# Patient Record
Sex: MALE | Employment: UNEMPLOYED | URBAN - METROPOLITAN AREA
[De-identification: names, ages, dates, MRNs, and addresses within clinical notes are randomized per-mention and may not be internally consistent; named-entity substitution may affect disease eponyms.]

---

## 2018-10-08 ENCOUNTER — HOSPITAL ENCOUNTER (INPATIENT)
Age: 48
LOS: 1 days | Discharge: HOME | DRG: 439 | End: 2018-10-10
Attending: EMERGENCY MEDICINE | Admitting: INTERNAL MEDICINE
Payer: OTHER GOVERNMENT

## 2018-10-08 ENCOUNTER — APPOINTMENT (OUTPATIENT)
Dept: CT IMAGING | Age: 48
DRG: 439 | End: 2018-10-08
Attending: EMERGENCY MEDICINE
Payer: OTHER GOVERNMENT

## 2018-10-08 DIAGNOSIS — K85.90 ACUTE PANCREATITIS, UNSPECIFIED COMPLICATION STATUS, UNSPECIFIED PANCREATITIS TYPE: Primary | ICD-10-CM

## 2018-10-08 DIAGNOSIS — R73.9 HYPERGLYCEMIA: ICD-10-CM

## 2018-10-08 DIAGNOSIS — N17.9 AKI (ACUTE KIDNEY INJURY) (HCC): ICD-10-CM

## 2018-10-08 LAB
ALBUMIN SERPL-MCNC: 2.6 G/DL (ref 3.4–5)
ALBUMIN/GLOB SERPL: 0.5 {RATIO} (ref 0.8–1.7)
ALP SERPL-CCNC: 134 U/L (ref 45–117)
ALT SERPL-CCNC: 24 U/L (ref 16–61)
ANION GAP SERPL CALC-SCNC: 9 MMOL/L (ref 3–18)
AST SERPL-CCNC: 23 U/L (ref 15–37)
BASOPHILS # BLD: 0.1 K/UL (ref 0–0.1)
BASOPHILS NFR BLD: 1 % (ref 0–2)
BILIRUB DIRECT SERPL-MCNC: 0.1 MG/DL (ref 0–0.2)
BILIRUB SERPL-MCNC: 0.3 MG/DL (ref 0.2–1)
BUN SERPL-MCNC: 34 MG/DL (ref 7–18)
BUN/CREAT SERPL: 17 (ref 12–20)
CALCIUM SERPL-MCNC: 8.5 MG/DL (ref 8.5–10.1)
CHLORIDE SERPL-SCNC: 97 MMOL/L (ref 100–108)
CO2 SERPL-SCNC: 26 MMOL/L (ref 21–32)
CREAT SERPL-MCNC: 1.97 MG/DL (ref 0.6–1.3)
DIFFERENTIAL METHOD BLD: NORMAL
EOSINOPHIL # BLD: 0.3 K/UL (ref 0–0.4)
EOSINOPHIL NFR BLD: 3 % (ref 0–5)
ERYTHROCYTE [DISTWIDTH] IN BLOOD BY AUTOMATED COUNT: 13.4 % (ref 11.6–14.5)
GLOBULIN SER CALC-MCNC: 4.9 G/DL (ref 2–4)
GLUCOSE SERPL-MCNC: 401 MG/DL (ref 74–99)
HCT VFR BLD AUTO: 45.7 % (ref 36–48)
HGB BLD-MCNC: 15.5 G/DL (ref 13–16)
LACTATE BLD-SCNC: 3 MMOL/L (ref 0.4–2)
LIPASE SERPL-CCNC: 735 U/L (ref 73–393)
LYMPHOCYTES # BLD: 3.3 K/UL (ref 0.9–3.6)
LYMPHOCYTES NFR BLD: 36 % (ref 21–52)
MCH RBC QN AUTO: 29.4 PG (ref 24–34)
MCHC RBC AUTO-ENTMCNC: 33.9 G/DL (ref 31–37)
MCV RBC AUTO: 86.6 FL (ref 74–97)
MONOCYTES # BLD: 0.9 K/UL (ref 0.05–1.2)
MONOCYTES NFR BLD: 9 % (ref 3–10)
NEUTS SEG # BLD: 4.8 K/UL (ref 1.8–8)
NEUTS SEG NFR BLD: 51 % (ref 40–73)
PLATELET # BLD AUTO: 232 K/UL (ref 135–420)
PMV BLD AUTO: 10.7 FL (ref 9.2–11.8)
POTASSIUM SERPL-SCNC: 3.8 MMOL/L (ref 3.5–5.5)
PROT SERPL-MCNC: 7.5 G/DL (ref 6.4–8.2)
RBC # BLD AUTO: 5.28 M/UL (ref 4.7–5.5)
SODIUM SERPL-SCNC: 132 MMOL/L (ref 136–145)
WBC # BLD AUTO: 9.3 K/UL (ref 4.6–13.2)

## 2018-10-08 PROCEDURE — 83605 ASSAY OF LACTIC ACID: CPT

## 2018-10-08 PROCEDURE — 96374 THER/PROPH/DIAG INJ IV PUSH: CPT

## 2018-10-08 PROCEDURE — 74011250636 HC RX REV CODE- 250/636

## 2018-10-08 PROCEDURE — 80076 HEPATIC FUNCTION PANEL: CPT | Performed by: EMERGENCY MEDICINE

## 2018-10-08 PROCEDURE — 96365 THER/PROPH/DIAG IV INF INIT: CPT

## 2018-10-08 PROCEDURE — 96376 TX/PRO/DX INJ SAME DRUG ADON: CPT

## 2018-10-08 PROCEDURE — 74011250637 HC RX REV CODE- 250/637: Performed by: EMERGENCY MEDICINE

## 2018-10-08 PROCEDURE — 74176 CT ABD & PELVIS W/O CONTRAST: CPT

## 2018-10-08 PROCEDURE — 87040 BLOOD CULTURE FOR BACTERIA: CPT | Performed by: EMERGENCY MEDICINE

## 2018-10-08 PROCEDURE — 85025 COMPLETE CBC W/AUTO DIFF WBC: CPT | Performed by: EMERGENCY MEDICINE

## 2018-10-08 PROCEDURE — 94761 N-INVAS EAR/PLS OXIMETRY MLT: CPT

## 2018-10-08 PROCEDURE — 96361 HYDRATE IV INFUSION ADD-ON: CPT

## 2018-10-08 PROCEDURE — 74011250636 HC RX REV CODE- 250/636: Performed by: EMERGENCY MEDICINE

## 2018-10-08 PROCEDURE — 81001 URINALYSIS AUTO W/SCOPE: CPT | Performed by: EMERGENCY MEDICINE

## 2018-10-08 PROCEDURE — 83690 ASSAY OF LIPASE: CPT | Performed by: EMERGENCY MEDICINE

## 2018-10-08 PROCEDURE — 74011636637 HC RX REV CODE- 636/637: Performed by: EMERGENCY MEDICINE

## 2018-10-08 PROCEDURE — 99285 EMERGENCY DEPT VISIT HI MDM: CPT

## 2018-10-08 PROCEDURE — 96375 TX/PRO/DX INJ NEW DRUG ADDON: CPT

## 2018-10-08 PROCEDURE — 80048 BASIC METABOLIC PNL TOTAL CA: CPT | Performed by: EMERGENCY MEDICINE

## 2018-10-08 RX ORDER — FENTANYL CITRATE 50 UG/ML
50 INJECTION, SOLUTION INTRAMUSCULAR; INTRAVENOUS
Status: COMPLETED | OUTPATIENT
Start: 2018-10-08 | End: 2018-10-08

## 2018-10-08 RX ORDER — POTASSIUM CHLORIDE 7.45 MG/ML
10 INJECTION INTRAVENOUS
Status: COMPLETED | OUTPATIENT
Start: 2018-10-08 | End: 2018-10-09

## 2018-10-08 RX ORDER — KETOROLAC TROMETHAMINE 30 MG/ML
30 INJECTION, SOLUTION INTRAMUSCULAR; INTRAVENOUS
Status: COMPLETED | OUTPATIENT
Start: 2018-10-08 | End: 2018-10-08

## 2018-10-08 RX ORDER — ONDANSETRON 2 MG/ML
4 INJECTION INTRAMUSCULAR; INTRAVENOUS
Status: COMPLETED | OUTPATIENT
Start: 2018-10-08 | End: 2018-10-08

## 2018-10-08 RX ORDER — POTASSIUM CHLORIDE 1.5 G/1.77G
40 POWDER, FOR SOLUTION ORAL
Status: DISCONTINUED | OUTPATIENT
Start: 2018-10-08 | End: 2018-10-08

## 2018-10-08 RX ORDER — POTASSIUM CHLORIDE 20 MEQ/1
40 TABLET, EXTENDED RELEASE ORAL
Status: COMPLETED | OUTPATIENT
Start: 2018-10-08 | End: 2018-10-08

## 2018-10-08 RX ORDER — ONDANSETRON 2 MG/ML
INJECTION INTRAMUSCULAR; INTRAVENOUS
Status: COMPLETED
Start: 2018-10-08 | End: 2018-10-08

## 2018-10-08 RX ADMIN — SODIUM CHLORIDE 1000 ML: 900 INJECTION, SOLUTION INTRAVENOUS at 21:02

## 2018-10-08 RX ADMIN — INSULIN HUMAN 10 UNITS: 100 INJECTION, SOLUTION PARENTERAL at 23:36

## 2018-10-08 RX ADMIN — FENTANYL CITRATE 50 MCG: 50 INJECTION INTRAMUSCULAR; INTRAVENOUS at 21:02

## 2018-10-08 RX ADMIN — KETOROLAC TROMETHAMINE 30 MG: 30 INJECTION, SOLUTION INTRAMUSCULAR at 21:02

## 2018-10-08 RX ADMIN — ONDANSETRON 4 MG: 2 INJECTION INTRAMUSCULAR; INTRAVENOUS at 23:34

## 2018-10-08 RX ADMIN — ONDANSETRON HYDROCHLORIDE 4 MG: 2 INJECTION, SOLUTION INTRAMUSCULAR; INTRAVENOUS at 23:34

## 2018-10-08 RX ADMIN — ONDANSETRON HYDROCHLORIDE 4 MG: 2 INJECTION, SOLUTION INTRAMUSCULAR; INTRAVENOUS at 21:00

## 2018-10-08 RX ADMIN — POTASSIUM CHLORIDE 40 MEQ: 20 TABLET, EXTENDED RELEASE ORAL at 23:57

## 2018-10-08 RX ADMIN — FENTANYL CITRATE 50 MCG: 50 INJECTION INTRAMUSCULAR; INTRAVENOUS at 23:54

## 2018-10-08 RX ADMIN — POTASSIUM CHLORIDE 10 MEQ: 10 INJECTION, SOLUTION INTRAVENOUS at 23:39

## 2018-10-08 RX ADMIN — SODIUM CHLORIDE 1000 ML: 900 INJECTION, SOLUTION INTRAVENOUS at 23:32

## 2018-10-08 RX ADMIN — SODIUM CHLORIDE 1000 ML: 900 INJECTION, SOLUTION INTRAVENOUS at 22:06

## 2018-10-08 NOTE — IP AVS SNAPSHOT
[address] Patient: Reena Bundy MRN: [id number] PUO:3/1[birth year]494 About your hospitalization You were admitted on:  October 9, 2018 You last received care in the:  [address] [place] [address] You were discharged on:  October 10, 2018 Why you were hospitalized Your primary diagnosis was:  Acute Pancreatitis Your diagnoses also included:  Type 2 Diabetes Mellitus With Hyperglycemia (Hcc), Altaf (Acute Kidney Injury) (Hcc), Elevated Serum Alkaline Phosphatase Level, Hyponatremia Follow-up Information Follow up With Details Comments Contact Info UNKNOWN   told pt to follow up with [facility] specialty group. No follow up appointment needed Discharge Orders None A check denilson indicates which time of day the medication should be taken. My Medications START taking these medications Instructions Each Dose to Equal  
 Morning Noon Evening Bedtime  
 traMADol 50 mg tablet Commonly known as:  ULTRAM  
   
Your last dose was: Your next dose is: Take 1 Tab by mouth every six (6) hours as needed for Pain for up to 7 days. Max Daily Amount: 200 mg.  
 50 mg CONTINUE taking these medications Instructions Each Dose to Equal  
 Morning Noon Evening Bedtime BYDUREON BCISE 2 mg/0.85 mL Atin Generic drug:  exenatide microspheres Your last dose was: Your next dose is:    
   
   
 by SubCUTAneous route every seven (7) days. LANTUS U-100 INSULIN 100 unit/mL injection Generic drug:  insulin glargine Your last dose was: Your next dose is:    
   
   
 80 Units by SubCUTAneous route nightly. 80 Units NovoLOG Flexpen U-100 Insulin 100 unit/mL Inpn Generic drug:  insulin aspart U-100 Your last dose was: Your next dose is: by SubCUTAneous route Before breakfast, lunch, dinner and at bedtime. STOP taking these medications   
 ergocalciferol 50,000 unit capsule Commonly known as:  ERGOCALCIFEROL  
   
  
 LIPITOR 20 mg tablet Generic drug:  atorvastatin  
   
  
 lisinopril 10 mg tablet Commonly known as:  Gabby Chávez Where to Get Your Medications Information on where to get these meds will be given to you by the nurse or doctor. ! Ask your nurse or doctor about these medications  
  traMADol 50 mg tablet Opioid Education Prescription Opioids: What You Need to Know: 
 
Prescription opioids can be used to help relieve moderate-to-severe pain and are often prescribed following a surgery or injury, or for certain health conditions. These medications can be an important part of treatment but also come with serious risks. Opioids are strong pain medicines. Examples include hydrocodone, oxycodone, fentanyl, and morphine. Heroin is an example of an illegal opioid. It is important to work with your health care provider to make sure you are getting the safest, most effective care. WHAT ARE THE RISKS AND SIDE EFFECTS OF OPIOID USE? Prescription opioids carry serious risks of addiction and overdose, especially with prolonged use. An opioid overdose, often marked by slow breathing, can cause sudden death. The use of prescription opioids can have a number of side effects as well, even when taken as directed. · Tolerance-meaning you might need to take more of a medication for the same pain relief · Physical dependence-meaning you have symptoms of withdrawal when the medication is stopped. Withdrawal symptoms can include nausea, sweating, chills, diarrhea, stomach cramps, and muscle aches. Withdrawal can last up to several weeks, depending on which drug you took and how long you took it. · Increased sensitivity to pain · Constipation · Nausea, vomiting, and dry mouth · Sleepiness and dizziness · Confusion · Depression · Low levels of testosterone that can result in lower sex drive, energy, and strength · Itching and sweating RISKS ARE GREATER WITH:      
· History of drug misuse, substance use disorder, or overdose · Mental health conditions (such as depression or anxiety) · Sleep apnea · Older age (72 years or older) · Pregnancy Avoid alcohol while taking prescription opioids. Also, unless specifically advised by your health care provider, medications to avoid include: · Benzodiazepines (such as Xanax or Valium) · Muscle relaxants (such as Soma or Flexeril) · Hypnotics (such as Ambien or Lunesta) · Other prescription opioids KNOW YOUR OPTIONS Talk to your health care provider about ways to manage your pain that don't involve prescription opioids. Some of these options may actually work better and have fewer risks and side effects. Consult your physician before adding or stopping any medications, treatments, or physical activity. Options may include: 
· Pain relievers such as acetaminophen, ibuprofen, and naproxen · Some medications that are also used for depression or seizures · Physical therapy and exercise · Counseling to help patients learn how to cope better with triggers of pain and stress. · Application of heat or cold compress · Massage therapy · Relaxation techniques Be Informed Make sure you know the name of your medication, how much and how often to take it, and its potential risks & side effects. IF YOU ARE PRESCRIBED OPIOIDS FOR PAIN: 
· Never take opioids in greater amounts or more often than prescribed. Remember the goal is not to be pain-free but to manage your pain at a tolerable level. · Follow up with your primary care provider to: · Work together to create a plan on how to manage your pain. · Talk about ways to help manage your pain that don't involve prescription opioids. · Talk about any and all concerns and side effects. · Help prevent misuse and abuse. · Never sell or share prescription opioids · Help prevent misuse and abuse. · Store prescription opioids in a secure place and out of reach of others (this may include visitors, children, friends, and family). · Safely dispose of unused/unwanted prescription opioids: Find your community drug take-back program or your pharmacy mail-back program, or flush them down the toilet, following guidance from the Food and Drug Administration (www.fda.gov/Drugs/ResourcesForYou). · Visit www.cdc.gov/drugoverdose to learn about the risks of opioid abuse and overdose. · If you believe you may be struggling with addiction, tell your health care provider and ask for guidance or call [employer] at 6-778-175-CQKF. Discharge Instructions Pancreatitis: Care Instructions Your Care Instructions The pancreas is an organ behind the stomach. It makes hormones and enzymes to help your body digest food. But if these enzymes attack the pancreas, it can get inflamed. This is called pancreatitis. Most cases are caused by gallstones or by heavy alcohol use. If you take care of yourself at home, it will help you get better. It will also help you avoid more problems with your pancreas. Follow-up care is a key part of your treatment and safety. Be sure to make and go to all appointments, and call your doctor if you are having problems. It's also a good idea to know your test results and keep a list of the medicines you take. How can you care for yourself at home? · Drink clear liquids and eat bland foods until you feel better. [place] foods include rice, dry toast, and crackers. They also include bananas and applesauce. · Eat a low-fat diet until your doctor says your pancreas is healed. · Do not drink alcohol. Tell your doctor if you need help to quit. Counseling, support groups, and sometimes medicines can help you stay sober. · Be safe with medicines. Read and follow all instructions on the label. ¨ If the doctor gave you a prescription medicine for pain, take it as prescribed. ¨ If you are not taking a prescription pain medicine, ask your doctor if you can take an over-the-counter medicine. · If your doctor prescribed antibiotics, take them as directed. Do not stop taking them just because you feel better. You need to take the full course of antibiotics. · Get extra rest until you feel better. To prevent future problems with your pancreas · Do not drink alcohol. · Tell your doctors and pharmacist that you've had pancreatitis. They can help you avoid medicines that may cause this problem again. When should you call for help? Call 911 anytime you think you may need emergency care. For example, call if: 
  · You vomit blood or what looks like coffee grounds.  
  · Your stools are maroon or very bloody.  
 Call your doctor now or seek immediate medical care if: 
  · You have new or worse belly pain.  
  · Your stools are black and look like tar, or they have streaks of blood.  
  · You are vomiting.  
 Watch closely for changes in your health, and be sure to contact your doctor if: 
  · You do not get better as expected. Where can you learn more? Go to http://reese-tonie.info/. Enter W011 in the search box to learn more about \"Pancreatitis: Care Instructions. \" Current as of: March 28, 2018 Content Version: 11.8 © 0766-0112 Healthwise, Incorporated. Care instructions adapted under license by [employer] (which disclaims liability or warranty for this information).  If you have questions about a medical condition or this instruction, always ask your healthcare professional. [place] [address] any warranty or liability for your use of this information. Patient armband removed and shredded MyChart Activation Thank you for requesting access to Nvigen. Please follow the instructions below to securely access and download your online medical record. Nvigen allows you to send messages to your doctor, view your test results, renew your prescriptions, schedule appointments, and more. How Do I Sign Up? 1. In your internet browser, go to [email or web address] 
2. Click on the First Time User? Click Here link in the Sign In box. You will be redirect to the New Member Sign Up page. 3. Enter your Nvigen Access Code exactly as it appears below. You will not need to use this code after youve completed the sign-up process. If you do not sign up before the expiration date, you must request a new code. Nvigen Access Code: [email or web address] Expires: 2019  8:29 PM (This is the date your Nvigen access code will ) 4. Enter the last four digits of your Social Security Number (xxxx) and Date of Birth (mm/dd/yyyy) as indicated and click Submit. You will be taken to the next sign-up page. 5. Create a Nvigen ID. This will be your Nvigen login ID and cannot be changed, so think of one that is secure and easy to remember. 6. Create a Nvigen password. You can change your password at any time. 7. Enter your Password Reset Question and Answer. This can be used at a later time if you forget your password. 8. Enter your e-mail address. You will receive e-mail notification when new information is available in [address] 9. Click Sign Up. You can now view and download portions of your medical record. 10. Click the Download Summary menu link to download a portable copy of your medical information. Additional Information If you have questions, please visit the Frequently Asked Questions section of the Nvigen website at [email or web address] Remember, Nvigen is NOT to be used for urgent needs.  For medical emergencies, dial 911. DISCHARGE SUMMARY from Nurse PATIENT INSTRUCTIONS: 
 
 
F-face looks uneven A-arms unable to move or move unevenly S-speech slurred or non-existent T-time-call 911 as soon as signs and symptoms begin-DO NOT go Back to bed or wait to see if you get better-TIME IS BRAIN. Warning Signs of HEART ATTACK Call 911 if you have these symptoms: 
? Chest discomfort. Most heart attacks involve discomfort in the center of the chest that lasts more than a few minutes, or that goes away and comes back. It can feel like uncomfortable pressure, squeezing, fullness, or pain. ? Discomfort in other areas of the upper body. Symptoms can include pain or discomfort in one or both arms, the back, neck, jaw, or stomach. ? Shortness of breath with or without chest discomfort. ? Other signs may include breaking out in a cold sweat, nausea, or lightheadedness. Don't wait more than five minutes to call [address]! Fast action can save your life. Calling 911 is almost always the fastest way to get lifesaving treatment. Emergency Medical Services staff can begin treatment when they arrive  up to an hour sooner than if someone gets to the hospital by car. The discharge information has been reviewed with the patient. The patient verbalized understanding. Discharge medications reviewed with the patient and appropriate educational materials and side effects teaching were provided. ___________________________________________________________________________________________________________________________________ MyChart Announcement We are excited to announce that we are making your provider's discharge notes available to you in Keemotion. You will see these notes when they are completed and signed by the physician that discharged you from your recent hospital stay. If you have any questions or concerns about any information you see in Keemotion, please call the Health Information Department where you were seen or reach out to your Primary Care Provider for more information about your plan of care. Introducing [facility]! [facility] introduces Keemotion patient portal. Now you can access parts of your medical record, email your doctor's office, and request medication refills online. 1. In your internet browser, go to [email or web address] 2. Click on the First Time User? Click Here link in the Sign In box. You will see the New Member Sign Up page. 3. Enter your Keemotion Access Code exactly as it appears below. You will not need to use this code after youve completed the sign-up process. If you do not sign up before the expiration date, you must request a new code. · Keemotion Access Code: [email or web address] Expires: 1/6/2019  8:29 PM 
 
4. Enter the last four digits of your Social Security Number (xxxx) and Date of Birth (mm/dd/yyyy) as indicated and click Submit. You will be taken to the next sign-up page. 5. Create a Keemotion ID. This will be your Keemotion login ID and cannot be changed, so think of one that is secure and easy to remember. 6. Create a Keemotion password. You can change your password at any time. 7. Enter your Password Reset Question and Answer. This can be used at a later time if you forget your password. 8. Enter your e-mail address. You will receive e-mail notification when new information is available in [address] 9. Click Sign Up. You can now view and download portions of your medical record.  
10. Click the Download Summary menu link to download a portable copy of your medical information. If you have questions, please visit the Frequently Asked Questions section of the SOL REPUBLIChart website. Remember, [employer] is NOT to be used for urgent needs. For medical emergencies, dial 911. Now available from your iPhone and Android! Introducing Gen Cordova As a [facility] patient, I wanted to make you aware of our electronic visit tool called Gen Cordova. Fournier Wandera 24/7 allows you to connect within minutes with a medical provider 24 hours a day, seven days a week via a mobile device or tablet or logging into a secure website from your computer. You can access Gen Cordova from anywhere in the [place]. A virtual visit might be right for you when you have a simple condition and feel like you just dont want to get out of bed, or cant get away from work for an appointment, when your regular [facility] provider is not available (evenings, weekends or holidays), or when youre out of town and need minor care. Electronic visits cost only $49 and if the [facility] 24/7 provider determines a prescription is needed to treat your condition, one can be electronically transmitted to a nearby pharmacy*. Please take a moment to enroll today if you have not already done so. The enrollment process is free and takes just a few minutes. To enroll, please download the Fournier Cord 24/Taste Filter deven to your tablet or phone, or visit [email or web address] to enroll on your computer. And, as an [address] [place] [address] patient with a Agilyx account, the results of your visits will be scanned into your electronic medical record and your primary care provider will be able to view the scanned results. We urge you to continue to see your regular [facility] provider for your ongoing medical care.   And while your primary care provider may not be the one available when you seek a Gen Cordova virtual visit, the peace of mind you get from getting a real diagnosis real time can be priceless. For more information on Gen scoo mobilitykbfin, view our Frequently Asked Questions (FAQs) at [email or web address]. Sincerely, 
 
Timbo Thornton MD 
Chief Medical Officer Ramona Murphy *:  certain medications cannot be prescribed via Gen Josegodfrey Unresulted Labs-Please follow up with your PCP about these lab tests Order Current Status KENNY QL, W/REFLEX CASCADE Preliminary result CULTURE, BLOOD Preliminary result CULTURE, BLOOD Preliminary result Providers Seen During Your Hospitalization Provider Specialty Primary office phone Onur Lee MD Emergency Medicine [phone] Olga Plaza MD Hospitalist [phone] Cindy Orosco MD Internal Medicine [phone] Immunizations Administered for This Admission Name Date Influenza Vaccine (Quad) PF  Deferred () Your Primary Care Physician (PCP) Primary Care Physician Office Phone Office Fax UNKNOWN ** None ** ** None ** You are allergic to the following Allergen Reactions Amoxicillin Nausea and Vomiting Gabapentin Nausea and Vomiting Other (comments) Urinary retention Metformin Nausea and Vomiting Recent Documentation Weight Smoking Status 125.9 kg Never Smoker Emergency Contacts Name Discharge Info Relation Home Work Mobile Kevin Spine DISCHARGE CAREGIVER [3] Spouse [3] [phone] Patient Belongings The following personal items are in your possession at time of discharge: 
  Dental Appliances: None  Visual Aid: None      Home Medications: None   Jewelry: None  Clothing: None    Other Valuables: None Please provide this summary of care documentation to your next provider.  
  
  
 
  
Signatures-by signing, you are acknowledging that this After Visit Summary has been reviewed with you and you have received a copy. Patient Signature:  ____________________________________________________________ Date:  ____________________________________________________________  
  
Carlean Mart Provider Signature:  ____________________________________________________________ Date:  ____________________________________________________________

## 2018-10-08 NOTE — IP AVS SNAPSHOT
[address] Patient: Be Desouza MRN: [id number] OJY:[birth year] A check denilson indicates which time of day the medication should be taken. My Medications START taking these medications Instructions Each Dose to Equal  
 Morning Noon Evening Bedtime  
 traMADol 50 mg tablet Commonly known as:  ULTRAM  
   
Your last dose was: Your next dose is: Take 1 Tab by mouth every six (6) hours as needed for Pain for up to 7 days. Max Daily Amount: 200 mg.  
 50 mg CONTINUE taking these medications Instructions Each Dose to Equal  
 Morning Noon Evening Bedtime BYDUREON BCISE 2 mg/0.85 mL Atin Generic drug:  exenatide microspheres Your last dose was: Your next dose is:    
   
   
 by SubCUTAneous route every seven (7) days. LANTUS U-100 INSULIN 100 unit/mL injection Generic drug:  insulin glargine Your last dose was: Your next dose is:    
   
   
 80 Units by SubCUTAneous route nightly. 80 Units NovoLOG Flexpen U-100 Insulin 100 unit/mL Inpn Generic drug:  insulin aspart U-100 Your last dose was: Your next dose is:    
   
   
 by SubCUTAneous route Before breakfast, lunch, dinner and at bedtime. STOP taking these medications   
 ergocalciferol 50,000 unit capsule Commonly known as:  ERGOCALCIFEROL  
   
  
 LIPITOR 20 mg tablet Generic drug:  atorvastatin  
   
  
 lisinopril 10 mg tablet Commonly known as:  Shania Burns Where to Get Your Medications Information on where to get these meds will be given to you by the nurse or doctor. ! Ask your nurse or doctor about these medications  
  traMADol 50 mg tablet

## 2018-10-08 NOTE — LETTER
[address] [place] [address] [facility] 
[address] [facility] [address] [phone] Work/School Note Date: 10/8/2018 To Whom It May concern: 
 
Darwin Ruano was seen and treated today in the emergency room by the following provider(s): 
Attending Provider: Nora Kaiser MD. Please excuse Bev Honeycutt from work 10/09/2018. Sincerely, Jessica Turcios RN BSN SINDI NRP

## 2018-10-09 ENCOUNTER — APPOINTMENT (OUTPATIENT)
Dept: MRI IMAGING | Age: 48
DRG: 439 | End: 2018-10-09
Attending: INTERNAL MEDICINE
Payer: OTHER GOVERNMENT

## 2018-10-09 ENCOUNTER — APPOINTMENT (OUTPATIENT)
Dept: ULTRASOUND IMAGING | Age: 48
DRG: 439 | End: 2018-10-09
Attending: NURSE PRACTITIONER
Payer: OTHER GOVERNMENT

## 2018-10-09 PROBLEM — K85.90 ACUTE PANCREATITIS: Status: ACTIVE | Noted: 2018-10-09

## 2018-10-09 PROBLEM — E87.1 HYPONATREMIA: Status: ACTIVE | Noted: 2018-10-09

## 2018-10-09 PROBLEM — N17.9 AKI (ACUTE KIDNEY INJURY) (HCC): Status: ACTIVE | Noted: 2018-10-09

## 2018-10-09 PROBLEM — R74.8 ELEVATED SERUM ALKALINE PHOSPHATASE LEVEL: Status: ACTIVE | Noted: 2018-10-09

## 2018-10-09 PROBLEM — E11.65 TYPE 2 DIABETES MELLITUS WITH HYPERGLYCEMIA (HCC): Status: ACTIVE | Noted: 2018-10-09

## 2018-10-09 PROBLEM — R73.9 HYPERGLYCEMIA: Status: ACTIVE | Noted: 2018-10-09

## 2018-10-09 PROBLEM — R79.89 ELEVATED LFTS: Status: ACTIVE | Noted: 2018-10-09

## 2018-10-09 LAB
AMORPH CRY URNS QL MICRO: ABNORMAL
ANION GAP SERPL CALC-SCNC: 7 MMOL/L (ref 3–18)
APPEARANCE UR: CLEAR
BACTERIA URNS QL MICRO: ABNORMAL /HPF
BASOPHILS # BLD: 0 K/UL (ref 0–0.1)
BASOPHILS NFR BLD: 0 % (ref 0–2)
BILIRUB UR QL: NEGATIVE
BUN SERPL-MCNC: 31 MG/DL (ref 7–18)
BUN/CREAT SERPL: 20 (ref 12–20)
CALCIUM SERPL-MCNC: 6.9 MG/DL (ref 8.5–10.1)
CHLORIDE SERPL-SCNC: 107 MMOL/L (ref 100–108)
CHOLEST SERPL-MCNC: 218 MG/DL
CO2 SERPL-SCNC: 23 MMOL/L (ref 21–32)
COLOR UR: YELLOW
CREAT SERPL-MCNC: 1.52 MG/DL (ref 0.6–1.3)
DIFFERENTIAL METHOD BLD: ABNORMAL
EOSINOPHIL # BLD: 0.2 K/UL (ref 0–0.4)
EOSINOPHIL NFR BLD: 2 % (ref 0–5)
EPITH CASTS URNS QL MICRO: ABNORMAL /LPF (ref 0–5)
ERYTHROCYTE [DISTWIDTH] IN BLOOD BY AUTOMATED COUNT: 13.5 % (ref 11.6–14.5)
EST. AVERAGE GLUCOSE BLD GHB EST-MCNC: 263 MG/DL
GLUCOSE BLD STRIP.AUTO-MCNC: 147 MG/DL (ref 70–110)
GLUCOSE BLD STRIP.AUTO-MCNC: 170 MG/DL (ref 70–110)
GLUCOSE BLD STRIP.AUTO-MCNC: 207 MG/DL (ref 70–110)
GLUCOSE BLD STRIP.AUTO-MCNC: 223 MG/DL (ref 70–110)
GLUCOSE BLD STRIP.AUTO-MCNC: 252 MG/DL (ref 70–110)
GLUCOSE BLD STRIP.AUTO-MCNC: 299 MG/DL (ref 70–110)
GLUCOSE SERPL-MCNC: 284 MG/DL (ref 74–99)
GLUCOSE UR STRIP.AUTO-MCNC: >1000 MG/DL
GRAN CASTS URNS QL MICRO: ABNORMAL /LPF
HBA1C MFR BLD: 10.8 % (ref 4.2–5.6)
HCT VFR BLD AUTO: 34.6 % (ref 36–48)
HDLC SERPL-MCNC: 27 MG/DL (ref 40–60)
HDLC SERPL: 8.1 {RATIO} (ref 0–5)
HGB BLD-MCNC: 12 G/DL (ref 13–16)
HGB UR QL STRIP: ABNORMAL
HYALINE CASTS URNS QL MICRO: ABNORMAL /LPF (ref 0–2)
KETONES UR QL STRIP.AUTO: NEGATIVE MG/DL
LACTATE BLD-SCNC: 0.6 MMOL/L (ref 0.4–2)
LDLC SERPL CALC-MCNC: ABNORMAL MG/DL (ref 0–100)
LEUKOCYTE ESTERASE UR QL STRIP.AUTO: NEGATIVE
LIPID PROFILE,FLP: ABNORMAL
LYMPHOCYTES # BLD: 2.5 K/UL (ref 0.9–3.6)
LYMPHOCYTES NFR BLD: 24 % (ref 21–52)
MCH RBC QN AUTO: 29.6 PG (ref 24–34)
MCHC RBC AUTO-ENTMCNC: 34.7 G/DL (ref 31–37)
MCV RBC AUTO: 85.2 FL (ref 74–97)
MONOCYTES # BLD: 0.8 K/UL (ref 0.05–1.2)
MONOCYTES NFR BLD: 7 % (ref 3–10)
MUCOUS THREADS URNS QL MICRO: ABNORMAL /LPF
NEUTS SEG # BLD: 7.2 K/UL (ref 1.8–8)
NEUTS SEG NFR BLD: 67 % (ref 40–73)
NITRITE UR QL STRIP.AUTO: NEGATIVE
PH UR STRIP: 5 [PH] (ref 5–8)
PLATELET # BLD AUTO: 179 K/UL (ref 135–420)
PMV BLD AUTO: 10.3 FL (ref 9.2–11.8)
POTASSIUM SERPL-SCNC: 4.5 MMOL/L (ref 3.5–5.5)
PROT UR STRIP-MCNC: 300 MG/DL
RBC # BLD AUTO: 4.06 M/UL (ref 4.7–5.5)
RBC #/AREA URNS HPF: ABNORMAL /HPF (ref 0–5)
SODIUM SERPL-SCNC: 137 MMOL/L (ref 136–145)
SP GR UR REFRACTOMETRY: 1.02 (ref 1–1.03)
TRIGL SERPL-MCNC: 843 MG/DL (ref ?–150)
UROBILINOGEN UR QL STRIP.AUTO: 0.2 EU/DL (ref 0.2–1)
VLDLC SERPL CALC-MCNC: ABNORMAL MG/DL
WBC # BLD AUTO: 10.7 K/UL (ref 4.6–13.2)
WBC URNS QL MICRO: ABNORMAL /HPF (ref 0–4)

## 2018-10-09 PROCEDURE — 80061 LIPID PANEL: CPT | Performed by: INTERNAL MEDICINE

## 2018-10-09 PROCEDURE — 74011250636 HC RX REV CODE- 250/636: Performed by: INTERNAL MEDICINE

## 2018-10-09 PROCEDURE — 96376 TX/PRO/DX INJ SAME DRUG ADON: CPT

## 2018-10-09 PROCEDURE — 74011636637 HC RX REV CODE- 636/637: Performed by: INTERNAL MEDICINE

## 2018-10-09 PROCEDURE — 74181 MRI ABDOMEN W/O CONTRAST: CPT

## 2018-10-09 PROCEDURE — 36415 COLL VENOUS BLD VENIPUNCTURE: CPT | Performed by: INTERNAL MEDICINE

## 2018-10-09 PROCEDURE — 80048 BASIC METABOLIC PNL TOTAL CA: CPT | Performed by: INTERNAL MEDICINE

## 2018-10-09 PROCEDURE — 82787 IGG 1 2 3 OR 4 EACH: CPT | Performed by: PHYSICIAN ASSISTANT

## 2018-10-09 PROCEDURE — 85025 COMPLETE CBC W/AUTO DIFF WBC: CPT | Performed by: INTERNAL MEDICINE

## 2018-10-09 PROCEDURE — 83036 HEMOGLOBIN GLYCOSYLATED A1C: CPT | Performed by: INTERNAL MEDICINE

## 2018-10-09 PROCEDURE — 65660000000 HC RM CCU STEPDOWN

## 2018-10-09 PROCEDURE — 83605 ASSAY OF LACTIC ACID: CPT

## 2018-10-09 PROCEDURE — 82962 GLUCOSE BLOOD TEST: CPT

## 2018-10-09 PROCEDURE — 86038 ANTINUCLEAR ANTIBODIES: CPT | Performed by: PHYSICIAN ASSISTANT

## 2018-10-09 PROCEDURE — 74011250636 HC RX REV CODE- 250/636: Performed by: EMERGENCY MEDICINE

## 2018-10-09 PROCEDURE — 76705 ECHO EXAM OF ABDOMEN: CPT

## 2018-10-09 RX ORDER — NALOXONE HYDROCHLORIDE 0.4 MG/ML
0.4 INJECTION, SOLUTION INTRAMUSCULAR; INTRAVENOUS; SUBCUTANEOUS AS NEEDED
Status: DISCONTINUED | OUTPATIENT
Start: 2018-10-09 | End: 2018-10-10 | Stop reason: HOSPADM

## 2018-10-09 RX ORDER — FENTANYL CITRATE 50 UG/ML
50 INJECTION, SOLUTION INTRAMUSCULAR; INTRAVENOUS
Status: COMPLETED | OUTPATIENT
Start: 2018-10-09 | End: 2018-10-09

## 2018-10-09 RX ORDER — SODIUM CHLORIDE 9 MG/ML
125 INJECTION, SOLUTION INTRAVENOUS CONTINUOUS
Status: DISCONTINUED | OUTPATIENT
Start: 2018-10-09 | End: 2018-10-09

## 2018-10-09 RX ORDER — INSULIN LISPRO 100 [IU]/ML
INJECTION, SOLUTION INTRAVENOUS; SUBCUTANEOUS EVERY 6 HOURS
Status: DISCONTINUED | OUTPATIENT
Start: 2018-10-09 | End: 2018-10-10

## 2018-10-09 RX ORDER — SODIUM CHLORIDE, SODIUM LACTATE, POTASSIUM CHLORIDE, CALCIUM CHLORIDE 600; 310; 30; 20 MG/100ML; MG/100ML; MG/100ML; MG/100ML
150 INJECTION, SOLUTION INTRAVENOUS CONTINUOUS
Status: DISCONTINUED | OUTPATIENT
Start: 2018-10-09 | End: 2018-10-10 | Stop reason: HOSPADM

## 2018-10-09 RX ORDER — ONDANSETRON 2 MG/ML
4 INJECTION INTRAMUSCULAR; INTRAVENOUS
Status: DISCONTINUED | OUTPATIENT
Start: 2018-10-09 | End: 2018-10-10 | Stop reason: HOSPADM

## 2018-10-09 RX ORDER — EXENATIDE 2 MG/.85ML
INJECTION, SUSPENSION, EXTENDED RELEASE SUBCUTANEOUS
COMMUNITY
End: 2019-05-27

## 2018-10-09 RX ORDER — HEPARIN SODIUM 5000 [USP'U]/ML
5000 INJECTION, SOLUTION INTRAVENOUS; SUBCUTANEOUS EVERY 8 HOURS
Status: DISCONTINUED | OUTPATIENT
Start: 2018-10-09 | End: 2018-10-10 | Stop reason: HOSPADM

## 2018-10-09 RX ORDER — ACETAMINOPHEN 325 MG/1
650 TABLET ORAL
Status: DISCONTINUED | OUTPATIENT
Start: 2018-10-09 | End: 2018-10-10 | Stop reason: HOSPADM

## 2018-10-09 RX ORDER — OXYCODONE AND ACETAMINOPHEN 5; 325 MG/1; MG/1
1 TABLET ORAL
Status: DISCONTINUED | OUTPATIENT
Start: 2018-10-09 | End: 2018-10-10 | Stop reason: HOSPADM

## 2018-10-09 RX ORDER — INSULIN ASPART 100 [IU]/ML
INJECTION, SOLUTION INTRAVENOUS; SUBCUTANEOUS
COMMUNITY
End: 2019-05-27

## 2018-10-09 RX ORDER — INSULIN GLARGINE 100 [IU]/ML
10 INJECTION, SOLUTION SUBCUTANEOUS 2 TIMES DAILY
Status: DISCONTINUED | OUTPATIENT
Start: 2018-10-09 | End: 2018-10-10 | Stop reason: HOSPADM

## 2018-10-09 RX ORDER — MORPHINE SULFATE 2 MG/ML
2 INJECTION, SOLUTION INTRAMUSCULAR; INTRAVENOUS
Status: DISCONTINUED | OUTPATIENT
Start: 2018-10-09 | End: 2018-10-10 | Stop reason: HOSPADM

## 2018-10-09 RX ORDER — DOCUSATE SODIUM 100 MG/1
100 CAPSULE, LIQUID FILLED ORAL
Status: DISCONTINUED | OUTPATIENT
Start: 2018-10-09 | End: 2018-10-10 | Stop reason: HOSPADM

## 2018-10-09 RX ORDER — INSULIN GLARGINE 100 [IU]/ML
80 INJECTION, SOLUTION SUBCUTANEOUS
COMMUNITY

## 2018-10-09 RX ORDER — ERGOCALCIFEROL 1.25 MG/1
50000 CAPSULE ORAL DAILY
COMMUNITY
End: 2018-10-10

## 2018-10-09 RX ORDER — INSULIN LISPRO 100 [IU]/ML
INJECTION, SOLUTION INTRAVENOUS; SUBCUTANEOUS
Status: DISCONTINUED | OUTPATIENT
Start: 2018-10-09 | End: 2018-10-09

## 2018-10-09 RX ORDER — ATORVASTATIN CALCIUM 20 MG/1
TABLET, FILM COATED ORAL DAILY
COMMUNITY
End: 2018-10-10

## 2018-10-09 RX ORDER — LISINOPRIL 10 MG/1
10 TABLET ORAL DAILY
COMMUNITY
End: 2018-10-10

## 2018-10-09 RX ADMIN — MORPHINE SULFATE 2 MG: 2 INJECTION, SOLUTION INTRAMUSCULAR; INTRAVENOUS at 22:00

## 2018-10-09 RX ADMIN — HEPARIN SODIUM 5000 UNITS: 5000 INJECTION, SOLUTION INTRAVENOUS; SUBCUTANEOUS at 14:32

## 2018-10-09 RX ADMIN — INSULIN LISPRO 4 UNITS: 100 INJECTION, SOLUTION INTRAVENOUS; SUBCUTANEOUS at 12:32

## 2018-10-09 RX ADMIN — MORPHINE SULFATE 2 MG: 2 INJECTION, SOLUTION INTRAMUSCULAR; INTRAVENOUS at 06:20

## 2018-10-09 RX ADMIN — SODIUM CHLORIDE, SODIUM LACTATE, POTASSIUM CHLORIDE, AND CALCIUM CHLORIDE 150 ML/HR: 600; 310; 30; 20 INJECTION, SOLUTION INTRAVENOUS at 14:32

## 2018-10-09 RX ADMIN — ONDANSETRON HYDROCHLORIDE 4 MG: 2 SOLUTION INTRAMUSCULAR; INTRAVENOUS at 11:10

## 2018-10-09 RX ADMIN — HEPARIN SODIUM 5000 UNITS: 5000 INJECTION, SOLUTION INTRAVENOUS; SUBCUTANEOUS at 22:00

## 2018-10-09 RX ADMIN — SODIUM CHLORIDE 125 ML/HR: 900 INJECTION, SOLUTION INTRAVENOUS at 09:22

## 2018-10-09 RX ADMIN — MORPHINE SULFATE 2 MG: 2 INJECTION, SOLUTION INTRAMUSCULAR; INTRAVENOUS at 02:46

## 2018-10-09 RX ADMIN — MORPHINE SULFATE 2 MG: 2 INJECTION, SOLUTION INTRAMUSCULAR; INTRAVENOUS at 11:10

## 2018-10-09 RX ADMIN — INSULIN GLARGINE 10 UNITS: 100 INJECTION, SOLUTION SUBCUTANEOUS at 09:00

## 2018-10-09 RX ADMIN — SODIUM CHLORIDE 125 ML/HR: 900 INJECTION, SOLUTION INTRAVENOUS at 02:51

## 2018-10-09 RX ADMIN — FENTANYL CITRATE 50 MCG: 50 INJECTION INTRAMUSCULAR; INTRAVENOUS at 00:42

## 2018-10-09 NOTE — ED NOTES
TRANSFER - OUT REPORT: 
 
Verbal report given to Frandy Ramos RN(name) on Javi Irwin  being transferred to [facility], [address] [place] [address](unit) for routine progression of care Report consisted of patients Situation, Background, Assessment and  
Recommendations(SBAR). Information from the following report(s) SBAR, Kardex, MAR, Recent Results and Cardiac Rhythm Sinus Rhythm. was reviewed with the receiving nurse. Lines:  
Peripheral IV 10/08/18 Right Antecubital (Active) Site Assessment Clean, dry, & intact 10/8/2018  9:00 PM  
Phlebitis Assessment 0 10/8/2018  9:00 PM  
Infiltration Assessment 0 10/8/2018  9:00 PM  
Dressing Status Clean, dry, & intact 10/8/2018  9:00 PM  
Dressing Type Tape;Transparent 10/8/2018  9:00 PM  
Hub Color/Line Status Flushed;Patent 10/8/2018  9:00 PM  
Action Taken Blood drawn 10/8/2018  9:00 PM  
   
Peripheral IV 10/08/18 Right Wrist (Active) Site Assessment Clean, dry, & intact 10/8/2018  9:10 PM  
Phlebitis Assessment 0 10/8/2018  9:10 PM  
Infiltration Assessment 0 10/8/2018  9:10 PM  
Dressing Status Clean, dry, & intact 10/8/2018  9:10 PM  
Dressing Type Tape;Transparent 10/8/2018  9:10 PM  
Hub Color/Line Status Flushed;Patent 10/8/2018  9:10 PM  
Action Taken Blood drawn 10/8/2018  9:10 PM  
  
 
Opportunity for questions and clarification was provided. Patient transported with: 
 Monitor

## 2018-10-09 NOTE — H&P
History & Physical 
 
Patient: Chadwick Freeman MRN: [id number]  CSN: [id number] YOB: 1970  Age: 50 y.o. Sex: male DOA: 10/8/2018 Chief Complaint:  
Chief Complaint Patient presents with  Vomiting  Groin Pain  Skin Problem HPI:  
 
Chadwick Freeman is a 50 y.o.  male who has PMH of Uncontrolled DM and morbidly obese s/p cholecystectomy June 9481, non-alcoholic and non smoker on lisinopril for DM Pt presented to [facility] ER with progressively worsening back pain that started around 7 Pm last night and extended to his abdomen 9/10 in severity stabbing and squeezing like. Pain was associated with multiple episodes of N/V in ER Pt denies diarrhea and constipation. States that he started to experience abdominal pain for few weeks but was tolerable and became severe last night. Denies fever, SOB, Chest pain and still in moderate distress 2 ry to pain CT abdomen was -ve for acute pancreatitis but lipase and alk phos were elevated. Has kidney injury but base line is unknown. Stated that his DM was not controlled in the last few months because of a groin and foot infections and both are healed on Physical exam except for mild cuts in his toes Pt will be admitted for further work up and Tx of acute pancreatitis Past Medical History:  
Diagnosis Date  Cholecystitis  Diabetes ([address]  High cholesterol  Hypertension  Neuropathy Past Surgical History:  
Procedure Laterality Date  HX CHOLECYSTECTOMY  HX ORTHOPAEDIC    
 jaw surgery  HX ORTHOPAEDIC    
 left foot History reviewed. No pertinent family history. Social History Social History  Marital status:  Spouse name: N/A  
 Number of children: N/A  
 Years of education: N/A Social History Main Topics  Smoking status: Never Smoker  Smokeless tobacco: None  Alcohol use No  
 Drug use: None  Sexual activity: Not Asked Other Topics Concern  None Social History Narrative  None Prior to Admission medications Medication Sig Start Date End Date Taking? Authorizing Provider  
lisinopril (PRINIVIL, ZESTRIL) 10 mg tablet Take 10 mg by mouth daily. Yes Joseline Mcfarland MD  
insulin glargine (LANTUS U-100 INSULIN) 100 unit/mL injection 80 Units by SubCUTAneous route nightly. Yes Joseline Mcfarland MD  
insulin aspart U-100 (NOVOLOG FLEXPEN U-100 INSULIN) 100 unit/mL inpn by SubCUTAneous route Before breakfast, lunch, dinner and at bedtime. Yes Joseline Mcfarland MD  
ergocalciferol (ERGOCALCIFEROL) 50,000 unit capsule Take 50,000 Units by mouth daily. Yes Joseline Mcfarland MD  
exenatide microspheres (BYDUREON BCISE) 2 mg/0.85 mL atIn by SubCUTAneous route every seven (7) days. Yes Joseline Mcfarland MD  
atorvastatin (LIPITOR) 20 mg tablet Take  by mouth daily. Yes Joseline Mcfarland MD  
 
 
Allergies Allergen Reactions  Amoxicillin Nausea and Vomiting  Gabapentin Nausea and Vomiting and Other (comments) Urinary retention  Metformin Nausea and Vomiting Review of Systems GENERAL: Patient alert, awake and oriented times 3, able to communicate full sentences but in distress 2 ry to pain HEENT: No change in vision, no earache, tinnitus, sore throat or sinus congestion. NECK: No pain or stiffness. PULMONARY: No shortness of breath, cough or wheeze. Cardiovascular: no pnd / orthopnea, no CP GASTROINTESTINAL: +ve  abdominal pain, +venausea, vomiting No diarrhea, melena or bright red blood per rectum. GENITOURINARY: No urinary frequency, urgency, hesitancy or dysuria. MUSCULOSKELETAL: No joint or muscle pain, no back pain, no recent trauma. DERMATOLOGIC: No rash, no itching, +ve Toes cuts Lt foot non infected ENDOCRINE: No polyuria, polydipsia, no heat or cold intolerance. No recent change in weight. HEMATOLOGICAL: No anemia or easy bruising or bleeding. NEUROLOGIC: No headache, seizures, numbness, tingling or weakness. Physical Exam:  
 
Physical Exam: 
Visit Vitals  BP (!) 151/91 (BP 1 Location: Left arm, BP Patient Position: At rest) Comment: notified nurse berny petersen  
 Pulse 97  Temp 97.7 °F (36.5 °C)  Resp 20  Wt 117.9 kg (260 lb)  SpO2 97% O2 Device: Room air Temp (24hrs), Av.9 °F (36.6 °C), Min:97.7 °F (36.5 °C), Max:98.3 °F (36.8 °C) General:  Alert, cooperative, in distress, appears stated age. Head: Normocephalic, without obvious abnormality, atraumatic. Eyes:  Conjunctivae/corneas clear. PERRL, EOMs intact. Nose: Nares normal. No drainage or sinus tenderness. Neck: Supple, symmetrical, trachea midline, no adenopathy, thyroid: no enlargement, no carotid bruit and no JVD. Lungs:   Clear to auscultation bilaterally. Heart:  Regular rate and rhythm, S1, S2 normal.  
  Abdomen: Soft, diffusely tender. Bowel sounds normal.   
Extremities: Extremities normal, atraumatic, no cyanosis or edema. Multiple non-infected toes aberrations Pulses: 2+ and symmetric all extremities. Skin:  No rashes or lesions Neurologic: AAOx3, No focal motor or sensory deficit. Labs Reviewed: 
 
Lab results reviewed. For significant abnormal values and values requiring intervention, see assessment and plan. CT and EKG Procedures/imaging: see electronic medical records for all procedures/Xrays and details which were not copied into this note but were reviewed prior to creation of Plan Assessment/Plan Principal Problem: 
  Acute pancreatitis (10/9/2018) Active Problems: 
  Type 2 diabetes mellitus with hyperglycemia ([facility] [address] (10/9/2018) GRACIELA (acute kidney injury) ([facility] [address] (10/9/2018) Elevated serum alkaline phosphatase level (10/9/2018) Hyponatremia (10/9/2018) Pt is admitted for severe abdominal pain 2 ry to Acute Pancreatitis R/o CBD stone ? Choledocholithiasis Uncontrolled DM ? Lipid Panel N/V Acute renal Injury, Hyponatremia and dehydration Denies alcohol intake and smoking GI consult is assigned US RUQ and MRCP are ordered NPO 
IVF Lipid panel Hold Lisinopril and Atorvastatin for now DVT/GI Prophylaxis: Hep SQ Plan of care is discussed in details with Patient/Family at bedside and agreed upon Addison Juarez MD 
10/9/2018 2:30 AM

## 2018-10-09 NOTE — CONSULTS
[email or web address]  [phone]    GASTROENTEROLOGY CONSULT      Impression:   1. ? Acute pancreatitis, CT shows normal pancreas, lipase 735, reports at least 3 prior episodes, ? Medication related, ? High triglycerides  2. LUQ pain, N/V  3. Uncontrolled DM  4. Hx cholecystitis s/p lap kerline last summer      Plan:     1. Continue IV fluids, antiemetics and analgesia  2. Await US results, may consider MRI/MRCP if CBD obstruction, although LFTs ok,?r/o pancreas divisum   3. Check IgG4  4. Continue current medical management  5. Diet per primary care team      Chief Complaint: Acute pancreatitis      HPI:  Tyree Hernadez is a 50 y.o. male who I am being asked to see in consultation for an opinion regarding the above. He presented to the ED with abdominal pain, nausea and vomiting. He has a hx of uncontrolled DM and admits to at least 3 prior episodes of pancreatitis that required hospitalization in the past. He states he was scheduled to follow up with a GI at [place] but appointment was cancelled. CT 10/8/2018 shows normal pancreas, lipase 735, Alk phos mildly elevated. He states pain is present but better since morphine was given. He feels previous attacks have been caused by DM meds, does not recall being tested for autoimmune pancreatitis. He is s/p lap kerline last year. PMH:   Past Medical History:   Diagnosis Date    Cholecystitis     Diabetes ([address]     High cholesterol     Hypertension     Neuropathy        PSH:   Past Surgical History:   Procedure Laterality Date    HX CHOLECYSTECTOMY      HX ORTHOPAEDIC      jaw surgery    HX ORTHOPAEDIC      left foot       Social HX:   Social History     Social History    Marital status:      Spouse name: N/A    Number of children: N/A    Years of education: N/A     Occupational History    Not on file.      Social History Main Topics    Smoking status: Never Smoker    Smokeless tobacco: Not on file    Alcohol use No    Drug use: Not on file    Sexual activity: Not on file     Other Topics Concern    Not on file     Social History Narrative    No narrative on file       FHX:   History reviewed. No pertinent family history. Allergy:   Allergies   Allergen Reactions    Amoxicillin Nausea and Vomiting    Gabapentin Nausea and Vomiting and Other (comments)     Urinary retention    Metformin Nausea and Vomiting       Patient Active Problem List   Diagnosis Code    Acute pancreatitis K85.90    Type 2 diabetes mellitus with hyperglycemia (HCC) E11.65    GRACIELA (acute kidney injury) ([facility] [address] N17.9    Elevated serum alkaline phosphatase level R74.8    Hyponatremia E87.1       Home Medications:     Prescriptions Prior to Admission   Medication Sig    lisinopril (PRINIVIL, ZESTRIL) 10 mg tablet Take 10 mg by mouth daily.  insulin glargine (LANTUS U-100 INSULIN) 100 unit/mL injection 80 Units by SubCUTAneous route nightly.  insulin aspart U-100 (NOVOLOG FLEXPEN U-100 INSULIN) 100 unit/mL inpn by SubCUTAneous route Before breakfast, lunch, dinner and at bedtime.  ergocalciferol (ERGOCALCIFEROL) 50,000 unit capsule Take 50,000 Units by mouth daily.  exenatide microspheres (BYDUREON BCISE) 2 mg/0.85 mL atIn by SubCUTAneous route every seven (7) days.  atorvastatin (LIPITOR) 20 mg tablet Take  by mouth daily. Review of Systems:     Constitutional: No fevers, chills, weight loss, fatigue. Skin: No rashes, pruritis, jaundice, ulcerations, erythema. HENT: No headaches, nosebleeds, sinus pressure, rhinorrhea, sore throat. Eyes: No visual changes, blurred vision, eye pain, photophobia, jaundice. Cardiovascular: No chest pain, heart palpitations. Respiratory: No cough, SOB, wheezing, chest discomfort, orthopnea. Gastrointestinal:    Genitourinary: No dysuria, bleeding, discharge, pyuria. Musculoskeletal: No weakness, arthralgias, wasting. Endo: No sweats. Heme: No bruising, easy bleeding. Allergies: As noted.    Neurological: Cranial nerves intact. Alert and oriented. Gait not assessed. Psychiatric:  No anxiety, depression, hallucinations. Visit Vitals    /79 (BP 1 Location: Left arm, BP Patient Position: At rest)    Pulse 77    Temp 97 °F (36.1 °C)    Resp 20    Wt 125.9 kg (277 lb 9.6 oz)    SpO2 98%       Physical Assessment:     constitutional: appearance: well developed, well nourished, normal habitus, no deformities, in no acute distress. skin: inspection: no rashes, ulcers, icterus or other lesions; no clubbing or telangiectasias. palpation: no induration or subcutaneos nodules. eyes: inspection: normal conjunctivae and lids; no jaundice pupils: normal  ENMT: mouth: normal oral mucosa,lips and gums; good dentition. oropharynx: normal tongue, hard and soft palate; posterior pharynx without erithema, exudate or lesions. neck: thyroid: normal size, consistency and position; no masses or tenderness. respiratory: effort: normal chest excursion; no intercostal retraction or accessory muscle use. cardiovascular: abdominal aorta: normal size and position; no bruits. palpation: PMI of normal size and position; normal rhythm; no thrill or murmurs. abdominal: abdomen: normal consistency; no tenderness or masses. hernias: no hernias appreciated. liver: normal size and consistency. spleen: not palpable. rectal: hemoccult/guaiac: not performed. musculoskeletal: digits and nails: no clubbing, cyanosis, petechiae or other inflammatory conditions. gait: normal gait and station head and neck: normal range of motion; no pain, crepitation or contracture. spine/ribs/pelvis: normal range of motion; no pain, deformity or contracture. neurologic: cranial nerves: II-XII normal.   psychiatric: judgement/insight: within normal limits. memory: within normal limits for recent and remote events. mood and affect: no evidence of depression, anxiety or agitation. orientation: oriented to time, space and person.         Basic Metabolic Profile   Recent Labs      10/09/18   0435   NA  137   K  4.5   CL  107   CO2  23   BUN  31*   GLU  284*   CA  6.9*         CBC w/Diff    Recent Labs      10/09/18   0435   WBC  10.7   RBC  4.06*   HGB  12.0*   HCT  34.6*   MCV  85.2   MCH  29.6   MCHC  34.7   RDW  13.5   PLT  179    Recent Labs      10/09/18   0435   GRANS  67   LYMPH  24   EOS  2        Hepatic Function   Recent Labs      10/08/18   2100   ALB  2.6*   TP  7.5   TBILI  0.3   SGOT  23   AP  134*   LPSE  735*        Coags   No results for input(s): PTP, INR, APTT in the last 72 hours. No lab exists for component: ANGELICAT        ОЛЕГ Holly. [facility], [address] [place] [address]  Cell: [phone]  [email or web address]

## 2018-10-09 NOTE — PROGRESS NOTES
[facility] Hospitalist Group Progress Note Subjective:  
Abdominal pain 8/10. Not as intense. No abdominal cramping and stabbing pain as before. Abdominal pain intermittent center of abdomen. Last BM Monday. Normal for him. Nausea without vomiting. Pain medications and antiemetics are helping Objective:  
VS: /84 (BP 1 Location: Left arm, BP Patient Position: At rest)  Pulse 83  Temp 97.7 °F (36.5 °C)  Resp 20  Wt 125.9 kg (277 lb 9.6 oz)  SpO2 97% General:  Alert, NAD, dry oral mucosa Cardiovascular:  RRR Pulmonary:  LSC throughout; respiratory effort WNL 
GI:  +BS in all four quadrants, soft, non-tender Extremities:  No edema; 2+ dorsalis pedis pulses bilaterally Neuro: alert and oriented x3 Assessment/Plan:  
Assessment 1. Acute pancreatitis 2. DM II with hyperglycemia 3. GRACIELA 4. Elevated serum alkaline phosphatase level 5. HTN 6. HLD Plan 1. US abdomen, MRI abd/pelvis, NPO, IVF, pain management, prn antinausea medication 2. Monitor CBC and metabolic panel 3. POC glucose, SSI, Lantus 4. GI consult, input appreciated

## 2018-10-09 NOTE — PROGRESS NOTES
conducted an Initial consultation and Spiritual Assessment for Tyree Hernadez, who is a 50 y.o.,male. Patients Primary Language is: [place]. According to the patients EMR Hinduism Affiliation is: Yazidi. The reason the Patient came to the hospital is:  
Patient Active Problem List  
 Diagnosis Date Noted  Acute pancreatitis 10/09/2018  Type 2 diabetes mellitus with hyperglycemia ([address] 10/09/2018  GRACIELA (acute kidney injury) ([address] 10/09/2018  Elevated serum alkaline phosphatase level 10/09/2018  Hyponatremia 10/09/2018 The  provided the following Interventions: 
Initiated a relationship of care and support. Patient was up washing himself with a cloth in the bathroom. Explored issues of jesus. Patient confirmed his Hinduism affiliation as Yazidi; he said he is in the [employer] and lived mostly in [place], presently stationed in [place]. Listened empathically to patient. He said he does not have any concerns at this time. He stated that he experiences pain due to pancreatitis and is being prepared for tests, which is why he is NPO at this time. Provided chaplaincy education. Offered assurance of prayer for patient. Chart reviewed. The following outcomes were achieved: 
Patient shared limited information about his medical narrative and spiritual journey. Patient expressed gratitude for the 's visit. Assessment: 
Patient does not have any Hinduism or cultural needs that will affect patients preferences in health care. Patient did not indicate any other spiritual or Hinduism issues which require Spiritual Care Services interventions at this time. Plan: 
Chaplains will continue to follow and will provide pastoral care as needed or requested.  recommends bedside caregivers page  on duty if patient shows signs of acute spiritual or emotional distress. The Rev. [address] Spiritual Care Services [address] [facility] [facility] [phone] / [address] [phone]

## 2018-10-09 NOTE — PROGRESS NOTES
Reason for Admission:   Acute pancreatitis, GRACIELA, hyperglycemia RRAT Score:          5 Plan for utilizing home health:      tbd Likelihood of Readmission:  Green/low Transition of Care Plan:     Pt is AOx4 and reports he was independent prior to hospitalization. He states he has a pcp Dr. Raquel Beltran at the [facility] where his wife works. Per pt, he has no DMEs at home and drives himself to his appointments. His wife will pick him up when discharged. Care Management Interventions PCP Verified by CM: Yes (Dr. Raquel Beltran with [facility]) Palliative Care Criteria Met (RRAT>21 & CHF Dx)?: No 
Mode of Transport at Discharge: Other (see comment) (family) Transition of Care Consult (CM Consult): Discharge Planning Physical Therapy Consult: No 
Occupational Therapy Consult: No 
Speech Therapy Consult: No 
Current Support Network: Lives with Spouse Confirm Follow Up Transport: Self Plan discussed with Pt/Family/Caregiver:  Yes

## 2018-10-09 NOTE — DIABETES MGMT
Diabetes Patient/Family Education Record Factors That  May Influence Patients Ability  to Learn or  Comply with Recommendations 
 []   Language barrier    []   Cultural needs   []   Motivation  
 []   Cognitive limitation    []   Physical   []   Education  
 []   Physiological factors   []   Hearing/vision/speaking impairment   []   Amish beliefs []   Financial factors   []  Other:   [x]  No factors identified at this time. Person Instructed: 
 [x]   Patient   []   Family   []  Other Preference for Learning: 
 [x]   Verbal   []   Written   []  Demonstration Level of Comprehension & Competence:   
[x]  Good                                      [] Fair                                     []  Poor                             []  Needs Reinforcement  
[x]  Teachback completed Education Component:  
[x]  Medication management, including how to administer insulin (if appropriate) and potential medication interactions Takes Lantus 50 units twice daily, and fast acting mealtime insulin 4 times daily based on his BG readings. [x]  Nutritional management   
[]  Exercise [x]  Signs, symptoms, and treatment of hyperglycemia and hypoglycemia [x] Prevention, recognition and treatment of hyperglycemia and hypoglycemia [x]  Importance of blood glucose monitoring and how to obtain a blood glucose meter has a meter and supplies at home. Checks his blood glucose 6 times daily. Recommended pt try and get the [employer] [place] glucose monitoring system. Will provide literature to pt. [x]  Instruction on use of the blood glucose meter [x]  Discuss the importance of HbA1C monitoring 10.8% equivalent to an average blood glucose of 263 mg/dl over the past 2-3 years. Patient expresses frustration, \"At one point I got it down to 7% from 12 in a matter of a few months. \"  
[x]  Sick day guidelines  
[]  Proper use and disposal of lancets, needles, syringes or insulin pens (if appropriate) [x]  Potential long-term complications (retinopathy, kidney disease, neuropathy, foot care) [x] Information about whom to contact in case of emergency or for more information   
[x]  Goal:  Patient/family will demonstrate understanding of Diabetes Self Management Skills by: (date) ___10/14/18___ Plan for post-discharge education or self-management support: 
  [x] Outpatient class schedule provided            [] Patient Declined 
  [] Scheduled for outpatient classes (date) _______ Jaci Odom RN 
[phone] [phone]

## 2018-10-09 NOTE — ED NOTES
I performed a brief evaluation, including history and physical, of the patient here in triage and I have determined that pt will need further treatment and evaluation from the main side ER physician. I have placed initial orders to help in expediting patients care. October 08, 2018 at 8:32 PM - ОЛЕГ Cueto There were no vitals taken for this visit.

## 2018-10-09 NOTE — ED TRIAGE NOTES
Acute onset of nausea/vomiting/LUQ at approx 1900; states he went to sleep and pain woke him. Also reports left groin abscess/drainage. Reports left testicle pain

## 2018-10-09 NOTE — ROUTINE PROCESS
Bedside and Verbal shift change report given to KERRI Cedillo (oncoming nurse) by Yumiko Salas (offgoing nurse). Report included the following information SBAR, ED Summary, Intake/Output, MAR, Recent Results and Cardiac Rhythm sinus rhythm.

## 2018-10-09 NOTE — ROUTINE PROCESS
Bedside shift change report given to [address] [place] [address] Avenue (oncoming nurse) by Michael Cleveland RN (offgoing nurse). Report included the following information SBAR, Intake/Output, MAR, Recent Results and Cardiac Rhythm NSR.

## 2018-10-09 NOTE — ED PROVIDER NOTES
HPI Comments: Hector Aggarwal is a 50 y.o. male with PMHx of diabetes, HTN, cholecystitis, and neuropathy presenting to the ED c/o acute onset of left upper quadrant severe abdominal pain starting 2 hours ago. Associated symptoms are nausea and vomiting. Patient states that he felt fine this morning and woke up from a nap around 7 pm in excruciating pain. Has had persistent non bloody, nonbilious vomiting since. Denies diarrhea, fevers, chills, CP or SOB. Reports that he has had episodes of acute pancreatitis in the past. Denies any alcohol use or recent change in medications. States that he had his gallbladder out last summer. No modifying or aggravating factors were reported. Denies any further complaints or symptoms at the moment. Past Medical History:  
Diagnosis Date  Cholecystitis  Diabetes ([facility] [address]  High cholesterol  Hypertension  Neuropathy Past Surgical History:  
Procedure Laterality Date  HX CHOLECYSTECTOMY  HX ORTHOPAEDIC    
 jaw surgery  HX ORTHOPAEDIC    
 left foot History reviewed. No pertinent family history. Social History Social History  Marital status:  Spouse name: N/A  
 Number of children: N/A  
 Years of education: N/A Occupational History  Not on file. Social History Main Topics  Smoking status: Never Smoker  Smokeless tobacco: Not on file  Alcohol use No  
 Drug use: Not on file  Sexual activity: Not on file Other Topics Concern  Not on file Social History Narrative  No narrative on file ALLERGIES: Amoxicillin; Gabapentin; and Metformin Review of Systems Constitutional: Negative. Negative for chills and fever. HENT: Negative. Negative for congestion. Eyes: Negative. Negative for visual disturbance. Respiratory: Negative. Negative for shortness of breath. Cardiovascular: Negative. Negative for chest pain and leg swelling. Gastrointestinal: Positive for abdominal pain, nausea and vomiting. Negative for blood in stool and diarrhea. Genitourinary: Negative. Negative for dysuria. Musculoskeletal: Negative. Negative for myalgias. Skin: Negative. Negative for rash and wound. Neurological: Negative. Negative for dizziness, weakness and light-headedness. Psychiatric/Behavioral: Negative. Negative for self-injury. All other systems reviewed and are negative. Vitals:  
 10/08/18 2345 10/09/18 0000 10/09/18 0015 10/09/18 0030 BP: 141/80 138/74 141/86 142/84 Pulse: 96 98 95 99 Resp: 13 23 16 19 Temp:      
SpO2: 96% 96% 94% 99% Weight:      
      
 
Physical Exam  
Constitutional: He is oriented to person, place, and time. He appears well-developed and well-nourished. He appears distressed (secondary to pain and nausea/vomiting). HENT:  
Head: Normocephalic and atraumatic. Mouth/Throat: Mucous membranes are dry. Eyes: Conjunctivae and EOM are normal. Pupils are equal, round, and reactive to light. Neck: Normal range of motion. Neck supple. Cardiovascular: Regular rhythm, S1 normal and S2 normal.  Tachycardia present. Exam reveals no gallop and no friction rub. No murmur heard. Pulmonary/Chest: Effort normal and breath sounds normal. No accessory muscle usage. No respiratory distress. Abdominal: Soft. Normal appearance. He exhibits no distension. There is no rigidity and no rebound. Moderate TTP in the LUQ and epigastrium, mild TTP in the periumbilical and RLQ. No peritoneal signs. Genitourinary:  
Genitourinary Comments: No scrotal swelling, induration or fluctuance Musculoskeletal: Normal range of motion. He exhibits no edema or tenderness. Neurological: He is alert and oriented to person, place, and time. He has normal strength. No cranial nerve deficit or sensory deficit. Coordination normal.  
Skin: Skin is warm and intact. No rash noted. Psychiatric: His speech is normal. His mood appears anxious. Vitals reviewed. MDM Number of Diagnoses or Management Options Acute pancreatitis, unspecified complication status, unspecified pancreatitis type:  
GRACIELA (acute kidney injury) ([facility] [address]): Hyperglycemia:  
Diagnosis management comments: Jared Ortiz is a 50 y.o. Male coming in with sudden onset LUQ and epigastric pain. Also some pain radiating to the lower abdomen. Ddx includes acute pancreatitis, acute nephrolithiasis, bowel obstruction, pyelonephritis, perforated peptic ulcer, among others. CT done and unremarkable. Lipase mildly elevated, ??early acute pancreatitis. No EtOH and prior cholecystectomy. Initially sepsis protocol initiated, but lactate cleared with IVF and normal temp and WBC count. Will not start abx at this time as there is no source or suspected source of infection. Creatinine at 2, unsure baseline, possible GRACIELA due to vomiting and dehydration. Will continue rehydration and pain and nausea control and plan for admission. ED Course Procedures Vitals: 
Patient Vitals for the past 12 hrs: 
 Temp Pulse Resp BP SpO2  
10/09/18 0030 - 99 19 142/84 99 % 10/09/18 0015 - 95 16 141/86 94 % 10/09/18 0000 - 98 23 138/74 96 % 10/08/18 2345 - 96 13 141/80 96 % 10/08/18 2330 - - - 127/73 98 % 10/08/18 2315 - - - 123/74 96 % 10/08/18 2300 - 95 12 114/65 96 % 10/08/18 2245 - - - 122/66 95 % 10/08/18 2230 - - - 131/64 94 % 10/08/18 2215 - - - 129/60 93 % 10/08/18 2200 - - - 116/60 94 % 10/08/18 2145 - - - 137/70 94 % 10/08/18 2115 - - - 136/78 96 % 10/08/18 2108 - (!) 105 20 (!) 142/93 100 % 10/08/18 2035 98.3 °F (36.8 °C) (!) 118 28 (!) 132/104 100 % Medications Ordered: 
Medications  
fentaNYL citrate (PF) injection 50 mcg (50 mcg IntraVENous Given 10/8/18 2102) [facility] injection 4 mg (4 mg IntraVENous Given 10/8/18 2100) sodium chloride 0.9 % bolus infusion 1,000 mL (0 mL IntraVENous IV Completed 10/8/18 2202)  
ketorolac (TORADOL) injection 30 mg (30 mg IntraVENous Given 10/8/18 2102) sodium chloride 0.9 % bolus infusion 1,000 mL (0 mL IntraVENous IV Completed 10/8/18 2306) insulin regular (NOVOLIN R, HUMULIN R) injection 10 Units (10 Units IntraVENous Given 10/8/18 2336) potassium chloride 10 mEq in 100 ml IVPB (10 mEq IntraVENous New Bag 10/8/18 2339)  
sodium chloride 0.9 % bolus infusion 1,000 mL (1,000 mL IntraVENous New Bag 10/8/18 2332) ondansetron [facility]) injection 4 mg (4 mg IntraVENous Given 10/8/18 2334) potassium chloride (K-DUR, KLOR-CON) SR tablet 40 mEq (40 mEq Oral Given 10/8/18 2357) fentaNYL citrate (PF) injection 50 mcg (50 mcg IntraVENous Given 10/8/18 2354) fentaNYL citrate (PF) injection 50 mcg (50 mcg IntraVENous Given 10/9/18 0042) Lab Findings: 
Recent Results (from the past 12 hour(s)) CBC WITH AUTOMATED DIFF Collection Time: 10/08/18  9:00 PM  
Result Value Ref Range WBC 9.3 4.6 - 13.2 K/uL  
 RBC 5.28 4.70 - 5.50 M/uL  
 HGB 15.5 13.0 - 16.0 g/dL HCT 45.7 36.0 - 48.0 % MCV 86.6 74.0 - 97.0 FL  
 MCH 29.4 24.0 - 34.0 PG  
 MCHC 33.9 31.0 - 37.0 g/dL  
 RDW 13.4 11.6 - 14.5 % PLATELET 447 916 - 328 K/uL MPV 10.7 9.2 - 11.8 FL  
 NEUTROPHILS 51 40 - 73 % LYMPHOCYTES 36 21 - 52 % MONOCYTES 9 3 - 10 % EOSINOPHILS 3 0 - 5 % BASOPHILS 1 0 - 2 %  
 ABS. NEUTROPHILS 4.8 1.8 - 8.0 K/UL  
 ABS. LYMPHOCYTES 3.3 0.9 - 3.6 K/UL  
 ABS. MONOCYTES 0.9 0.05 - 1.2 K/UL  
 ABS. EOSINOPHILS 0.3 0.0 - 0.4 K/UL  
 ABS. BASOPHILS 0.1 0.0 - 0.1 K/UL  
 DF AUTOMATED METABOLIC PANEL, BASIC Collection Time: 10/08/18  9:00 PM  
Result Value Ref Range Sodium 132 (L) 136 - 145 mmol/L Potassium 3.8 3.5 - 5.5 mmol/L Chloride 97 (L) 100 - 108 mmol/L  
 CO2 26 21 - 32 mmol/L Anion gap 9 3.0 - 18 mmol/L Glucose 401 (HH) 74 - 99 mg/dL  BUN 34 (H) 7.0 - 18 MG/DL  
 Creatinine 1.97 (H) 0.6 - 1.3 MG/DL  
 BUN/Creatinine ratio 17 12 - 20 GFR est AA 44 (L) >60 ml/min/1.73m2 GFR est non-AA 36 (L) >60 ml/min/1.73m2 Calcium 8.5 8.5 - 10.1 MG/DL  
HEPATIC FUNCTION PANEL Collection Time: 10/08/18  9:00 PM  
Result Value Ref Range Protein, total 7.5 6.4 - 8.2 g/dL Albumin 2.6 (L) 3.4 - 5.0 g/dL Globulin 4.9 (H) 2.0 - 4.0 g/dL A-G Ratio 0.5 (L) 0.8 - 1.7 Bilirubin, total 0.3 0.2 - 1.0 MG/DL Bilirubin, direct 0.1 0.0 - 0.2 MG/DL Alk. phosphatase 134 (H) 45 - 117 U/L  
 AST (SGOT) 23 15 - 37 U/L  
 ALT (SGPT) 24 16 - 61 U/L  
LIPASE Collection Time: 10/08/18  9:00 PM  
Result Value Ref Range Lipase 735 (H) 73 - 393 U/L  
POC LACTIC ACID Collection Time: 10/08/18  9:12 PM  
Result Value Ref Range Lactic Acid (POC) 3.0 (HH) 0.4 - 2.0 mmol/L  
URINALYSIS W/ RFLX MICROSCOPIC Collection Time: 10/08/18 11:40 PM  
Result Value Ref Range Color YELLOW Appearance CLEAR Specific gravity 1.025 1.005 - 1.030    
 pH (UA) 5.0 5.0 - 8.0 Protein 300 (A) NEG mg/dL Glucose >1000 (A) NEG mg/dL Ketone NEGATIVE  NEG mg/dL Bilirubin NEGATIVE  NEG Blood SMALL (A) NEG Urobilinogen 0.2 0.2 - 1.0 EU/dL Nitrites NEGATIVE  NEG Leukocyte Esterase NEGATIVE  NEG    
URINE MICROSCOPIC ONLY Collection Time: 10/08/18 11:40 PM  
Result Value Ref Range WBC 0 to 3 0 - 4 /hpf  
 RBC 4 to 10 0 - 5 /hpf Epithelial cells 2+ 0 - 5 /lpf Bacteria 1+ (A) NEG /hpf Mucus 2+ (A) NEG /lpf Amorphous Crystals 2+ (A) NEG Hyaline cast 0 to 3 0 - 2 /lpf Granular cast 4 to 10 NEG /lpf POC LACTIC ACID Collection Time: 10/09/18 12:17 AM  
Result Value Ref Range Lactic Acid (POC) 0.6 0.4 - 2.0 mmol/L  
GLUCOSE, POC Collection Time: 10/09/18 12:43 AM  
Result Value Ref Range Glucose (POC) 223 (H) 70 - 110 mg/dL X-ray, CT or radiology findings or impressions: 
CT ABD PELV WO CONT Final Result No definite acute inflammation identified. No urinary tract calcification or 
hydronephrosis. Prior cholecystectomy. Additional chronic incidentals as above. Progress notes, consult notes, or additional procedure notes: 
Consult:  Discussed care with Dr. Kamilah Ramirez, Hospitalist  Standard discussion; including history of patients chief complaint, available diagnostic results, and treatment course. Agrees with plan for admission for suspected pancreatitis and acute kidney injury. 12:32 AM, 10/8/2018 Reevaluation of the patient:  
Patient symptomatically improved. Pain still present and has had additional episodes of vomiting here in the ED, although symptoms greatly improved. Discussed all results and need for admission and patient in agreement with plan. Diagnosis: 1. Acute pancreatitis, unspecified complication status, unspecified pancreatitis type 2. GRACIELA (acute kidney injury) ([facility] [address] 3. Hyperglycemia Disposition: Admit Follow-up Information None Patient's Medications Start Taking No medications on file Continue Taking ATORVASTATIN (LIPITOR) 20 MG TABLET    Take  by mouth daily. ERGOCALCIFEROL (ERGOCALCIFEROL) 50,000 UNIT CAPSULE    Take 50,000 Units by mouth daily. EXENATIDE MICROSPHERES (BYDUREON BCISE) 2 MG/0.85 ML ATIN    by SubCUTAneous route every seven (7) days. INSULIN ASPART U-100 (NOVOLOG FLEXPEN U-100 INSULIN) 100 UNIT/ML INPN    by SubCUTAneous route Before breakfast, lunch, dinner and at bedtime. INSULIN GLARGINE (LANTUS U-100 INSULIN) 100 UNIT/ML INJECTION    80 Units by SubCUTAneous route nightly. LISINOPRIL (PRINIVIL, ZESTRIL) 10 MG TABLET    Take 10 mg by mouth daily. These Medications have changed No medications on file Stop Taking No medications on file Scribe Attestation Camelia Severino acting as a scribe for and in the presence of Clarence Julian MD     
October 08, 2018 at 9:14 PM 
    
 Provider Attestation:     
I personally performed the services described in the documentation, reviewed the documentation, as recorded by the scribe in my presence, and it accurately and completely records my words and actions.  October 08, 2018 at 9:14 PM - Fermin Shah MD

## 2018-10-09 NOTE — DIABETES MGMT
GLYCEMIC CONTROL AND NUTRITION Assessment/Recommendations: 
Fasting lab glucose this am 284 mg/dl Noted basal and corrective insulin ordered Will assess need for insulin adjustments Will continue inpatient monitoring. Most recent blood glucose values: 
Results for Britton Clement (MRN [id number]) as of 10/9/2018 13:04 Ref. Range 10/9/2018 00:43 10/9/2018 02:22 10/9/2018 07:44 10/9/2018 11:15 GLUCOSE,FAST - POC Latest Ref Range: 70 - 110 mg/dL 223 (H) 252 (H) 299 (H) 207 (H) Current A1C of 10.8 % is equivalent to average blood glucose of 263 mg/dl over the past 2-3 months. Current hospital diabetes medications:  
Lantus insulin 10 units 2 times daily Lispro corrective insulin coverage every 6 hours Home diabetes medications: 
Lantus insulin 50 units twice daily Fast acting insulin 4 times daily based on blood glucose readings. Max of 15 units/meal 
Diet:   
Currently NPO Education:  _x__Refer to Diabetes Education Record  
          ____Education not indicated at this time Leeanne Gooden RN CDE Ext [phone] Pager [phone]

## 2018-10-10 VITALS
HEART RATE: 81 BPM | RESPIRATION RATE: 18 BRPM | DIASTOLIC BLOOD PRESSURE: 99 MMHG | TEMPERATURE: 97.4 F | WEIGHT: 277.6 LBS | SYSTOLIC BLOOD PRESSURE: 169 MMHG | OXYGEN SATURATION: 96 %

## 2018-10-10 LAB
ANA SER QL: NEGATIVE
ANION GAP SERPL CALC-SCNC: 7 MMOL/L (ref 3–18)
BASOPHILS # BLD: 0 K/UL (ref 0–0.1)
BASOPHILS NFR BLD: 1 % (ref 0–2)
BUN SERPL-MCNC: 16 MG/DL (ref 7–18)
BUN/CREAT SERPL: 13 (ref 12–20)
CALCIUM SERPL-MCNC: 7.7 MG/DL (ref 8.5–10.1)
CHLORIDE SERPL-SCNC: 109 MMOL/L (ref 100–108)
CO2 SERPL-SCNC: 24 MMOL/L (ref 21–32)
CREAT SERPL-MCNC: 1.2 MG/DL (ref 0.6–1.3)
DIFFERENTIAL METHOD BLD: ABNORMAL
EOSINOPHIL # BLD: 0.4 K/UL (ref 0–0.4)
EOSINOPHIL NFR BLD: 6 % (ref 0–5)
ERYTHROCYTE [DISTWIDTH] IN BLOOD BY AUTOMATED COUNT: 13.6 % (ref 11.6–14.5)
GLUCOSE BLD STRIP.AUTO-MCNC: 175 MG/DL (ref 70–110)
GLUCOSE BLD STRIP.AUTO-MCNC: 184 MG/DL (ref 70–110)
GLUCOSE BLD STRIP.AUTO-MCNC: 223 MG/DL (ref 70–110)
GLUCOSE BLD STRIP.AUTO-MCNC: 225 MG/DL (ref 70–110)
GLUCOSE SERPL-MCNC: 202 MG/DL (ref 74–99)
HCT VFR BLD AUTO: 37.6 % (ref 36–48)
HGB BLD-MCNC: 12.5 G/DL (ref 13–16)
IGG4 SER-MCNC: 2 MG/DL (ref 2–96)
LIPASE SERPL-CCNC: 240 U/L (ref 73–393)
LYMPHOCYTES # BLD: 2.9 K/UL (ref 0.9–3.6)
LYMPHOCYTES NFR BLD: 41 % (ref 21–52)
MAGNESIUM SERPL-MCNC: 2.3 MG/DL (ref 1.6–2.6)
MCH RBC QN AUTO: 29.4 PG (ref 24–34)
MCHC RBC AUTO-ENTMCNC: 33.2 G/DL (ref 31–37)
MCV RBC AUTO: 88.5 FL (ref 74–97)
MONOCYTES # BLD: 0.4 K/UL (ref 0.05–1.2)
MONOCYTES NFR BLD: 6 % (ref 3–10)
NEUTS SEG # BLD: 3.3 K/UL (ref 1.8–8)
NEUTS SEG NFR BLD: 46 % (ref 40–73)
PHOSPHATE SERPL-MCNC: 2 MG/DL (ref 2.5–4.9)
PLATELET # BLD AUTO: 182 K/UL (ref 135–420)
PMV BLD AUTO: 10.5 FL (ref 9.2–11.8)
POTASSIUM SERPL-SCNC: 4.7 MMOL/L (ref 3.5–5.5)
RBC # BLD AUTO: 4.25 M/UL (ref 4.7–5.5)
SEE BELOW:, 164879: NORMAL
SODIUM SERPL-SCNC: 140 MMOL/L (ref 136–145)
WBC # BLD AUTO: 7.2 K/UL (ref 4.6–13.2)

## 2018-10-10 PROCEDURE — 82962 GLUCOSE BLOOD TEST: CPT

## 2018-10-10 PROCEDURE — 85025 COMPLETE CBC W/AUTO DIFF WBC: CPT | Performed by: INTERNAL MEDICINE

## 2018-10-10 PROCEDURE — 74011250636 HC RX REV CODE- 250/636: Performed by: INTERNAL MEDICINE

## 2018-10-10 PROCEDURE — 80048 BASIC METABOLIC PNL TOTAL CA: CPT | Performed by: INTERNAL MEDICINE

## 2018-10-10 PROCEDURE — 84100 ASSAY OF PHOSPHORUS: CPT | Performed by: INTERNAL MEDICINE

## 2018-10-10 PROCEDURE — 83690 ASSAY OF LIPASE: CPT | Performed by: PHYSICIAN ASSISTANT

## 2018-10-10 PROCEDURE — 83735 ASSAY OF MAGNESIUM: CPT | Performed by: INTERNAL MEDICINE

## 2018-10-10 PROCEDURE — 74011636637 HC RX REV CODE- 636/637: Performed by: INTERNAL MEDICINE

## 2018-10-10 PROCEDURE — 36415 COLL VENOUS BLD VENIPUNCTURE: CPT | Performed by: INTERNAL MEDICINE

## 2018-10-10 RX ORDER — TRAMADOL HYDROCHLORIDE 50 MG/1
50 TABLET ORAL
Qty: 16 TAB | Refills: 0 | Status: SHIPPED | OUTPATIENT
Start: 2018-10-10 | End: 2018-10-17

## 2018-10-10 RX ORDER — INSULIN LISPRO 100 [IU]/ML
INJECTION, SOLUTION INTRAVENOUS; SUBCUTANEOUS
Status: DISCONTINUED | OUTPATIENT
Start: 2018-10-10 | End: 2018-10-10 | Stop reason: HOSPADM

## 2018-10-10 RX ADMIN — HEPARIN SODIUM 5000 UNITS: 5000 INJECTION, SOLUTION INTRAVENOUS; SUBCUTANEOUS at 06:13

## 2018-10-10 RX ADMIN — INSULIN GLARGINE 10 UNITS: 100 INJECTION, SOLUTION SUBCUTANEOUS at 10:00

## 2018-10-10 RX ADMIN — INSULIN LISPRO 2 UNITS: 100 INJECTION, SOLUTION INTRAVENOUS; SUBCUTANEOUS at 06:19

## 2018-10-10 RX ADMIN — SODIUM CHLORIDE, SODIUM LACTATE, POTASSIUM CHLORIDE, AND CALCIUM CHLORIDE 150 ML/HR: 600; 310; 30; 20 INJECTION, SOLUTION INTRAVENOUS at 06:24

## 2018-10-10 RX ADMIN — INSULIN LISPRO 4 UNITS: 100 INJECTION, SOLUTION INTRAVENOUS; SUBCUTANEOUS at 00:40

## 2018-10-10 RX ADMIN — SODIUM CHLORIDE, SODIUM LACTATE, POTASSIUM CHLORIDE, AND CALCIUM CHLORIDE 150 ML/HR: 600; 310; 30; 20 INJECTION, SOLUTION INTRAVENOUS at 00:43

## 2018-10-10 RX ADMIN — INSULIN LISPRO 6 UNITS: 100 INJECTION, SOLUTION INTRAVENOUS; SUBCUTANEOUS at 12:37

## 2018-10-10 RX ADMIN — ONDANSETRON HYDROCHLORIDE 4 MG: 2 SOLUTION INTRAMUSCULAR; INTRAVENOUS at 04:52

## 2018-10-10 RX ADMIN — MORPHINE SULFATE 2 MG: 2 INJECTION, SOLUTION INTRAMUSCULAR; INTRAVENOUS at 04:52

## 2018-10-10 NOTE — DISCHARGE SUMMARY
[facility]  Discharge Summary       Patient: Priti De Los Santos Age: 50 y.o. : 1970 MR#: [id number] SSN: [id number]  PCP on record: UNKNOWN  Admit date: 10/8/2018  Discharge date: 10/10/2018    Disposition:    [x]Home   []Home with Home Health   []SNF/NH   []Rehab   []Home with family   []Alternate Facility:____________________    Discharge Diagnoses:                             Acute pancreatitis  DMII with hyperglycemia  HTN  DLD       Discharge Medications:     Current Discharge Medication List      START taking these medications    Details   traMADol (ULTRAM) 50 mg tablet Take 1 Tab by mouth every six (6) hours as needed for Pain for up to 7 days. Max Daily Amount: 200 mg. Qty: 16 Tab, Refills: 0    Associated Diagnoses: Acute pancreatitis, unspecified complication status, unspecified pancreatitis type         CONTINUE these medications which have NOT CHANGED    Details   lisinopril (PRINIVIL, ZESTRIL) 10 mg tablet Take 10 mg by mouth daily. insulin glargine (LANTUS U-100 INSULIN) 100 unit/mL injection 80 Units by SubCUTAneous route nightly. insulin aspart U-100 (NOVOLOG FLEXPEN U-100 INSULIN) 100 unit/mL inpn by SubCUTAneous route Before breakfast, lunch, dinner and at bedtime. exenatide microspheres (BYDUREON BCISE) 2 mg/0.85 mL atIn by SubCUTAneous route every seven (7) days. STOP taking these medications       ergocalciferol (ERGOCALCIFEROL) 50,000 unit capsule Comments:   Reason for Stopping:         atorvastatin (LIPITOR) 20 mg tablet Comments:   Reason for Stopping:               Consults:    - GI  Significant Diagnostic Studies:   -  MRI abdomen with MRCP    Hospital Course by Problem   50year old male with worsening back pain and abdominal pain, intense in nature with no relief at home. Associated with N/V. CT abdomen acute pancreatitis with elevated lipase and alk phos. Patient admitted to treat pancreatitis. GI consult.  NPO and IVF. GRACIELA noted upon arrival. GRACIELA resolved prior to discharge. MRI abdomen with MRCP testing. Follow up with GI as outpatient. Hold statin. Later date recommended new therapy to treat triglycerides. GI will decide once patient follows up. Pending results from KENNY and IgG4. Hyperglycemia noted during this admission. Tight regimen with insulin d/t NPO status. Patient to resume home regimen since no dietary restrictions on intake. Patient to follow up with PCP after discharge. BP lisinopril on hold until follow up appointment. BP WNL JNCVII. Today's examination of the patient revealed:     Subjective:    All 10 systems reviewed and negative   Objective:   VS:   Visit Vitals    BP (!) 169/99 (BP 1 Location: Left arm, BP Patient Position: At rest)    Pulse 81    Temp 97.4 °F (36.3 °C)    Resp 18    Wt 125.9 kg (277 lb 9.6 oz)    SpO2 96%      Tmax/24hrs: Temp (24hrs), Av.3 °F (36.3 °C), Min:97 °F (36.1 °C), Max:98 °F (36.7 °C)     Input/Output: No intake or output data in the 24 hours ending 10/10/18 1058    General:  Alert, NAD  Cardiovascular:  RRR  Pulmonary:  LSC throughout; respiratory effort WNL  GI:  +BS in all four quadrants, soft, non-tender  Extremities:  No edema; 2+ dorsalis pedis pulses bilaterally  Additional:      Labs:    Recent Results (from the past 24 hour(s))   GLUCOSE, POC    Collection Time: 10/09/18 11:15 AM   Result Value Ref Range    Glucose (POC) 207 (H) 70 - 110 mg/dL   GLUCOSE, POC    Collection Time: 10/09/18  3:05 PM   Result Value Ref Range    Glucose (POC) 170 (H) 70 - 110 mg/dL   GLUCOSE, POC    Collection Time: 10/09/18  5:49 PM   Result Value Ref Range    Glucose (POC) 147 (H) 70 - 110 mg/dL   GLUCOSE, POC    Collection Time: 10/10/18 12:36 AM   Result Value Ref Range    Glucose (POC) 225 (H) 70 - 038 mg/dL   METABOLIC PANEL, BASIC    Collection Time: 10/10/18  2:23 AM   Result Value Ref Range    Sodium 140 136 - 145 mmol/L    Potassium 4.7 3.5 - 5.5 mmol/L    Chloride 109 (H) 100 - 108 mmol/L    CO2 24 21 - 32 mmol/L    Anion gap 7 3.0 - 18 mmol/L    Glucose 202 (H) 74 - 99 mg/dL    BUN 16 7.0 - 18 MG/DL    Creatinine 1.20 0.6 - 1.3 MG/DL    BUN/Creatinine ratio 13 12 - 20      GFR est AA >60 >60 ml/min/1.73m2    GFR est non-AA >60 >60 ml/min/1.73m2    Calcium 7.7 (L) 8.5 - 10.1 MG/DL   MAGNESIUM    Collection Time: 10/10/18  2:23 AM   Result Value Ref Range    Magnesium 2.3 1.6 - 2.6 mg/dL   PHOSPHORUS    Collection Time: 10/10/18  2:23 AM   Result Value Ref Range    Phosphorus 2.0 (L) 2.5 - 4.9 MG/DL   CBC WITH AUTOMATED DIFF    Collection Time: 10/10/18  2:23 AM   Result Value Ref Range    WBC 7.2 4.6 - 13.2 K/uL    RBC 4.25 (L) 4.70 - 5.50 M/uL    HGB 12.5 (L) 13.0 - 16.0 g/dL    HCT 37.6 36.0 - 48.0 %    MCV 88.5 74.0 - 97.0 FL    MCH 29.4 24.0 - 34.0 PG    MCHC 33.2 31.0 - 37.0 g/dL    RDW 13.6 11.6 - 14.5 %    PLATELET 815 055 - 146 K/uL    MPV 10.5 9.2 - 11.8 FL    NEUTROPHILS 46 40 - 73 %    LYMPHOCYTES 41 21 - 52 %    MONOCYTES 6 3 - 10 %    EOSINOPHILS 6 (H) 0 - 5 %    BASOPHILS 1 0 - 2 %    ABS. NEUTROPHILS 3.3 1.8 - 8.0 K/UL    ABS. LYMPHOCYTES 2.9 0.9 - 3.6 K/UL    ABS. MONOCYTES 0.4 0.05 - 1.2 K/UL    ABS. EOSINOPHILS 0.4 0.0 - 0.4 K/UL    ABS. BASOPHILS 0.0 0.0 - 0.1 K/UL    DF AUTOMATED     LIPASE    Collection Time: 10/10/18  2:23 AM   Result Value Ref Range    Lipase 240 73 - 393 U/L   GLUCOSE, POC    Collection Time: 10/10/18  6:17 AM   Result Value Ref Range    Glucose (POC) 175 (H) 70 - 110 mg/dL   GLUCOSE, POC    Collection Time: 10/10/18  8:13 AM   Result Value Ref Range    Glucose (POC) 184 (H) 70 - 110 mg/dL     Additional Data Reviewed: follow up is needed with GI to discuss diagnostic testing results     Condition: Stable  Follow-up Appointments:   1. Your PCP within 7-10days. [facility]  2. Follow up with [facility] within 7 to 10 days  3. Hold statin therapy  4.  Hold BP until seen by PCP.             >30 minutes spent coordinating this discharge (review instructions/follow-up, prescriptions, preparing report for sign off)    Signed:  Becca Morris NP  10/10/2018  10:58 AM

## 2018-10-10 NOTE — DIABETES MGMT
GLYCEMIC CONTROL AND NUTRITION Assessment/Recommendations: 
Fasting lab glucose this am 202 mg/dl Noted lantus 10 units ordered two times daily. Lantus held yesterday evening related to BG of 147 mg/dl. Order stated to hold if BG is <150. Fasting BG would have been lower, if Lantus had been given. Patient normally takes Lantus 50 units twice a day. Continue corrective insulin as needed Will continue inpatient monitoring. Most recent blood glucose values: 
Results for Celestino Escalante (MRN [id number]) as of 10/10/2018 09:30 Ref. Range 10/9/2018 15:05 10/9/2018 17:49 10/10/2018 00:36 10/10/2018 06:17 10/10/2018 08:13 GLUCOSE,FAST - POC Latest Ref Range: 70 - 110 mg/dL 170 (H) 147 (H) 225 (H) 175 (H) 184 (H) Current A1C of 10.8 % is equivalent to average blood glucose of 263 mg/dl over the past 2-3 months. Current hospital diabetes medications:  
lantus 10 units two times daily Lispro corrective insulin coverage AC&HS Previous day's insulin requirements:  
 
Home diabetes medications: 
Lantus insulin 50 units twice daily Fast acting insulin 4 times daily based on blood glucose readings. Max of 15 units/meal 
Diet:   
Diabetic consistent carb Education:  _x__Refer to Diabetes Education Record  
          ____Education not indicated at this time Mayra Brunson RN CDE Pager [phone] Ext [phone]

## 2018-10-10 NOTE — PROGRESS NOTES
Transition of Care (PABLITO) Plan:  Pt has been discharged home 
 
[facility] Transportation: How is patient being transported at discharge? family When?  today Is transport scheduled? Follow-up appointment and transportation: 
 
 PCP? Pt to follow up with [place] physician Specialist? 
 
 Time/Date? Who is transporting to the follow-up appointment? Is transport for follow up appointment scheduled? Communication plan (with patient/family): Who is being called? Patient or Next of Kin? Responsible party? What number(s) is to be used? What service provider is calling for [facility] services? When are they calling? Readmission Risk? (Green/Low; Yellow/Moderate; Red/High):   Green/low

## 2018-10-10 NOTE — PROGRESS NOTES
[email or web address] 
[phone] Gastroenterology follow up-Progress note Impression: 
1. ? Idiopathic pancreatitis, ?medication related, ?pancreas divisum, US neg for CBD obstruction 2. LUQ pain, N/V improved 3. Uncontrolled DM 4. Hx cholecystitis s/p lap kerline last summer Plan: 1. Continue IV fluids, antiemetics and analgesia 2. Await MRCP results, if neg will start Gemfibrozil for elevated triglycerides 3. Await IgG4 and KENNY 4. Continue current medical management 5. Diet per primary care team 
 
Chief Complaint: Acute pancreatitis Subjective:  Pain improved, eating solid food this morning, concerned about BS. Eyes: conjunctiva normal, EOM normal  
Neck: ROM normal, supple and trachea normal  
Cardiovascular: heart normal, intact distal pulses, normal rate and regular rhythm Pulmonary/Chest Wall: breath sounds normal and effort normal  
Abdominal: appearance normal, bowel sounds normal and soft, non-acute, epigastric and LUQ tenderness Patient Active Problem List  
Diagnosis Code  Acute pancreatitis K85.90  Type 2 diabetes mellitus with hyperglycemia (HCC) E11.65  GRACIELA (acute kidney injury) ([facility] [address] N17.9  Elevated serum alkaline phosphatase level R74.8  Hyponatremia E87.1 Visit Vitals  BP (!) 169/99 (BP 1 Location: Left arm, BP Patient Position: At rest)  Pulse 81  Temp 97.4 °F (36.3 °C)  Resp 18  Wt 125.9 kg (277 lb 9.6 oz)  SpO2 96% No intake or output data in the 24 hours ending 10/10/18 0915 CBC w/Diff Lab Results Component Value Date/Time WBC 7.2 10/10/2018 02:23 AM  
 RBC 4.25 (L) 10/10/2018 02:23 AM  
 HGB 12.5 (L) 10/10/2018 02:23 AM  
 HCT 37.6 10/10/2018 02:23 AM  
 MCV 88.5 10/10/2018 02:23 AM  
 MCH 29.4 10/10/2018 02:23 AM  
 MCHC 33.2 10/10/2018 02:23 AM  
 RDW 13.6 10/10/2018 02:23 AM  
  10/10/2018 02:23 AM  
 Lab Results Component Value Date/Time  GRANS 46 10/10/2018 02:23 AM  
 LYMPH 41 10/10/2018 02:23 AM  
 EOS 6 (H) 10/10/2018 02:23 AM  
 BASOS 1 10/10/2018 02:23 AM  
  
Basic Metabolic Profile Recent Labs 10/10/18 
 8835 NA  140  
K  4.7 CL  109* CO2  24 BUN  16  
CA  7.7* MG  2.3 PHOS  2.0* Hepatic Function Lab Results Component Value Date/Time ALB 2.6 (L) 10/08/2018 09:00 PM  
 TP 7.5 10/08/2018 09:00 PM  
  (H) 10/08/2018 09:00 PM  
 Lab Results Component Value Date/Time SGOT 23 10/08/2018 09:00 PM  
  
 
 
Coags No results for input(s): PTP, INR, APTT in the last 72 hours. No lab exists for component: [facility]. [email or web address] Phone: [phone] Pager: [phone]

## 2018-10-10 NOTE — DISCHARGE INSTRUCTIONS
Pancreatitis: Care Instructions  Your Care Instructions    The pancreas is an organ behind the stomach. It makes hormones and enzymes to help your body digest food. But if these enzymes attack the pancreas, it can get inflamed. This is called pancreatitis. Most cases are caused by gallstones or by heavy alcohol use. If you take care of yourself at home, it will help you get better. It will also help you avoid more problems with your pancreas. Follow-up care is a key part of your treatment and safety. Be sure to make and go to all appointments, and call your doctor if you are having problems. It's also a good idea to know your test results and keep a list of the medicines you take. How can you care for yourself at home? · Drink clear liquids and eat bland foods until you feel better. [place] foods include rice, dry toast, and crackers. They also include bananas and applesauce. · Eat a low-fat diet until your doctor says your pancreas is healed. · Do not drink alcohol. Tell your doctor if you need help to quit. Counseling, support groups, and sometimes medicines can help you stay sober. · Be safe with medicines. Read and follow all instructions on the label. ¨ If the doctor gave you a prescription medicine for pain, take it as prescribed. ¨ If you are not taking a prescription pain medicine, ask your doctor if you can take an over-the-counter medicine. · If your doctor prescribed antibiotics, take them as directed. Do not stop taking them just because you feel better. You need to take the full course of antibiotics. · Get extra rest until you feel better. To prevent future problems with your pancreas  · Do not drink alcohol. · Tell your doctors and pharmacist that you've had pancreatitis. They can help you avoid medicines that may cause this problem again. When should you call for help? Call 911 anytime you think you may need emergency care.  For example, call if:    · You vomit blood or what looks like coffee grounds.     · Your stools are maroon or very bloody.    Call your doctor now or seek immediate medical care if:    · You have new or worse belly pain.     · Your stools are black and look like tar, or they have streaks of blood.     · You are vomiting.    Watch closely for changes in your health, and be sure to contact your doctor if:    · You do not get better as expected. Where can you learn more? Go to http://reese-tonie.info/. Enter L088 in the search box to learn more about \"Pancreatitis: Care Instructions. \"  Current as of: 2018  Content Version: 11.8  © 3641-2832 [employer]. Care instructions adapted under license by [employer] (which disclaims liability or warranty for this information). If you have questions about a medical condition or this instruction, always ask your healthcare professional. [place] [address] any warranty or liability for your use of this information. Patient armband removed and shredded  MyChart Activation    Thank you for requesting access to [employer]. Please follow the instructions below to securely access and download your online medical record. [employer] allows you to send messages to your doctor, view your test results, renew your prescriptions, schedule appointments, and more. How Do I Sign Up? 1. In your internet browser, go to [email or web address]  2. Click on the First Time User? Click Here link in the Sign In box. You will be redirect to the New Member Sign Up page. 3. Enter your [employer] Access Code exactly as it appears below. You will not need to use this code after youve completed the sign-up process. If you do not sign up before the expiration date, you must request a new code. [employer] Access Code: [email or web address]  Expires: 2019  8:29 PM (This is the date your [employer] access code will )    4.  Enter the last four digits of your Social Security Number (xxxx) and Date of Birth (mm/dd/yyyy) as indicated and click Submit. You will be taken to the next sign-up page. 5. Create a [employer] ID. This will be your [employer] login ID and cannot be changed, so think of one that is secure and easy to remember. 6. Create a [employer] password. You can change your password at any time. 7. Enter your Password Reset Question and Answer. This can be used at a later time if you forget your password. 8. Enter your e-mail address. You will receive e-mail notification when new information is available in [address] 9. Click Sign Up. You can now view and download portions of your medical record. 10. Click the Download Summary menu link to download a portable copy of your medical information. Additional Information    If you have questions, please visit the Frequently Asked Questions section of the [employer] website at [email or web address] Remember, [employer] is NOT to be used for urgent needs. For medical emergencies, dial 911. DISCHARGE SUMMARY from Nurse    PATIENT INSTRUCTIONS:    After general anesthesia or intravenous sedation, for 24 hours or while taking prescription Narcotics:  · Limit your activities  · Do not drive and operate hazardous machinery  · Do not make important personal or business decisions  · Do  not drink alcoholic beverages  · If you have not urinated within 8 hours after discharge, please contact your surgeon on call.     Report the following to your surgeon:  · Excessive pain, swelling, redness or odor of or around the surgical area  · Temperature over 100.5  · Nausea and vomiting lasting longer than 4 hours or if unable to take medications  · Any signs of decreased circulation or nerve impairment to extremity: change in color, persistent  numbness, tingling, coldness or increase pain  · Any questions    What to do at Home:  Recommended activity: Activity as tolerated,     If you experience any of the following symptoms, Shortness of Breath, Nausea, Vomiting, fever greater than 100.4, please follow up with PCP. *  Please give a list of your current medications to your Primary Care Provider. *  Please update this list whenever your medications are discontinued, doses are      changed, or new medications (including over-the-counter products) are added. *  Please carry medication information at all times in case of emergency situations. These are general instructions for a healthy lifestyle:    No smoking/ No tobacco products/ Avoid exposure to second hand smoke  Surgeon General's Warning:  Quitting smoking now greatly reduces serious risk to your health. Obesity, smoking, and sedentary lifestyle greatly increases your risk for illness    A healthy diet, regular physical exercise & weight monitoring are important for maintaining a healthy lifestyle    You may be retaining fluid if you have a history of heart failure or if you experience any of the following symptoms:  Weight gain of 3 pounds or more overnight or 5 pounds in a week, increased swelling in our hands or feet or shortness of breath while lying flat in bed. Please call your doctor as soon as you notice any of these symptoms; do not wait until your next office visit. Recognize signs and symptoms of STROKE:    F-face looks uneven    A-arms unable to move or move unevenly    S-speech slurred or non-existent    T-time-call 911 as soon as signs and symptoms begin-DO NOT go       Back to bed or wait to see if you get better-TIME IS BRAIN. Warning Signs of HEART ATTACK     Call 911 if you have these symptoms:   Chest discomfort. Most heart attacks involve discomfort in the center of the chest that lasts more than a few minutes, or that goes away and comes back. It can feel like uncomfortable pressure, squeezing, fullness, or pain.  Discomfort in other areas of the upper body.  Symptoms can include pain or discomfort in one or both arms, the back, neck, jaw, or stomach.  Shortness of breath with or without chest discomfort.  Other signs may include breaking out in a cold sweat, nausea, or lightheadedness. Don't wait more than five minutes to call 911 - MINUTES MATTER! Fast action can save your life. Calling 911 is almost always the fastest way to get lifesaving treatment. Emergency Medical Services staff can begin treatment when they arrive -- up to an hour sooner than if someone gets to the hospital by car. The discharge information has been reviewed with the patient. The patient verbalized understanding. Discharge medications reviewed with the patient and appropriate educational materials and side effects teaching were provided.   ___________________________________________________________________________________________________________________________________

## 2018-10-10 NOTE — DIABETES MGMT
NUTRITIONAL ASSESSMENT GLYCEMIC CONTROL/ PLAN OF CARE Maya Sorensen           50 y.o.           10/8/2018 1. Acute pancreatitis, unspecified complication status, unspecified pancreatitis type 2. GRACIELA (acute kidney injury) ([facility] [address] 3. Hyperglycemia INTERVENTIONS/PLAN:  
Continue inpatient monitoring and intervention ASSESSMENT:  
Pt is a 50year old male with a past medical history significant for cholecystitis, diabetes, high cholesterol, hypertension, and diabetes. Noted Lantus held yesterday, blood glucose elevated today. Pt tolerating diet. Noted plan for discharge today. Diabetes Management:  
Recent blood glucose:    
10/10/2018 00:36 10/10/2018 06:17 10/10/2018 08:13 10/10/2018 11:10  
225 (H) 175 (H) 184 (H) 223 (H) Within target range (non-ICU: <140; ICU<180): [] Yes   [x]  No 
 
Current Insulin regimen:  
lantus 10 units two times daily Lispro corrective insulin coverage AC&HS Home medication/insulin regimen:  
Lantus insulin 50 units twice daily Fast acting insulin 4 times daily based on blood glucose readings. Max of 15 units/meal 
Current A1C of 10.8 % is equivalent to average blood glucose of 263 mg/dl over the past 2-3 months. Adequate glycemic control PTA:  [] Yes  [x] No 
  
SUBJECTIVE/OBJECTIVE:  
Diet: Diabetic consistent carbohydrate Medications: [x] Reviewed Most Recent POC Glucose:  
Recent Labs 10/10/18 
 0223  10/09/18 
 0435  10/08/18 
 2100 GLU  202*  284*  401* Labs:  
Lab Results Component Value Date/Time Hemoglobin A1c 10.8 (H) 10/09/2018 04:35 AM  
 
Lab Results Component Value Date/Time  Sodium 140 10/10/2018 02:23 AM  
 Potassium 4.7 10/10/2018 02:23 AM  
 Chloride 109 (H) 10/10/2018 02:23 AM  
 CO2 24 10/10/2018 02:23 AM  
 Anion gap 7 10/10/2018 02:23 AM  
 Glucose 202 (H) 10/10/2018 02:23 AM  
 BUN 16 10/10/2018 02:23 AM  
 Creatinine 1.20 10/10/2018 02:23 AM  
 Calcium 7.7 (L) 10/10/2018 02:23 AM  
 Magnesium 2.3 10/10/2018 02:23 AM  
 Phosphorus 2.0 (L) 10/10/2018 02:23 AM  
 Albumin 2.6 (L) 10/08/2018 09:00 PM  
 
Anthropometrics: Wt Readings from Last 1 Encounters:  
10/09/18 125.9 kg (277 lb 9.6 oz) Nutrition Diagnoses: Altered nutrition related lab value related to diabetes as evidenced by Hemoglobin A1c of 10.8% Nutrition Interventions: coordination of care Goal: Blood glucose will be within target range of  mg/dL by 10/11/18 Nutrition Monitoring and Evaluation []     Monitor po intake on meal rounds 
[x]     Continue inpatient monitoring and intervention 
[]     Other: 
 
Nutrition Risk:  []   High     []  Moderate    [x]  Minimal/Uncompromised Abiel Kim RD, CDE pgr [phone]

## 2018-10-10 NOTE — ROUTINE PROCESS
Patient discharged home from unit with wife and all belongings. Discharge instructions given and signed. Vitals are stable, no distress. Transport on the way with wheel chair.

## 2018-10-10 NOTE — PROGRESS NOTES
Pt complaining of nausea, vomiting and abd pain, in bathroom, over sink. He stated that he vomited clear liquid and was very nauseated and having lots of pain. Given Zofran and morphine IV PRN as ordered. Pt resting comfortably 15 minutes after medication in bed.

## 2018-10-10 NOTE — ROUTINE PROCESS
Bedside and Verbal shift change report given to Romulo Venegas RN (oncoming nurse) by Haja Sam RN (offgoing nurse). Report included the following information SBAR, Kardex, MAR and Recent Results. SITUATION: 
Code Status: Full Code Reason for Admission: Acute pancreatitis GRACIELA (acute kidney injury) ([facility] [address] Hyperglycemia Hospital day: 1 Problem List:  
   
Hospital Problems  Never Reviewed Codes Class Noted POA * (Principal)Acute pancreatitis ICD-10-CM: K85.90 ICD-9-CM: 914.5  10/9/2018 Unknown Type 2 diabetes mellitus with hyperglycemia (HCC) ICD-10-CM: E11.65 ICD-9-CM: 250.00  10/9/2018 Unknown GRACIELA (acute kidney injury) ([facility] [address] ICD-10-CM: N17.9 ICD-9-CM: 584.9  10/9/2018 Unknown Elevated serum alkaline phosphatase level ICD-10-CM: R74.8 ICD-9-CM: 790.5  10/9/2018 Unknown Hyponatremia ICD-10-CM: E87.1 ICD-9-CM: 276.1  10/9/2018 Unknown BACKGROUND: 
 Past Medical History:  
Past Medical History:  
Diagnosis Date  Cholecystitis  Diabetes ([facility] [address]  High cholesterol  Hypertension  Neuropathy Patient taking anticoagulants yes Patient has a defibrillator: no  
 History of shots YES for example, flu, pneumonia, tetanus Isolation History NO for example, MRSA, CDiff ASSESSMENT: 
Changes in Assessment Throughout Shift: None Significant Changes in 24 hours (for example, RR/code, fall) Patient has Central Line: no Reasons if yes: N/A Patient has Mensah Cath: no Reasons if yes: N/A Mobility Issues PT 
IV Patency OR Checklist 
Pending Tests Last Vitals: 
Vitals w/ MEWS Score (last day) Date/Time MEWS Score Pulse Resp Temp BP Level of Consciousness SpO2  
 10/10/18 0415 1 80 18 97.2 °F (36.2 °C) 135/81 Alert 98 % 10/10/18 0015 1 76 18 97 °F (36.1 °C) 134/80 Alert 98 % 10/09/18 1850 1 80 20 98 °F (36.7 °C) (!)  151/95 Alert 98 % 10/09/18 1501 1 76 20 97.1 °F (36.2 °C) (!)  150/92 Alert 97 % 10/09/18 1115 1 77 20 97 °F (36.1 °C) 136/79 Alert 98 % 10/09/18 1110 -- -- -- -- -- Alert --  
 10/09/18 0758 1 83 20 97.7 °F (36.5 °C) 150/84 Alert 97 % 10/09/18 0620 -- -- -- -- -- Alert --  
 10/09/18 0320 1 93 20 98 °F (36.7 °C) 137/80 Alert 96 % 10/09/18 0219 1 97 20 97.7 °F (36.5 °C) (!)  151/91 Alert 97 % 10/09/18 0130 -- 95 14 -- 117/62 -- 97 % 10/09/18 0115 -- 95 13 -- 117/69 -- 98 % 10/09/18 0100 0 94 13 97.8 °F (36.6 °C) 157/77 Alert 93 % 10/09/18 0045 -- 97 17 -- 124/77 -- 97 % 10/09/18 0030 -- 99 19 -- 142/84 -- 99 % 10/09/18 0015 -- 95 16 -- 141/86 -- 94 % 10/09/18 0000 -- 98 23 -- 138/74 -- 96 % PAIN Pain Assessment Pain Intensity 1: 0 (10/10/18 0555) Pain Location 1: Abdomen Pain Intervention(s) 1: Medication (see MAR) Patient Stated Pain Goal: 0 Intervention effective: yes Time of last intervention: 0452 Reassessment Completed: yes Other actions taken for pain: None Last 3 Weights: 
Last 3 Recorded Weights in this Encounter 10/08/18 2035 10/09/18 1533 Weight: 117.9 kg (260 lb) 125.9 kg (277 lb 9.6 oz) Weight change: INTAKE/OUPUT Current Shift:   
  Last three shifts:   
 
RECOMMENDATIONS AND DISCHARGE PLANNING Patient needs and requests: Pain control, stop nausea/vomiting Pending tests/procedures: None Discharge plan for patient: Home Discharge planning Needs or Barriers:  
 
Estimated Discharge Date: TBD Posted on Whiteboard in Patients Room: no   
  
\"HEALS\" SAFETY CHECK A safety check occurred in the patient's room between off going nurse and oncoming nurse listed above. The safety check included the below items: 
 
H High Alert Medications Verify all high alert medication drips (heparin, PCA, etc.) E Equipment Suction is set up for ALL patients (with neela) Red plugs utilized for all equipment (IV pumps, etc.) WOWs wiped down at end of shift. Room stocked with oxygen, suction, and other unit-specific supplies A Alarms Bed alarm is set for fall risk patients Ensure chair alarm is in place and activated if patient is up in a chair L Lines Check IV for any infiltration Mensah bag is empty if patient has a Mensah Tubing and IV bags are labeled Toño Valentine Safety Room is clean, patient is clean, and equipment is clean. Hallways are clear from equipment besides carts. Fall bracelet on for fall risk patients Ensure room is clear and free of clutter Suction is set up for ALL patients (with neela) Hallways are clear from equipment besides carts. Isolation precautions followed, supplies available outside room, sign posted Issac Nogueira RN

## 2018-10-14 LAB
BACTERIA SPEC CULT: NORMAL
BACTERIA SPEC CULT: NORMAL
SERVICE CMNT-IMP: NORMAL
SERVICE CMNT-IMP: NORMAL

## 2018-10-17 ENCOUNTER — HOSPITAL ENCOUNTER (OUTPATIENT)
Dept: LAB | Age: 48
Discharge: HOME | End: 2018-10-17
Payer: OTHER GOVERNMENT

## 2018-10-17 PROCEDURE — 36415 COLL VENOUS BLD VENIPUNCTURE: CPT | Performed by: NURSE PRACTITIONER

## 2018-10-17 PROCEDURE — 82955 ASSAY OF G6PD ENZYME: CPT | Performed by: NURSE PRACTITIONER

## 2018-10-19 LAB
G6PD BLD QN: 250 U/10E12 RBC (ref 146–376)
RBC # BLD AUTO: 5.06 X10E6/UL (ref 4.14–5.8)

## 2019-05-27 ENCOUNTER — HOSPITAL ENCOUNTER (EMERGENCY)
Age: 49
Discharge: HOME | End: 2019-05-27
Attending: EMERGENCY MEDICINE
Payer: OTHER GOVERNMENT

## 2019-05-27 VITALS
SYSTOLIC BLOOD PRESSURE: 155 MMHG | HEIGHT: 72 IN | RESPIRATION RATE: 18 BRPM | OXYGEN SATURATION: 99 % | BODY MASS INDEX: 36.57 KG/M2 | HEART RATE: 92 BPM | TEMPERATURE: 98.8 F | DIASTOLIC BLOOD PRESSURE: 101 MMHG | WEIGHT: 270 LBS

## 2019-05-27 DIAGNOSIS — Z20.818 STREP THROAT EXPOSURE: ICD-10-CM

## 2019-05-27 DIAGNOSIS — J02.9 ACUTE PHARYNGITIS, UNSPECIFIED ETIOLOGY: Primary | ICD-10-CM

## 2019-05-27 PROCEDURE — 99283 EMERGENCY DEPT VISIT LOW MDM: CPT

## 2019-05-27 RX ORDER — AZITHROMYCIN 200 MG/5ML
POWDER, FOR SUSPENSION ORAL
Qty: 40 ML | Refills: 0 | Status: SHIPPED | OUTPATIENT
Start: 2019-05-27 | End: 2020-12-30

## 2019-05-27 NOTE — ED PROVIDER NOTES
EMERGENCY DEPARTMENT HISTORY AND PHYSICAL EXAM    Date: (Not on file)  Patient Name: Lee Ann New    History of Presenting Illness     Chief Complaint   Patient presents with    Sore Throat         History Provided By:patient    Chief Complaint: sore throat  Duration: 3-4 days  Timing: acute  Location: throat  Quality: burning  Severity: moderate  Modifying Factors: none  Associated Symptoms:none      Additional History (Context): Lee Ann New is a 50 y.o. male with PMH DM, htn, and high cholesterol who presents with c/o a sore throat x 3-4 days. Denies fever and chills. Pt states his daughter recently was diagnosed with strep throat. No other complaints. PCP: UNKNOWN    Current Outpatient Medications   Medication Sig Dispense Refill    insulin pump (PATIENT SUPPLIED) misc by SubCUTAneous route as needed.  insulin glargine (LANTUS U-100 INSULIN) 100 unit/mL injection 80 Units by SubCUTAneous route nightly. Past History     Past Medical History:  Past Medical History:   Diagnosis Date    Cholecystitis     Diabetes ([address]     High cholesterol     Hypertension     Neuropathy        Past Surgical History:  Past Surgical History:   Procedure Laterality Date    HX CHOLECYSTECTOMY      HX ORTHOPAEDIC      jaw surgery    HX ORTHOPAEDIC      left foot       Family History:  History reviewed. No pertinent family history. Social History:  Social History     Tobacco Use    Smoking status: Never Smoker    Smokeless tobacco: Never Used   Substance Use Topics    Alcohol use: No    Drug use: Not on file       Allergies: Allergies   Allergen Reactions    Amoxicillin Nausea and Vomiting    Gabapentin Nausea and Vomiting and Other (comments)     Urinary retention    Metformin Nausea and Vomiting         Review of Systems   Review of Systems   Constitutional: Negative. Negative for chills and fever. HENT: Positive for sore throat. Negative for congestion, ear pain and rhinorrhea.     Eyes: Negative. Negative for pain and redness. Respiratory: Negative. Negative for cough, shortness of breath, wheezing and stridor. Cardiovascular: Negative. Negative for chest pain and leg swelling. Gastrointestinal: Negative. Negative for abdominal pain, constipation, diarrhea, nausea and vomiting. Genitourinary: Negative. Negative for dysuria and frequency. Musculoskeletal: Negative. Negative for back pain and neck pain. Skin: Negative. Negative for rash and wound. Neurological: Negative. Negative for dizziness, seizures, syncope and headaches. All other systems reviewed and are negative. All Other Systems Negative  Physical Exam   There were no vitals filed for this visit. Physical Exam   Constitutional: He is oriented to person, place, and time. He appears well-developed and well-nourished. No distress. HENT:   Head: Normocephalic and atraumatic. Nose: Nose normal.   Mouth/Throat: Oropharyngeal exudate present. Oropharynx erythematous with bilateral exudates noted, airway is patent, able to clear secretions. Eyes: Conjunctivae are normal. Right eye exhibits no discharge. Left eye exhibits no discharge. No scleral icterus. Neck: Normal range of motion. Neck supple. Cardiovascular: Normal rate, regular rhythm and normal heart sounds. Exam reveals no gallop and no friction rub. No murmur heard. Pulmonary/Chest: Effort normal and breath sounds normal. No stridor. No respiratory distress. He has no wheezes. He has no rales. Musculoskeletal: Normal range of motion. Neurological: He is alert and oriented to person, place, and time. Coordination normal.   Gait is steady. Able to ambulate without difficulty. Skin: Skin is warm and dry. No rash noted. He is not diaphoretic. No erythema. Psychiatric: He has a normal mood and affect. His behavior is normal. Thought content normal.   Nursing note and vitals reviewed.              Diagnostic Study Results     Labs -   No results found for this or any previous visit (from the past 12 hour(s)). Radiologic Studies -   No orders to display     CT Results  (Last 48 hours)    None        CXR Results  (Last 48 hours)    None            Medical Decision Making   I am the first provider for this patient. I reviewed the vital signs, available nursing notes, past medical history, past surgical history, family history and social history. Vital Signs-Reviewed the patient's vital signs. Records Reviewed: Ya Guerra PA-C     Procedures:  Procedures    Provider Notes (Medical Decision Making): Impression:  Strep exposure, acute pharyngitis    Pt states he cannot take pill form of abx, he also has noted amoxicillin allergy, will plan to d/c with zithromax liquid and recommend PCP follow-up. Pt agrees. Ya Guerra PA-C     MED RECONCILIATION:  No current facility-administered medications for this encounter. Current Outpatient Medications   Medication Sig    insulin pump (PATIENT SUPPLIED) misc by SubCUTAneous route as needed.  insulin glargine (LANTUS U-100 INSULIN) 100 unit/mL injection 80 Units by SubCUTAneous route nightly. Disposition:  D/c    DISCHARGE NOTE:   Patient is stable for discharge at this time. Rx for zithromax given. Rest and follow-up with PCP this week. Return to the ED immediately for any new or worsening sx. Ya Robin PA-C 3:46 PM   Follow-up Information     Follow up With Specialties Details Why Contact Info    your primary care doctor   Schedule an appointment as soon as possible for a visit in 1 week      [id number] [facility] EMERGENCY DEPT Emergency Medicine  As needed, If symptoms worsen [address] [facility]  [phone]          Current Discharge Medication List                Diagnosis     Clinical Impression:   1. Acute pharyngitis, unspecified etiology    2.  Strep throat exposure

## 2019-05-27 NOTE — DISCHARGE INSTRUCTIONS
[employer] Activation    Thank you for requesting access to [employer]. Please follow the instructions below to securely access and download your online medical record. [employer] allows you to send messages to your doctor, view your test results, renew your prescriptions, schedule appointments, and more. How Do I Sign Up? 1. In your internet browser, go to [email or web address]  2. Click on the First Time User? Click Here link in the Sign In box. You will be redirect to the New Member Sign Up page. 3. Enter your [employer] Access Code exactly as it appears below. You will not need to use this code after youve completed the sign-up process. If you do not sign up before the expiration date, you must request a new code. [employer] Access Code: [email or web address]  Expires: 2019  3:37 PM (This is the date your [employer] access code will )    4. Enter the last four digits of your Social Security Number (xxxx) and Date of Birth (mm/dd/yyyy) as indicated and click Submit. You will be taken to the next sign-up page. 5. Create a [employer] ID. This will be your [employer] login ID and cannot be changed, so think of one that is secure and easy to remember. 6. Create a [employer] password. You can change your password at any time. 7. Enter your Password Reset Question and Answer. This can be used at a later time if you forget your password. 8. Enter your e-mail address. You will receive e-mail notification when new information is available in [address]. 9. Click Sign Up. You can now view and download portions of your medical record. 10. Click the Download Summary menu link to download a portable copy of your medical information. Additional Information    If you have questions, please visit the Frequently Asked Questions section of the [employer] website at [email or web address] Remember, [employer] is NOT to be used for urgent needs. For medical emergencies, dial 911.        Complete all medications as prescribed. Follow-up with primary care doctor in 1 week. Return to the ED immediately for any new or worsening symptoms.

## 2020-12-24 ENCOUNTER — HOSPITAL ENCOUNTER (OUTPATIENT)
Dept: LAB | Age: 50
Discharge: HOME | End: 2020-12-24
Payer: OTHER GOVERNMENT

## 2020-12-24 PROCEDURE — 87635 SARS-COV-2 COVID-19 AMP PRB: CPT

## 2020-12-25 LAB — SARS-COV-2, COV2NT: NOT DETECTED

## 2020-12-30 ENCOUNTER — ANESTHESIA EVENT (OUTPATIENT)
Dept: ENDOSCOPY | Age: 50
End: 2020-12-30
Payer: OTHER GOVERNMENT

## 2020-12-30 RX ORDER — BUMETANIDE 1 MG/1
1 TABLET ORAL 2 TIMES DAILY
COMMUNITY
End: 2022-03-03

## 2020-12-30 RX ORDER — ASCORBIC ACID, THIAMINE, RIBOFLAVIN, NIACINAMIDE, PYRIDOXINE, FOLIC ACID, COBALAMIN, BIOTIN, PANTOTHENIC ACID 100; 1.5; 1.7; 20; 10; 1; 6; 300; 1 MG/1; MG/1; MG/1; MG/1; MG/1; MG/1; UG/1; UG/1; MG/1
1 TABLET, COATED ORAL DAILY
COMMUNITY

## 2020-12-30 RX ORDER — LISINOPRIL 20 MG/1
20 TABLET ORAL DAILY
COMMUNITY

## 2020-12-30 RX ORDER — PANCRELIPASE 60000; 12000; 38000 [USP'U]/1; [USP'U]/1; [USP'U]/1
2 CAPSULE, DELAYED RELEASE PELLETS ORAL
COMMUNITY

## 2020-12-30 NOTE — DIABETES MGMT
Glycemic Control: PAT referral. Pre-op call for patient on home insulin pump  Successful patient contact at [phone] and obtained the following information:  He has an appointment for upper and lower endoscopy on 12/31/2020. Type of diabetes: type 2  Endocrinology provider: Anthony Cavanaugh  Last known A1c:  Unknown. Patient will find out on next office visit with his provider  Type of insulin pump:  Medtronic. Patient reported that he has been on insulin pump for more than one year. Type of insulin: novolog  Basal rate(s): Patient attempted get the information from his pump but he didn't know how to navigate the insulin delivery device to find the basal rate set-up. It is recommended for patient to learn by requesting assistance from [employer] customer service or from his diabetes care provider next office visit. Bolus: Wizard (anticipated carb intake and blood glucose readings)  Frequency of infusion set change: every 3 days  Does patient have any skin problem related to use of insulin pump? No.  Is patient able to recognize symptoms when blood sugar is below 70? Yes. Is patient able to state how to treat when blood sugar is below 70? Yes. Instructions given to patient:  Follow PAT instruction regarding NPO after midnight the night before procedure. Continue use of insulin pump after midnight at basal rate(s). No insulin bolus before surgery. Low blood sugar below 70 is an emergency. Follow instructions for treatment if he experience low blood sugar after midnight, but do not take any solids or dark liquids for treatment. Instead, take 4 oz of clear liquids like apple juice, 7Up, or ginger ale. Check BG within 15 minutes and repeat treatment every 15 minutes until blood sugar is above 70. Report episode of low blood sugar to the nurse and doctor upon arrival including the time of low blood sugar, and what he used to treat, and the amount.     Patient verbalized understanding of above instructions.    Charles Blood RN [facility]  Pager: [phone]

## 2020-12-31 ENCOUNTER — ANESTHESIA (OUTPATIENT)
Dept: ENDOSCOPY | Age: 50
End: 2020-12-31
Payer: OTHER GOVERNMENT

## 2020-12-31 ENCOUNTER — HOSPITAL ENCOUNTER (OUTPATIENT)
Age: 50
Setting detail: OUTPATIENT SURGERY
Discharge: HOME | End: 2020-12-31
Attending: INTERNAL MEDICINE | Admitting: INTERNAL MEDICINE
Payer: OTHER GOVERNMENT

## 2020-12-31 VITALS
HEART RATE: 90 BPM | TEMPERATURE: 97.8 F | WEIGHT: 291.8 LBS | RESPIRATION RATE: 18 BRPM | DIASTOLIC BLOOD PRESSURE: 79 MMHG | BODY MASS INDEX: 40.85 KG/M2 | OXYGEN SATURATION: 96 % | HEIGHT: 71 IN | SYSTOLIC BLOOD PRESSURE: 149 MMHG

## 2020-12-31 LAB
ANION GAP SERPL CALC-SCNC: 12 MMOL/L (ref 3–18)
BUN SERPL-MCNC: 50 MG/DL (ref 7–18)
BUN/CREAT SERPL: 7 (ref 12–20)
CALCIUM SERPL-MCNC: 7.4 MG/DL (ref 8.5–10.1)
CHLORIDE SERPL-SCNC: 103 MMOL/L (ref 100–111)
CO2 SERPL-SCNC: 25 MMOL/L (ref 21–32)
CREAT SERPL-MCNC: 7.01 MG/DL (ref 0.6–1.3)
GLUCOSE BLD STRIP.AUTO-MCNC: 105 MG/DL (ref 70–110)
GLUCOSE SERPL-MCNC: 114 MG/DL (ref 74–99)
POTASSIUM SERPL-SCNC: 3.8 MMOL/L (ref 3.5–5.5)
SODIUM SERPL-SCNC: 140 MMOL/L (ref 136–145)

## 2020-12-31 PROCEDURE — 74011000250 HC RX REV CODE- 250: Performed by: NURSE ANESTHETIST, CERTIFIED REGISTERED

## 2020-12-31 PROCEDURE — 80048 BASIC METABOLIC PNL TOTAL CA: CPT

## 2020-12-31 PROCEDURE — 88305 TISSUE EXAM BY PATHOLOGIST: CPT

## 2020-12-31 PROCEDURE — 76060000031 HC ANESTHESIA FIRST 0.5 HR: Performed by: INTERNAL MEDICINE

## 2020-12-31 PROCEDURE — 77030019988 HC FCPS ENDOSC DISP BSC -B: Performed by: INTERNAL MEDICINE

## 2020-12-31 PROCEDURE — 74011250636 HC RX REV CODE- 250/636: Performed by: ANESTHESIOLOGY

## 2020-12-31 PROCEDURE — 76040000019: Performed by: INTERNAL MEDICINE

## 2020-12-31 PROCEDURE — 2709999900 HC NON-CHARGEABLE SUPPLY: Performed by: INTERNAL MEDICINE

## 2020-12-31 PROCEDURE — 74011250636 HC RX REV CODE- 250/636: Performed by: NURSE ANESTHETIST, CERTIFIED REGISTERED

## 2020-12-31 PROCEDURE — 77030013992 HC SNR POLYP ENDOSC BSC -B: Performed by: INTERNAL MEDICINE

## 2020-12-31 PROCEDURE — 82962 GLUCOSE BLOOD TEST: CPT

## 2020-12-31 PROCEDURE — 77030008565 HC TBNG SUC IRR ERBE -B: Performed by: INTERNAL MEDICINE

## 2020-12-31 PROCEDURE — 00813 ANES UPR LWR GI NDSC PX: CPT | Performed by: ANESTHESIOLOGY

## 2020-12-31 PROCEDURE — 77030021593 HC FCPS BIOP ENDOSC BSC -A: Performed by: INTERNAL MEDICINE

## 2020-12-31 RX ORDER — SODIUM CHLORIDE 9 MG/ML
500 INJECTION, SOLUTION INTRAVENOUS CONTINUOUS
Status: CANCELLED | OUTPATIENT
Start: 2020-12-31

## 2020-12-31 RX ORDER — SODIUM CHLORIDE 9 MG/ML
25 INJECTION, SOLUTION INTRAVENOUS CONTINUOUS
Status: DISCONTINUED | OUTPATIENT
Start: 2020-12-31 | End: 2020-12-31 | Stop reason: HOSPADM

## 2020-12-31 RX ORDER — FAMOTIDINE 20 MG/1
20 TABLET, FILM COATED ORAL ONCE
Status: DISCONTINUED | OUTPATIENT
Start: 2020-12-31 | End: 2020-12-31

## 2020-12-31 RX ORDER — SODIUM CHLORIDE 0.9 % (FLUSH) 0.9 %
5-40 SYRINGE (ML) INJECTION EVERY 8 HOURS
Status: DISCONTINUED | OUTPATIENT
Start: 2020-12-31 | End: 2020-12-31 | Stop reason: HOSPADM

## 2020-12-31 RX ORDER — INSULIN LISPRO 100 [IU]/ML
INJECTION, SOLUTION INTRAVENOUS; SUBCUTANEOUS ONCE
Status: DISCONTINUED | OUTPATIENT
Start: 2020-12-31 | End: 2020-12-31 | Stop reason: HOSPADM

## 2020-12-31 RX ORDER — PROPOFOL 10 MG/ML
INJECTION, EMULSION INTRAVENOUS AS NEEDED
Status: DISCONTINUED | OUTPATIENT
Start: 2020-12-31 | End: 2020-12-31 | Stop reason: HOSPADM

## 2020-12-31 RX ORDER — LIDOCAINE HYDROCHLORIDE 20 MG/ML
INJECTION, SOLUTION EPIDURAL; INFILTRATION; INTRACAUDAL; PERINEURAL AS NEEDED
Status: DISCONTINUED | OUTPATIENT
Start: 2020-12-31 | End: 2020-12-31 | Stop reason: HOSPADM

## 2020-12-31 RX ORDER — SODIUM CHLORIDE, SODIUM LACTATE, POTASSIUM CHLORIDE, CALCIUM CHLORIDE 600; 310; 30; 20 MG/100ML; MG/100ML; MG/100ML; MG/100ML
75 INJECTION, SOLUTION INTRAVENOUS CONTINUOUS
Status: DISCONTINUED | OUTPATIENT
Start: 2020-12-31 | End: 2020-12-31

## 2020-12-31 RX ORDER — SODIUM CHLORIDE 0.9 % (FLUSH) 0.9 %
5-40 SYRINGE (ML) INJECTION AS NEEDED
Status: DISCONTINUED | OUTPATIENT
Start: 2020-12-31 | End: 2020-12-31 | Stop reason: HOSPADM

## 2020-12-31 RX ADMIN — PROPOFOL 50 MG: 10 INJECTION, EMULSION INTRAVENOUS at 10:23

## 2020-12-31 RX ADMIN — LIDOCAINE HYDROCHLORIDE 50 MG: 20 INJECTION, SOLUTION EPIDURAL; INFILTRATION; INTRACAUDAL; PERINEURAL at 10:17

## 2020-12-31 RX ADMIN — PROPOFOL 40 MG: 10 INJECTION, EMULSION INTRAVENOUS at 10:17

## 2020-12-31 RX ADMIN — SODIUM CHLORIDE 25 ML/HR: 900 INJECTION, SOLUTION INTRAVENOUS at 09:48

## 2020-12-31 RX ADMIN — PROPOFOL 50 MG: 10 INJECTION, EMULSION INTRAVENOUS at 10:21

## 2020-12-31 RX ADMIN — LIDOCAINE HYDROCHLORIDE 100 MG: 20 INJECTION, SOLUTION EPIDURAL; INFILTRATION; INTRACAUDAL; PERINEURAL at 10:13

## 2020-12-31 RX ADMIN — PROPOFOL 60 MG: 10 INJECTION, EMULSION INTRAVENOUS at 10:13

## 2020-12-31 RX ADMIN — PROPOFOL 50 MG: 10 INJECTION, EMULSION INTRAVENOUS at 10:19

## 2020-12-31 RX ADMIN — PROPOFOL 50 MG: 10 INJECTION, EMULSION INTRAVENOUS at 10:28

## 2020-12-31 RX ADMIN — SODIUM CHLORIDE: 900 INJECTION, SOLUTION INTRAVENOUS at 10:08

## 2020-12-31 RX ADMIN — PROPOFOL 50 MG: 10 INJECTION, EMULSION INTRAVENOUS at 10:25

## 2020-12-31 NOTE — H&P
[facility]   [email or web address]      Impression:   1.n/v dysphagia   2. crcs      Plan:     1. egd dil colo mac al risks benefits and alt discussed       Chief Complaint: n/v dyspahgia crcs      HPI:  Bridgett Torrez is a 48 y.o. male who is being seen on consult for crcs n/v dysphagia .     PMH:   Past Medical History:   Diagnosis Date    Cholecystitis     Diabetes ([facility] [address]     High cholesterol     Hypertension     Neuropathy        PSH:   Past Surgical History:   Procedure Laterality Date    HX CHOLECYSTECTOMY      HX ORTHOPAEDIC      jaw surgery    HX ORTHOPAEDIC      left foot       Social HX:   Social History     Socioeconomic History    Marital status:      Spouse name: Not on file    Number of children: Not on file    Years of education: Not on file    Highest education level: Not on file   Occupational History    Not on file   Social Needs    Financial resource strain: Not on file    Food insecurity     Worry: Not on file     Inability: Not on file    Transportation needs     Medical: Not on file     Non-medical: Not on file   Tobacco Use    Smoking status: Never Smoker    Smokeless tobacco: Never Used   Substance and Sexual Activity    Alcohol use: No    Drug use: Never    Sexual activity: Not on file   Lifestyle    Physical activity     Days per week: Not on file     Minutes per session: Not on file    Stress: Not on file   Relationships    Social connections     Talks on phone: Not on file     Gets together: Not on file     Attends Voodoo service: Not on file     Active member of club or organization: Not on file     Attends meetings of clubs or organizations: Not on file     Relationship status: Not on file    Intimate partner violence     Fear of current or ex partner: Not on file     Emotionally abused: Not on file     Physically abused: Not on file     Forced sexual activity: Not on file   Other Topics Concern    Not on file   Social History Narrative    Not on file       FHX:   No family history on file. Allergy:   Allergies   Allergen Reactions    Amoxicillin Nausea and Vomiting    Gabapentin Nausea and Vomiting and Other (comments)     Urinary retention    Metformin Nausea and Vomiting       Home Medications:     No medications prior to admission. Review of Systems:     Constitutional: No fevers, chills, weight loss, fatigue. Skin: No rashes, pruritis, jaundice, ulcerations, erythema. HENT: No headaches, nosebleeds, sinus pressure, rhinorrhea, sore throat. Eyes: No visual changes, blurred vision, eye pain, photophobia, jaundice. Cardiovascular: No chest pain, heart palpitations. Respiratory: No cough, SOB, wheezing, chest discomfort, orthopnea. Gastrointestinal: N/v dysphagia    Genitourinary: No dysuria, bleeding, discharge, pyuria. Musculoskeletal: No weakness, arthralgias, wasting. Endo: No sweats. Heme: No bruising, easy bleeding. Allergies: As noted. Neurological: Cranial nerves intact. Alert and oriented. Gait not assessed. Psychiatric:  No anxiety, depression, hallucinations. Visit Vitals  Ht 5' 11\" (1.803 m)   Wt 129.3 kg (285 lb)   BMI 39.75 kg/m²       Physical Assessment:     constitutional: appearance: well developed, well nourished, normal habitus, no deformities, in no acute distress. skin: inspection: no rashes, ulcers, icterus or other lesions; no clubbing or telangiectasias. palpation: no induration or subcutaneos nodules. eyes: inspection: normal conjunctivae and lids; no jaundice pupils: symmetrical, normoreactive to light, normal accommodation and size. ENMT: mouth: normal oral mucosa,lips and gums; good dentition. oropharynx: normal tongue, hard and soft palate; posterior pharynx without erythema, exudate or lesions. neck: no masses organomegaly or tenderness.    respiratory: effort: normal chest excursion; no intercostal retraction or accessory muscle use. cardiovascular: abdominal aorta: normal size and position; no bruits. palpation: PMI of normal size and position; normal rhythm; no thrill or murmurs. abdominal: abdomen: normal consistency; no tenderness or masses. hernias: no hernias appreciated. liver: normal size and consistency. spleen: not palpable. rectal: hemoccult/guaiac: not performed. musculoskeletal: no deformities or muscle wasting   lymphatic: axilae: not palpable. groin: not palpable. neck: within normal limits. other: not palpable. neurologic: cranial nerves: II-XII normal.   psychiatric: judgement/insight: within normal limits. memory: within normal limits for recent and remote events. mood and affect: no evidence of depression, anxiety or agitation. orientation: oriented to time, space and person. Basic Metabolic Profile   No results for input(s): NA, K, CL, CO2, BUN, GLU, CA, MG, PHOS in the last 72 hours. No lab exists for component: CREAT      CBC w/Diff    No results for input(s): WBC, RBC, HGB, HCT, MCV, MCH, MCHC, RDW, PLT, HGBEXT, HCTEXT, PLTEXT in the last 72 hours. No lab exists for component: MPV No results for input(s): GRANS, LYMPH, EOS, PRO, MYELO, METAS, BLAST in the last 72 hours. No lab exists for component: MONO, BASO     Hepatic Function   No results for input(s): ALB, TP, TBILI, AP, AML, LPSE in the last 72 hours. No lab exists for component: DBILI, GPT, SGOT       Zoey Gomez MD, M.D. [facility], [address] [place] [address]  [email or web address]

## 2020-12-31 NOTE — DISCHARGE INSTRUCTIONS
Patient Education        Learning About Colonoscopy  What is a colonoscopy? A colonoscopy is a test (also called a procedure) that lets a doctor look inside your large intestine. The doctor uses a thin, lighted tube called a colonoscope. The doctor uses it to look for small growths called polyps, colon or rectal cancer (colorectal cancer), or other problems like bleeding. During the procedure, the doctor can take samples of tissue. The samples can then be checked for cancer or other conditions. The doctor can also take out polyps. How is a colonoscopy done? This procedure is done in a doctor's office or a clinic or hospital. You will get medicine to help you relax and not feel pain. Some people find that they don't remember having the test because of the medicine. The doctor gently moves the colonoscope, or scope, through the colon. The scope is also a small video camera. It lets the doctor see the colon and take pictures. How do you prepare for the procedure? You need to clean out your colon before the procedure so the doctor can see all of your colon. This process may start a day or two before the test. This depends on which \"colon prep\" your doctor recommends. To clean your colon, you stop eating solid foods and drink only clear liquids. You can have water, tea, coffee, clear juices, clear broths, flavored ice pops, and gelatin (such as Jell-O). Do not drink anything red or purple. The day or night before the procedure, you drink a large amount of a special liquid. This causes loose, frequent stools. You will go to the bathroom a lot. It's very important to drink all of the liquid. If you have problems drinking it, call your doctor. Some people don't go to work or do their usual activities on the day of the prep. Arrange to have someone take you home after the test.  What can you expect after a colonoscopy? Your doctor will tell you when you can eat and do your usual activities.   Drink a lot of fluid after the test to replace the fluids you may have lost during the colon prep. But don't drink alcohol. Your doctor will talk to you about when you'll need your next colonoscopy. The results of your test and your risk for colorectal cancer will help your doctor decide how often you need to be checked. After the test, you may be bloated or have gas pains. You may need to pass gas. If a biopsy was done or a polyp was removed, you may have streaks of blood in your stool (feces) for a few days. If polyps were taken out, your doctor may tell you to avoid taking aspirin and nonsteroidal anti-inflammatory drugs (NSAIDs) for 7 to 14 days. Problems such as heavy rectal bleeding may not occur until several weeks after the test. This isn't common. But it can happen after polyps are removed. Follow-up care is a key part of your treatment and safety. Be sure to make and go to all appointments, and call your doctor if you are having problems. It's also a good idea to know your test results and keep a list of the medicines you take. Where can you learn more? Go to http://www.gray.com/  Enter Z368 in the search box to learn more about \"Learning About Colonoscopy. \"  Current as of: April 29, 2020               Content Version: 12.6  © 9316-9737 [employer]. Care instructions adapted under license by [employer] (which disclaims liability or warranty for this information). If you have questions about a medical condition or this instruction, always ask your healthcare professional. [place] [address] any warranty or liability for your use of this information. Patient Education        Upper GI Endoscopy: What to Expect at [address] had an upper GI endoscopy. Your doctor used a thin, lighted tube that bends to look at the inside of your esophagus, your stomach, and the first part of the small intestine, called the duodenum.   After you have an endoscopy, you will stay at the hospital or clinic for 1 to 2 hours. This will allow the medicine to wear off. You will be able to go home after your doctor or nurse checks to make sure that you're not having any problems. You may have to stay overnight if you had treatment during the test. You may have a sore throat for a day or two after the test.  This care sheet gives you a general idea about what to expect after the test.  How can you care for yourself at home? Activity   · Rest as much as you need to after you go home. · You should be able to go back to your usual activities the day after the test.  Diet   · Follow your doctor's directions for eating after the test.  · Drink plenty of fluids (unless your doctor has told you not to). Medications   · If you have a sore throat the day after the test, use an over-the-counter spray to numb your throat. Follow-up care is a key part of your treatment and safety. Be sure to make and go to all appointments, and call your doctor if you are having problems. It's also a good idea to know your test results and keep a list of the medicines you take. When should you call for help? Call 911 anytime you think you may need emergency care. For example, call if:    · You passed out (lost consciousness).     · You have trouble breathing.     · You pass maroon or bloody stools. Call your doctor now or seek immediate medical care if:    · You have pain that does not get better after your take pain medicine.     · You have new or worse belly pain.     · You have blood in your stools.     · You are sick to your stomach and cannot keep fluids down.     · You have a fever.     · You cannot pass stools or gas. Watch closely for changes in your health, and be sure to contact your doctor if:    · Your throat still hurts after a day or two.     · You do not get better as expected. Where can you learn more?   Go to http://www.gray.com/  Enter J454 in the search box to learn more about \"Upper GI Endoscopy: What to Expect at Home. \"  Current as of: April 15, 2020               Content Version: 12.6  © 1062-0321 [employer]. Care instructions adapted under license by [employer] (which disclaims liability or warranty for this information). If you have questions about a medical condition or this instruction, always ask your healthcare professional. [place] [address] any warranty or liability for your use of this information.

## 2020-12-31 NOTE — ANESTHESIA PREPROCEDURE EVALUATION
Relevant Problems   No relevant active problems       Anesthetic History   No history of anesthetic complications            Review of Systems / Medical History  Patient summary reviewed and pertinent labs reviewed    Pulmonary  Within defined limits                 Neuro/Psych   Within defined limits           Cardiovascular    Hypertension          Hyperlipidemia      Comments: Dialysis 2 days ago   GI/Hepatic/Renal         Renal disease: ESRD and dialysis  Liver disease     Endo/Other    Diabetes: type 2, using insulin    Morbid obesity and anemia     Other Findings              Physical Exam    Airway  Mallampati: II  TM Distance: > 6 cm  Neck ROM: normal range of motion   Mouth opening: Normal     Cardiovascular    Rhythm: regular  Rate: normal         Dental    Dentition: Poor dentition     Pulmonary                Comments: Non labored Abdominal  GI exam deferred       Other Findings            Anesthetic Plan    ASA: 3  Anesthesia type: MAC            Anesthetic plan and risks discussed with: Patient

## 2020-12-31 NOTE — ANESTHESIA POSTPROCEDURE EVALUATION
Procedure(s):  UPPER ENDOSCOPY with bx's  COLONOSCOPY. MAC    Anesthesia Post Evaluation      Multimodal analgesia: multimodal analgesia used between 6 hours prior to anesthesia start to PACU discharge  Patient location during evaluation: PACU  Patient participation: complete - patient participated  Level of consciousness: awake and alert  Pain management: adequate  Airway patency: patent  Anesthetic complications: no  Cardiovascular status: acceptable  Respiratory status: acceptable  Hydration status: acceptable  Post anesthesia nausea and vomiting:  none      INITIAL Post-op Vital signs:   Vitals Value Taken Time   /62 12/31/20 1056   Temp 36.9 °C (98.4 °F) 12/31/20 1040   Pulse 92 12/31/20 1102   Resp 20 12/31/20 1102   SpO2 95 % 12/31/20 1102   Vitals shown include unvalidated device data.

## 2021-06-29 RX ORDER — PROMETHAZINE HYDROCHLORIDE 12.5 MG/1
SUPPOSITORY RECTAL
COMMUNITY
End: 2021-12-27

## 2021-07-06 ENCOUNTER — ANESTHESIA EVENT (OUTPATIENT)
Dept: ENDOSCOPY | Age: 51
End: 2021-07-06
Payer: MEDICARE

## 2021-07-07 ENCOUNTER — HOSPITAL ENCOUNTER (OUTPATIENT)
Age: 51
Setting detail: OUTPATIENT SURGERY
Discharge: HOME | End: 2021-07-07
Attending: INTERNAL MEDICINE | Admitting: INTERNAL MEDICINE
Payer: MEDICARE

## 2021-07-07 ENCOUNTER — ANESTHESIA (OUTPATIENT)
Dept: ENDOSCOPY | Age: 51
End: 2021-07-07
Payer: MEDICARE

## 2021-07-07 VITALS
DIASTOLIC BLOOD PRESSURE: 93 MMHG | OXYGEN SATURATION: 100 % | WEIGHT: 268 LBS | HEIGHT: 71 IN | HEART RATE: 86 BPM | RESPIRATION RATE: 17 BRPM | TEMPERATURE: 98.4 F | BODY MASS INDEX: 37.52 KG/M2 | SYSTOLIC BLOOD PRESSURE: 172 MMHG

## 2021-07-07 LAB
ANION GAP SERPL CALC-SCNC: 4 MMOL/L (ref 3–18)
BUN SERPL-MCNC: 33 MG/DL (ref 7–18)
BUN/CREAT SERPL: 5 (ref 12–20)
CALCIUM SERPL-MCNC: 8.8 MG/DL (ref 8.5–10.1)
CHLORIDE SERPL-SCNC: 96 MMOL/L (ref 100–111)
CO2 SERPL-SCNC: 27 MMOL/L (ref 21–32)
CREAT SERPL-MCNC: 6.53 MG/DL (ref 0.6–1.3)
ERYTHROCYTE [DISTWIDTH] IN BLOOD BY AUTOMATED COUNT: 16.3 % (ref 11.6–14.5)
GLUCOSE BLD STRIP.AUTO-MCNC: 211 MG/DL (ref 70–110)
GLUCOSE BLD STRIP.AUTO-MCNC: 229 MG/DL (ref 70–110)
GLUCOSE SERPL-MCNC: 219 MG/DL (ref 74–99)
HCT VFR BLD AUTO: 44.8 % (ref 36–48)
HGB BLD-MCNC: 14.2 G/DL (ref 13–16)
INR PPP: 1 (ref 0.8–1.2)
MCH RBC QN AUTO: 29.2 PG (ref 24–34)
MCHC RBC AUTO-ENTMCNC: 31.7 G/DL (ref 31–37)
MCV RBC AUTO: 92 FL (ref 74–97)
PLATELET # BLD AUTO: 180 K/UL (ref 135–420)
PMV BLD AUTO: 10.2 FL (ref 9.2–11.8)
POTASSIUM SERPL-SCNC: 5.1 MMOL/L (ref 3.5–5.5)
PROTHROMBIN TIME: 13.4 SEC (ref 11.5–15.2)
RBC # BLD AUTO: 4.87 M/UL (ref 4.35–5.65)
SODIUM SERPL-SCNC: 127 MMOL/L (ref 136–145)
WBC # BLD AUTO: 8.7 K/UL (ref 4.6–13.2)

## 2021-07-07 PROCEDURE — 74011636637 HC RX REV CODE- 636/637: Performed by: NURSE ANESTHETIST, CERTIFIED REGISTERED

## 2021-07-07 PROCEDURE — 82962 GLUCOSE BLOOD TEST: CPT

## 2021-07-07 PROCEDURE — 00731 ANES UPR GI NDSC PX NOS: CPT | Performed by: ANESTHESIOLOGY

## 2021-07-07 PROCEDURE — 77030019988 HC FCPS ENDOSC DISP BSC -B: Performed by: INTERNAL MEDICINE

## 2021-07-07 PROCEDURE — 74011250636 HC RX REV CODE- 250/636: Performed by: NURSE ANESTHETIST, CERTIFIED REGISTERED

## 2021-07-07 PROCEDURE — 74011000250 HC RX REV CODE- 250: Performed by: NURSE ANESTHETIST, CERTIFIED REGISTERED

## 2021-07-07 PROCEDURE — 76040000019: Performed by: INTERNAL MEDICINE

## 2021-07-07 PROCEDURE — 74011250636 HC RX REV CODE- 250/636: Performed by: ANESTHESIOLOGY

## 2021-07-07 PROCEDURE — 00731 ANES UPR GI NDSC PX NOS: CPT | Performed by: NURSE ANESTHETIST, CERTIFIED REGISTERED

## 2021-07-07 PROCEDURE — 80048 BASIC METABOLIC PNL TOTAL CA: CPT

## 2021-07-07 PROCEDURE — 76060000031 HC ANESTHESIA FIRST 0.5 HR: Performed by: INTERNAL MEDICINE

## 2021-07-07 PROCEDURE — 2709999900 HC NON-CHARGEABLE SUPPLY: Performed by: INTERNAL MEDICINE

## 2021-07-07 PROCEDURE — 88305 TISSUE EXAM BY PATHOLOGIST: CPT

## 2021-07-07 PROCEDURE — 74011250637 HC RX REV CODE- 250/637: Performed by: INTERNAL MEDICINE

## 2021-07-07 PROCEDURE — 85610 PROTHROMBIN TIME: CPT

## 2021-07-07 PROCEDURE — 85027 COMPLETE CBC AUTOMATED: CPT

## 2021-07-07 PROCEDURE — 77030008565 HC TBNG SUC IRR ERBE -B: Performed by: INTERNAL MEDICINE

## 2021-07-07 RX ORDER — ONDANSETRON 2 MG/ML
4 INJECTION INTRAMUSCULAR; INTRAVENOUS
Status: COMPLETED | OUTPATIENT
Start: 2021-07-07 | End: 2021-07-07

## 2021-07-07 RX ORDER — INSULIN LISPRO 100 [IU]/ML
INJECTION, SOLUTION INTRAVENOUS; SUBCUTANEOUS ONCE
Status: DISCONTINUED | OUTPATIENT
Start: 2021-07-07 | End: 2021-07-07 | Stop reason: HOSPADM

## 2021-07-07 RX ORDER — DEXTROSE MONOHYDRATE AND SODIUM CHLORIDE 5; .225 G/100ML; G/100ML
25 INJECTION, SOLUTION INTRAVENOUS CONTINUOUS
Status: DISCONTINUED | OUTPATIENT
Start: 2021-07-07 | End: 2021-07-07 | Stop reason: HOSPADM

## 2021-07-07 RX ORDER — DEXTROSE 50 % IN WATER (D50W) INTRAVENOUS SYRINGE
25-50 AS NEEDED
Status: DISCONTINUED | OUTPATIENT
Start: 2021-07-07 | End: 2021-07-07 | Stop reason: HOSPADM

## 2021-07-07 RX ORDER — SODIUM CHLORIDE, SODIUM LACTATE, POTASSIUM CHLORIDE, CALCIUM CHLORIDE 600; 310; 30; 20 MG/100ML; MG/100ML; MG/100ML; MG/100ML
50 INJECTION, SOLUTION INTRAVENOUS CONTINUOUS
Status: DISCONTINUED | OUTPATIENT
Start: 2021-07-07 | End: 2021-07-07 | Stop reason: HOSPADM

## 2021-07-07 RX ORDER — HYDRALAZINE HYDROCHLORIDE 20 MG/ML
INJECTION INTRAMUSCULAR; INTRAVENOUS
Status: DISCONTINUED
Start: 2021-07-07 | End: 2021-07-07 | Stop reason: HOSPADM

## 2021-07-07 RX ORDER — INSULIN LISPRO 100 [IU]/ML
INJECTION, SOLUTION INTRAVENOUS; SUBCUTANEOUS ONCE
Status: COMPLETED | OUTPATIENT
Start: 2021-07-07 | End: 2021-07-07

## 2021-07-07 RX ORDER — ONDANSETRON 2 MG/ML
4 INJECTION INTRAMUSCULAR; INTRAVENOUS
Status: DISCONTINUED | OUTPATIENT
Start: 2021-07-07 | End: 2021-07-07 | Stop reason: HOSPADM

## 2021-07-07 RX ORDER — LIDOCAINE HYDROCHLORIDE 10 MG/ML
0.1 INJECTION, SOLUTION EPIDURAL; INFILTRATION; INTRACAUDAL; PERINEURAL AS NEEDED
Status: DISCONTINUED | OUTPATIENT
Start: 2021-07-07 | End: 2021-07-07 | Stop reason: HOSPADM

## 2021-07-07 RX ORDER — DEXTROMETHORPHAN/PSEUDOEPHED 2.5-7.5/.8
DROPS ORAL AS NEEDED
Status: DISCONTINUED | OUTPATIENT
Start: 2021-07-07 | End: 2021-07-07 | Stop reason: HOSPADM

## 2021-07-07 RX ORDER — SODIUM CHLORIDE 0.9 % (FLUSH) 0.9 %
5-40 SYRINGE (ML) INJECTION EVERY 8 HOURS
Status: DISCONTINUED | OUTPATIENT
Start: 2021-07-07 | End: 2021-07-07 | Stop reason: HOSPADM

## 2021-07-07 RX ORDER — HYDRALAZINE HYDROCHLORIDE 20 MG/ML
5 INJECTION INTRAMUSCULAR; INTRAVENOUS ONCE
Status: COMPLETED | OUTPATIENT
Start: 2021-07-07 | End: 2021-07-07

## 2021-07-07 RX ORDER — PROMETHAZINE HYDROCHLORIDE 25 MG/ML
12.5 INJECTION, SOLUTION INTRAMUSCULAR; INTRAVENOUS AS NEEDED
Status: DISCONTINUED | OUTPATIENT
Start: 2021-07-07 | End: 2021-07-07 | Stop reason: HOSPADM

## 2021-07-07 RX ORDER — LIDOCAINE HYDROCHLORIDE 20 MG/ML
INJECTION, SOLUTION EPIDURAL; INFILTRATION; INTRACAUDAL; PERINEURAL AS NEEDED
Status: DISCONTINUED | OUTPATIENT
Start: 2021-07-07 | End: 2021-07-07 | Stop reason: HOSPADM

## 2021-07-07 RX ORDER — SODIUM CHLORIDE 0.9 % (FLUSH) 0.9 %
5-40 SYRINGE (ML) INJECTION AS NEEDED
Status: DISCONTINUED | OUTPATIENT
Start: 2021-07-07 | End: 2021-07-07 | Stop reason: HOSPADM

## 2021-07-07 RX ORDER — PROPOFOL 10 MG/ML
INJECTION, EMULSION INTRAVENOUS AS NEEDED
Status: DISCONTINUED | OUTPATIENT
Start: 2021-07-07 | End: 2021-07-07 | Stop reason: HOSPADM

## 2021-07-07 RX ORDER — MAGNESIUM SULFATE 100 %
4 CRYSTALS MISCELLANEOUS AS NEEDED
Status: DISCONTINUED | OUTPATIENT
Start: 2021-07-07 | End: 2021-07-07 | Stop reason: HOSPADM

## 2021-07-07 RX ADMIN — PROMETHAZINE HYDROCHLORIDE 12.5 MG: 25 INJECTION INTRAMUSCULAR; INTRAVENOUS at 08:30

## 2021-07-07 RX ADMIN — PROPOFOL 40 MG: 10 INJECTION, EMULSION INTRAVENOUS at 09:00

## 2021-07-07 RX ADMIN — PROPOFOL 40 MG: 10 INJECTION, EMULSION INTRAVENOUS at 09:03

## 2021-07-07 RX ADMIN — ONDANSETRON 4 MG: 2 INJECTION INTRAMUSCULAR; INTRAVENOUS at 08:14

## 2021-07-07 RX ADMIN — PROPOFOL 40 MG: 10 INJECTION, EMULSION INTRAVENOUS at 08:57

## 2021-07-07 RX ADMIN — ONDANSETRON 4 MG: 2 INJECTION INTRAMUSCULAR; INTRAVENOUS at 10:08

## 2021-07-07 RX ADMIN — FAMOTIDINE 20 MG: 10 INJECTION INTRAVENOUS at 08:05

## 2021-07-07 RX ADMIN — DEXTROSE AND SODIUM CHLORIDE 25 ML/HR: 5; 200 INJECTION, SOLUTION INTRAVENOUS at 08:05

## 2021-07-07 RX ADMIN — LIDOCAINE HYDROCHLORIDE 100 MG: 20 INJECTION, SOLUTION EPIDURAL; INFILTRATION; INTRACAUDAL; PERINEURAL at 09:03

## 2021-07-07 RX ADMIN — HYDRALAZINE HYDROCHLORIDE 5 MG: 20 INJECTION INTRAMUSCULAR; INTRAVENOUS at 10:49

## 2021-07-07 RX ADMIN — PROPOFOL 80 MG: 10 INJECTION, EMULSION INTRAVENOUS at 08:55

## 2021-07-07 RX ADMIN — INSULIN LISPRO 6 UNITS: 100 INJECTION, SOLUTION INTRAVENOUS; SUBCUTANEOUS at 08:10

## 2021-07-07 NOTE — PERIOP NOTES
Patient in Phase 2. Alert and oriented x4.     1005- Complaining of being irritated due to nausea. Zofran ordered  By HOUSTON Reis and Administered. 1015.-Brent North on the unit and assessed patient. New nausea med sent to patient home pharmacy [facility]. 1043- Call out to Dr Fransisca Carreon regarding B/P Update. ordered hydralazine 5 mg IM. 1049 Hydralazine administered. 12- Dr. Hi Gonzalez updated on new B/P results post hydralazine. Patient is back to base line 172/93. Dr. Hi Gonzalez gave ok to discharge home. 1110-D/c of unit.

## 2021-07-07 NOTE — ANESTHESIA PREPROCEDURE EVALUATION
Relevant Problems   RENAL FAILURE   (+) GRACIELA (acute kidney injury) ([facility] [address]      ENDOCRINE   (+) Type 2 diabetes mellitus with hyperglycemia (HCC)       Anesthetic History     PONV          Review of Systems / Medical History  Patient summary reviewed and pertinent labs reviewed    Pulmonary                   Neuro/Psych              Cardiovascular    Hypertension                   GI/Hepatic/Renal         Renal disease: ESRD and dialysis       Endo/Other    Diabetes: type 2    Anemia     Other Findings              Physical Exam    Airway  Mallampati: III  TM Distance: 4 - 6 cm  Neck ROM: decreased range of motion   Mouth opening: Diminished (comment)     Cardiovascular    Rhythm: regular  Rate: normal         Dental    Dentition: Poor dentition     Pulmonary  Breath sounds clear to auscultation               Abdominal         Other Findings            Anesthetic Plan    ASA: 4  Anesthesia type: MAC            Anesthetic plan and risks discussed with: Patient

## 2021-07-07 NOTE — ANESTHESIA POSTPROCEDURE EVALUATION
Procedure(s):  UPPER ENDOSCOPY/ bx's. MAC    Anesthesia Post Evaluation      Multimodal analgesia: multimodal analgesia used between 6 hours prior to anesthesia start to PACU discharge  Patient location during evaluation: bedside  Patient participation: complete - patient participated  Level of consciousness: awake  Pain management: adequate  Airway patency: patent  Anesthetic complications: no  Cardiovascular status: stable  Respiratory status: acceptable  Hydration status: acceptable  Post anesthesia nausea and vomiting:  controlled      INITIAL Post-op Vital signs:   Vitals Value Taken Time   /69 07/07/21 0928   Temp 36.9 °C (98.4 °F) 07/07/21 0913   Pulse 78 07/07/21 0930   Resp 20 07/07/21 0930   SpO2 99 % 07/07/21 0930   Vitals shown include unvalidated device data.

## 2021-07-07 NOTE — PROGRESS NOTES
[email or web address]  [place], [address]      Brief Procedure Note    Radha Marie  1970  [id number]    Date of Procedure: 2021    Preoperative diagnosis: Body mass index 30+ - obesity:   278.00 - E66.9  Elevated blood pressure:   796.2 - R03.0  Diabetes mellitus:   250.00 - E11.9  Epigastric pain:   789.06 - R10.13  Nausea and vomitin.01 - R11.2  Pancreatitis:  577.0 - K85.90  Hypertriglyceridemia:  272.1 - E78.1  Dysphagia, unspecified:  787.20 - R13.10  Steatosis of liver:   571.8 - K76.0  Elevated liver enzymes level:   R74.8  Colon cancer Screening:   V76.51 - Z12.11    Postoperative diagnosis: EGD: duodenal bx's R/out celiac disease, gastric bx's autrum and body r/out H. Pylori, esophageal bx's r/out EOE.  Mild gastratis     Type of Anesthesia: MAC (Monitored anesthesia care)    Description of findings: same as post op dx    Procedure: Procedure(s):  UPPER ENDOSCOPY/ bx's    :  Dr. Timothy Lord MD    Assistant(s): Endoscopy Technician-1: Carolina Mcmahon  Endoscopy RN-1: Jules Reilly RN; Kyra Dick RN; Maeve Cochran RN    Devices/implants/grafts/tissues/prosthesis: None    EBL:None    Specimens:   ID Type Source Tests Collected by Time Destination   1 :  duodenal bx's Preservative Duodenum  Naina Quintero MD 2021 3079 Pathology   2 : gastric bx's autrum and body  Preservative   Naina Quintero MD 2021 2140 Pathology   3 : esophageal bx's Preservative Esophagus  Naina Quintero MD 2021 0901 Pathology       Findings: See printed and scanned procedure note    Complications: None    Dr. Timothy Lord MD  2021  9:15 AM

## 2021-07-07 NOTE — H&P
[email or web address]  [phone]      GASTROENTEROLOGY Pre-Procedure H and P      Impression/Plan:   1.  This patient is consented for an EGD for Recurrent nausea vomiting and abdominal pain    Addendum: All lab tests orders pertaining to the procedure have been ordered by the anesthesia personnel and results will be addressed by the anesthesia team  Chief Complaint: Recurrent nausea vomiting and abdominal pain      HPI:  Esteban Brandon is a 48 y.o. male who is being is having an EGD for Recurrent nausea vomiting and abdominal pain    PMH:   Past Medical History:   Diagnosis Date    Cholecystitis     Chronic kidney disease     on , on Home HD    Diabetes ([facility] [address] [facility]     High cholesterol     Hypertension     no meds now    Nausea & vomiting     pt needs Scopolamine    Neuropathy        PSH:   Past Surgical History:   Procedure Laterality Date    COLONOSCOPY N/A 12/31/2020    COLONOSCOPY performed by Eliot Yee MD at [address] HX CHOLECYSTECTOMY      HX [address]      HX ORTHOPAEDIC      jaw surgery    HX ORTHOPAEDIC      left foot    HX WISDOM TEETH EXTRACTION      NEUROLOGICAL PROCEDURE UNLISTED  2020    Lumbar fusion    VASCULAR SURGERY PROCEDURE UNLIST  06/2020    Left Arm AV Fistula       Social HX:   Social History     Socioeconomic History    Marital status:      Spouse name: Not on file    Number of children: Not on file    Years of education: Not on file    Highest education level: Not on file   Occupational History    Not on file   Tobacco Use    Smoking status: Never Smoker    Smokeless tobacco: Never Used   Vaping Use    Vaping Use: Never used   Substance and Sexual Activity    Alcohol use: No    Drug use: Never    Sexual activity: Not on file   Other Topics Concern    Not on file   Social History Narrative    Not on file     Social Determinants of Health     Financial Resource Strain:     Difficulty of Paying Living Expenses:    Food Insecurity:     Worried About Running Out of Food in the Last Year:     Yanet of Food in the Last Year:    Transportation Needs:     Lack of Transportation (Medical):  Lack of Transportation (Non-Medical):    Physical Activity:     Days of Exercise per Week:     Minutes of Exercise per Session:    Stress:     Feeling of Stress :    Social Connections:     Frequency of Communication with Friends and Family:     Frequency of Social Gatherings with Friends and Family:     Attends Restorationist Services:     Active Member of Clubs or Organizations:     Attends Club or Organization Meetings:     Marital Status:    Intimate Partner Violence:     Fear of Current or Ex-Partner:     Emotionally Abused:     Physically Abused:     Sexually Abused:        FHX:   History reviewed. No pertinent family history. Allergy:   Allergies   Allergen Reactions    Amoxicillin Nausea and Vomiting    Gabapentin Nausea and Vomiting and Other (comments)     Urinary retention    Metformin Nausea and Vomiting       Home Medications:     Medications Prior to Admission   Medication Sig    promethazine (Promethegan) 12.5 mg suppository Insert  into rectum every six (6) hours as needed for Nausea.  bumetanide (BUMEX) 1 mg tablet Take 1 mg by mouth two (2) times a day.  lipase-protease-amylase (Creon) 12,000-38,000 -60,000 unit capsule Take 2 Caps by mouth three (3) times daily (with meals).  b complex-vitamin c-folic acid (Dialyvite) 100-1 mg tab tablet Take 1 Tab by mouth daily.  insulin glargine (LANTUS U-100 INSULIN) 100 unit/mL injection 80 Units by SubCUTAneous route nightly.  lisinopriL (PRINIVIL, ZESTRIL) 20 mg tablet Take 20 mg by mouth daily. (Patient not taking: Reported on 6/29/2021)    insulin pump (PATIENT SUPPLIED) misc by SubCUTAneous route as needed. Review of Systems:     Constitutional: No fevers, chills, weight loss, fatigue. Skin: No rashes, pruritis, jaundice, ulcerations, erythema.    HENT: No headaches, nosebleeds, sinus pressure, rhinorrhea, sore throat. Eyes: No visual changes, blurred vision, eye pain, photophobia, jaundice. Cardiovascular: No chest pain, heart palpitations. Respiratory: No cough, SOB, wheezing, chest discomfort, orthopnea. Gastrointestinal:    Genitourinary: No dysuria, bleeding, discharge, pyuria. Musculoskeletal: No weakness, arthralgias, wasting. Endo: No sweats. Heme: No bruising, easy bleeding. Allergies: As noted. Neurological: Cranial nerves intact. Alert and oriented. Gait not assessed. Psychiatric:  No anxiety, depression, hallucinations. Visit Vitals  Ht 5' 11\" (1.803 m)   Wt 124.3 kg (274 lb)   BMI 38.22 kg/m²       Physical Assessment:     constitutional: appearance: well developed, well nourished, normal habitus, no deformities, in no acute distress. skin: inspection: no rashes, ulcers, icterus or other lesions; no clubbing or telangiectasias. palpation: no induration or subcutaneos nodules. eyes: inspection: normal conjunctivae and lids; no jaundice pupils: normal  ENMT: mouth: normal oral mucosa,lips and gums; good dentition. oropharynx: normal tongue, hard and soft palate; posterior pharynx without erithema, exudate or lesions. neck: thyroid: normal size, consistency and position; no masses or tenderness. respiratory: effort: normal chest excursion; no intercostal retraction or accessory muscle use. cardiovascular: abdominal aorta: normal size and position; no bruits. palpation: PMI of normal size and position; normal rhythm; no thrill or murmurs. abdominal: abdomen: normal consistency; no tenderness or masses. hernias: no hernias appreciated. liver: normal size and consistency. spleen: not palpable. rectal: hemoccult/guaiac: not performed. musculoskeletal: digits and nails: no clubbing, cyanosis, petechiae or other inflammatory conditions.  gait: normal gait and station head and neck: normal range of motion; no pain, crepitation or contracture. spine/ribs/pelvis: normal range of motion; no pain, deformity or contracture. neurologic: cranial nerves: II-XII normal.   psychiatric: judgement/insight: within normal limits. memory: within normal limits for recent and remote events. mood and affect: no evidence of depression, anxiety or agitation. orientation: oriented to time, space and person. Basic Metabolic Profile   No results for input(s): NA, K, CL, CO2, BUN, GLU, CA, MG, PHOS in the last 72 hours. No lab exists for component: CREAT      CBC w/Diff    Recent Labs     07/07/21  0756   WBC 8.7   RBC 4.87   HGB 14.2   HCT 44.8   MCV 92.0   MCH 29.2   MCHC 31.7   RDW 16.3*       No results for input(s): GRANS, LYMPH, EOS, PRO, MYELO, METAS, BLAST in the last 72 hours. No lab exists for component: MONO, BASO     Hepatic Function   No results for input(s): ALB, TP, TBILI, AP, AML, LPSE in the last 72 hours. No lab exists for component: DBILI, GPT, SGOT     Coags   No results for input(s): PTP, INR, APTT, INREXT in the last 72 hours. Chon Wells MD  [facility] [address]  Cell: [phone]  Direct pager: [phone]  [email or web address]  [email or web address]

## 2021-07-23 ENCOUNTER — HOSPITAL ENCOUNTER (EMERGENCY)
Age: 51
Discharge: HOME | End: 2021-07-23
Attending: EMERGENCY MEDICINE
Payer: MEDICARE

## 2021-07-23 VITALS
DIASTOLIC BLOOD PRESSURE: 103 MMHG | HEART RATE: 102 BPM | SYSTOLIC BLOOD PRESSURE: 189 MMHG | RESPIRATION RATE: 22 BRPM | OXYGEN SATURATION: 97 %

## 2021-07-23 DIAGNOSIS — R11.2 NON-INTRACTABLE VOMITING WITH NAUSEA, UNSPECIFIED VOMITING TYPE: Primary | ICD-10-CM

## 2021-07-23 DIAGNOSIS — N18.6 ESRD (END STAGE RENAL DISEASE) (HCC): ICD-10-CM

## 2021-07-23 LAB
ALBUMIN SERPL-MCNC: 3.5 G/DL (ref 3.4–5)
ALBUMIN/GLOB SERPL: 0.7 {RATIO} (ref 0.8–1.7)
ALP SERPL-CCNC: 89 U/L (ref 45–117)
ALT SERPL-CCNC: 33 U/L (ref 16–61)
ANION GAP SERPL CALC-SCNC: 10 MMOL/L (ref 3–18)
AST SERPL-CCNC: 28 U/L (ref 10–38)
BASOPHILS # BLD: 0.1 K/UL (ref 0–0.1)
BASOPHILS NFR BLD: 1 % (ref 0–2)
BILIRUB SERPL-MCNC: 0.8 MG/DL (ref 0.2–1)
BUN SERPL-MCNC: 30 MG/DL (ref 7–18)
BUN/CREAT SERPL: 4 (ref 12–20)
CALCIUM SERPL-MCNC: 10.1 MG/DL (ref 8.5–10.1)
CHLORIDE SERPL-SCNC: 90 MMOL/L (ref 100–111)
CO2 SERPL-SCNC: 28 MMOL/L (ref 21–32)
CREAT SERPL-MCNC: 7.32 MG/DL (ref 0.6–1.3)
DIFFERENTIAL METHOD BLD: ABNORMAL
EOSINOPHIL # BLD: 0 K/UL (ref 0–0.4)
EOSINOPHIL NFR BLD: 0 % (ref 0–5)
ERYTHROCYTE [DISTWIDTH] IN BLOOD BY AUTOMATED COUNT: 14.8 % (ref 11.6–14.5)
GLOBULIN SER CALC-MCNC: 5.2 G/DL (ref 2–4)
GLUCOSE SERPL-MCNC: 230 MG/DL (ref 74–99)
HCT VFR BLD AUTO: 46 % (ref 36–48)
HGB BLD-MCNC: 15.9 G/DL (ref 13–16)
LIPASE SERPL-CCNC: 186 U/L (ref 73–393)
LYMPHOCYTES # BLD: 1.8 K/UL (ref 0.9–3.6)
LYMPHOCYTES NFR BLD: 17 % (ref 21–52)
MCH RBC QN AUTO: 29.6 PG (ref 24–34)
MCHC RBC AUTO-ENTMCNC: 34.6 G/DL (ref 31–37)
MCV RBC AUTO: 85.7 FL (ref 74–97)
MONOCYTES # BLD: 1.1 K/UL (ref 0.05–1.2)
MONOCYTES NFR BLD: 10 % (ref 3–10)
NEUTS SEG # BLD: 7.4 K/UL (ref 1.8–8)
NEUTS SEG NFR BLD: 71 % (ref 40–73)
PLATELET # BLD AUTO: 238 K/UL (ref 135–420)
PMV BLD AUTO: 10.1 FL (ref 9.2–11.8)
POTASSIUM SERPL-SCNC: 4.1 MMOL/L (ref 3.5–5.5)
PROT SERPL-MCNC: 8.7 G/DL (ref 6.4–8.2)
RBC # BLD AUTO: 5.37 M/UL (ref 4.35–5.65)
SODIUM SERPL-SCNC: 128 MMOL/L (ref 136–145)
WBC # BLD AUTO: 10.4 K/UL (ref 4.6–13.2)

## 2021-07-23 PROCEDURE — 85025 COMPLETE CBC W/AUTO DIFF WBC: CPT

## 2021-07-23 PROCEDURE — 80053 COMPREHEN METABOLIC PANEL: CPT

## 2021-07-23 PROCEDURE — 96375 TX/PRO/DX INJ NEW DRUG ADDON: CPT

## 2021-07-23 PROCEDURE — 96365 THER/PROPH/DIAG IV INF INIT: CPT

## 2021-07-23 PROCEDURE — 96361 HYDRATE IV INFUSION ADD-ON: CPT

## 2021-07-23 PROCEDURE — 74011000258 HC RX REV CODE- 258: Performed by: EMERGENCY MEDICINE

## 2021-07-23 PROCEDURE — 83690 ASSAY OF LIPASE: CPT

## 2021-07-23 PROCEDURE — 99285 EMERGENCY DEPT VISIT HI MDM: CPT

## 2021-07-23 PROCEDURE — 74011250636 HC RX REV CODE- 250/636: Performed by: EMERGENCY MEDICINE

## 2021-07-23 RX ORDER — SCOLOPAMINE TRANSDERMAL SYSTEM 1 MG/1
1 PATCH, EXTENDED RELEASE TRANSDERMAL
Qty: 10 PATCH | Refills: 0 | Status: SHIPPED | OUTPATIENT
Start: 2021-07-23

## 2021-07-23 RX ORDER — HYDROMORPHONE HYDROCHLORIDE 1 MG/ML
1 INJECTION, SOLUTION INTRAMUSCULAR; INTRAVENOUS; SUBCUTANEOUS
Status: COMPLETED | OUTPATIENT
Start: 2021-07-23 | End: 2021-07-23

## 2021-07-23 RX ADMIN — HYDROMORPHONE HYDROCHLORIDE 1 MG: 1 INJECTION, SOLUTION INTRAMUSCULAR; INTRAVENOUS; SUBCUTANEOUS at 11:57

## 2021-07-23 RX ADMIN — SODIUM CHLORIDE 1000 ML: 900 INJECTION, SOLUTION INTRAVENOUS at 11:33

## 2021-07-23 RX ADMIN — SODIUM CHLORIDE 25 MG: 900 INJECTION, SOLUTION INTRAVENOUS at 11:32

## 2021-07-23 NOTE — ED NOTES
Ya Canas is a 48 y.o. male that was discharged in stable condition. The patients diagnosis, condition and treatment were explained to  patient and aftercare instructions were given. The patient verbalized understanding. Patient armband removed and shredded.

## 2021-07-23 NOTE — DISCHARGE INSTRUCTIONS
Clear liquids advance to bland diet as tolerated. Scopolamine patch topically every 72 hours as needed. Recheck with gastroenterology regarding ongoing treatment options. You were treated in the emergency department with a liter of IV saline, 1 mg of Dilaudid IV, and phenergan mg IV. Electrolytes demonstrated low sodium and chloride levels consistent with vomiting and dehydration. Blood glucose was 230. Liver function studies and pancreas function studies within normal limits.

## 2021-07-23 NOTE — ED NOTES
After giving Dilaudid, patient O2 sat was in the 70s. 2L NC applied. Encouraged patient to take some breaths through his nose.    O2 sat recovered back to 98%

## 2021-07-23 NOTE — ED NOTES
Patient has continued to be drowsy after getting phenergan and dilaudid. VS stable. Spouse at bedside. 2

## 2021-07-23 NOTE — ED NOTES
Patient spouse notified by phone that he is ready for discharge. She is on her way. Patient placed in wheelchair and wheeled to waiting room.

## 2021-07-23 NOTE — ED PROVIDER NOTES
49-year-old male chronic renal failure on home peritoneal dialysis, diabetes, hypertension, hyperlipidemia, repetitive vomiting presents for evaluation of recurrent emesis. For the last several years patient has had cyclical vomiting. Etiology not determined. Symptoms been worse over the last month requiring multiple visits to the ED. He underwent CT scan at an outside facility on 14 June demonstrating some thickening of the bladder wall and chronic perinephric stranding but no clear etiology for the vomiting. Underwent EGD on July 7 unremarkable with the exception of mild gastritis. Been on multiple different antiemetics none of which have provided adequate relief. In attempt to alleviate his symptoms he has been taking cannabis Gummies. This seems to provide him a little bit of relief. Of note the onset of the symptoms long preceded the use of THC product. Current episode began about 2 weeks ago and has been associated with daily emesis. Upon arrival here the patient is moaning with repeated episodes of dry heaves. He is acutely uncomfortable. Complains of severe periumbilical and epigastric pain. Symptoms similar to what he has experienced on multiple occasions in the past.           Past Medical History:   Diagnosis Date    Cholecystitis     Chronic kidney disease     on , on Home HD    Diabetes ([facility] [address]     High cholesterol     Hypertension     no meds now    Nausea & vomiting     pt needs Scopolamine    Neuropathy        Past Surgical History:   Procedure Laterality Date    COLONOSCOPY N/A 12/31/2020    COLONOSCOPY performed by Atanacio Bamberger, MD at [address] HX CHOLECYSTECTOMY      HX HERNIA REPAIR      HX ORTHOPAEDIC      jaw surgery    HX ORTHOPAEDIC      left foot    HX WISDOM TEETH EXTRACTION      NEUROLOGICAL PROCEDURE UNLISTED  2020    Lumbar fusion    VASCULAR SURGERY PROCEDURE UNLIST  06/2020    Left Arm AV Fistula         History reviewed.  No pertinent family history. Social History     Socioeconomic History    Marital status:      Spouse name: Not on file    Number of children: Not on file    Years of education: Not on file    Highest education level: Not on file   Occupational History    Not on file   Tobacco Use    Smoking status: Never Smoker    Smokeless tobacco: Never Used   Vaping Use    Vaping Use: Never used   Substance and Sexual Activity    Alcohol use: No    Drug use: Never    Sexual activity: Not on file   Other Topics Concern    Not on file   Social History Narrative    Not on file     Social Determinants of Health     Financial Resource Strain:     Difficulty of Paying Living Expenses:    Food Insecurity:     Worried About Running Out of Food in the Last Year:     [address] in the Last Year:    Transportation Needs:     Lack of Transportation (Medical):  Lack of Transportation (Non-Medical):    Physical Activity:     Days of Exercise per Week:     Minutes of Exercise per Session:    Stress:     Feeling of Stress :    Social Connections:     Frequency of Communication with Friends and Family:     Frequency of Social Gatherings with Friends and Family:     Attends Yazdanism Services:     Active Member of Clubs or Organizations:     Attends Club or Organization Meetings:     Marital Status:    Intimate Partner Violence:     Fear of Current or Ex-Partner:     Emotionally Abused:     Physically Abused:     Sexually Abused: ALLERGIES: Amoxicillin, Gabapentin, and Metformin    Review of Systems   Constitutional: Negative for fever. Gastrointestinal: Positive for abdominal pain, nausea and vomiting. All other systems reviewed and are negative. Vitals:    07/23/21 1330 07/23/21 1345 07/23/21 1400 07/23/21 1430   BP: (!) 171/90 (!) 171/88 (!) 174/91 (!) 184/92   Pulse:       Resp:       SpO2: 100% 98% 99% 98%            Physical Exam  Vitals and nursing note reviewed.    Constitutional: General: He is in acute distress. Appearance: He is well-developed. HENT:      Head: Normocephalic and atraumatic. Eyes:      General: No scleral icterus. Cardiovascular:      Rate and Rhythm: Normal rate. Heart sounds: Normal heart sounds. Pulmonary:      Effort: Pulmonary effort is normal.      Breath sounds: Normal breath sounds. Abdominal:      Palpations: Abdomen is soft. Comments: Hypoactive bowel sounds. Mild tenderness to palpation in epigastrium. No palpable organomegaly. Skin:     General: Skin is warm and dry. Neurological:      Mental Status: He is alert and oriented to person, place, and time. Recent Results (from the past 12 hour(s))   CBC WITH AUTOMATED DIFF    Collection Time: 07/23/21 11:27 AM   Result Value Ref Range    WBC 10.4 4.6 - 13.2 K/uL    RBC 5.37 4.35 - 5.65 M/uL    HGB 15.9 13.0 - 16.0 g/dL    HCT 46.0 36.0 - 48.0 %    MCV 85.7 74.0 - 97.0 FL    MCH 29.6 24.0 - 34.0 PG    MCHC 34.6 31.0 - 37.0 g/dL    RDW 14.8 (H) 11.6 - 14.5 %    PLATELET 006 789 - 229 K/uL    MPV 10.1 9.2 - 11.8 FL    NEUTROPHILS 71 40 - 73 %    LYMPHOCYTES 17 (L) 21 - 52 %    MONOCYTES 10 3 - 10 %    EOSINOPHILS 0 0 - 5 %    BASOPHILS 1 0 - 2 %    ABS. NEUTROPHILS 7.4 1.8 - 8.0 K/UL    ABS. LYMPHOCYTES 1.8 0.9 - 3.6 K/UL    ABS. MONOCYTES 1.1 0.05 - 1.2 K/UL    ABS. EOSINOPHILS 0.0 0.0 - 0.4 K/UL    ABS.  BASOPHILS 0.1 0.0 - 0.1 K/UL    DF AUTOMATED     METABOLIC PANEL, COMPREHENSIVE    Collection Time: 07/23/21 11:27 AM   Result Value Ref Range    Sodium 128 (L) 136 - 145 mmol/L    Potassium 4.1 3.5 - 5.5 mmol/L    Chloride 90 (L) 100 - 111 mmol/L    CO2 28 21 - 32 mmol/L    Anion gap 10 3.0 - 18 mmol/L    Glucose 230 (H) 74 - 99 mg/dL    BUN 30 (H) 7.0 - 18 MG/DL    Creatinine 7.32 (H) 0.6 - 1.3 MG/DL    BUN/Creatinine ratio 4 (L) 12 - 20      GFR est AA 10 (L) >60 ml/min/1.73m2    GFR est non-AA 8 (L) >60 ml/min/1.73m2    Calcium 10.1 8.5 - 10.1 MG/DL    Bilirubin, total 0.8 0.2 - 1.0 MG/DL    ALT (SGPT) 33 16 - 61 U/L    AST (SGOT) 28 10 - 38 U/L    Alk. phosphatase 89 45 - 117 U/L    Protein, total 8.7 (H) 6.4 - 8.2 g/dL    Albumin 3.5 3.4 - 5.0 g/dL    Globulin 5.2 (H) 2.0 - 4.0 g/dL    A-G Ratio 0.7 (L) 0.8 - 1.7     LIPASE    Collection Time: 07/23/21 11:27 AM   Result Value Ref Range    Lipase 186 73 - 393 U/L       MDM  Number of Diagnoses or Management Options  ESRD (end stage renal disease) (HCC)  Non-intractable vomiting with nausea, unspecified vomiting type  Diagnosis management comments: Impression: Recurrent nausea and vomiting patient with long standing history of similar episodes. IV fluids infused, 1 L normal saline. Labs reviewed. Medicated with Phenergan 25 mg and Dilaudid 1 mg IV. Patient was able to rest more comfortably after this intervention and vomiting subsided. Patient reports he had better relief with a \"patch\" used for nausea. I am unable to find references this on review of the old records foot suspect patient is referring to a Scopolamine patch. Prescription for same issued at discharge. Procedures    Diagnosis:   1. Non-intractable vomiting with nausea, unspecified vomiting type    2. [facility]          Disposition: home    Follow-up Information     Follow up With Specialties Details Why Contact Info    Madelyn Leonard MD Gastroenterology, Internal Medicine Schedule an appointment as soon as possible for a visit  for recheck of ongoing symptoms [address]  [id number] [address] [id number] [facility] EMERGENCY DEPT Emergency Medicine  As needed, If symptoms worsen [address] [facility]  [phone]          Patient's Medications   Start Taking    SCOPOLAMINE (TRANSDERM-SCOP) 1 MG OVER 3 DAYS PT3D    1 Patch by TransDERmal route every seventy-two (72) hours.  Prn nausea   Continue Taking    B COMPLEX-VITAMIN C-FOLIC ACID (DIALYVITE) 100-1 MG TAB TABLET    Take 1 Tab by mouth daily. BUMETANIDE (BUMEX) 1 MG TABLET    Take 1 mg by mouth two (2) times a day. INSULIN GLARGINE (LANTUS U-100 INSULIN) 100 UNIT/ML INJECTION    80 Units by SubCUTAneous route nightly. INSULIN PUMP (PATIENT SUPPLIED) MISC    by SubCUTAneous route as needed. LIPASE-PROTEASE-AMYLASE (CREON) 12,000-38,000 -60,000 UNIT CAPSULE    Take 2 Caps by mouth three (3) times daily (with meals). LISINOPRIL (PRINIVIL, ZESTRIL) 20 MG TABLET    Take 20 mg by mouth daily. PROMETHAZINE (PROMETHEGAN) 12.5 MG SUPPOSITORY    Insert  into rectum every six (6) hours as needed for Nausea.    These Medications have changed    No medications on file   Stop Taking    No medications on file

## 2021-07-23 NOTE — ED TRIAGE NOTES
Patient c/o abdominal pain with vomiting x 2 weeks. He states he has been evaluated for it and they are still trying to figure it out.

## 2021-08-02 ENCOUNTER — TRANSCRIBE ORDER (OUTPATIENT)
Dept: SCHEDULING | Age: 51
End: 2021-08-02

## 2021-08-02 DIAGNOSIS — R10.9 ABDOMINAL PAIN: Primary | ICD-10-CM

## 2021-08-02 DIAGNOSIS — R11.2 NAUSEA AND VOMITING: ICD-10-CM

## 2022-03-18 PROBLEM — N17.9 AKI (ACUTE KIDNEY INJURY) (HCC): Status: ACTIVE | Noted: 2018-10-09

## 2022-03-19 PROBLEM — K85.90 ACUTE PANCREATITIS: Status: ACTIVE | Noted: 2018-10-09

## 2022-03-19 PROBLEM — E11.65 TYPE 2 DIABETES MELLITUS WITH HYPERGLYCEMIA (HCC): Status: ACTIVE | Noted: 2018-10-09

## 2022-03-19 PROBLEM — R74.8 ELEVATED SERUM ALKALINE PHOSPHATASE LEVEL: Status: ACTIVE | Noted: 2018-10-09

## 2022-03-19 PROBLEM — E87.1 HYPONATREMIA: Status: ACTIVE | Noted: 2018-10-09

## 2022-05-08 ENCOUNTER — APPOINTMENT (OUTPATIENT)
Dept: GENERAL RADIOLOGY | Age: 52
DRG: 642 | End: 2022-05-08
Attending: EMERGENCY MEDICINE
Payer: MEDICARE

## 2022-05-08 ENCOUNTER — APPOINTMENT (OUTPATIENT)
Dept: CT IMAGING | Age: 52
DRG: 642 | End: 2022-05-08
Attending: EMERGENCY MEDICINE
Payer: MEDICARE

## 2022-05-08 ENCOUNTER — HOSPITAL ENCOUNTER (INPATIENT)
Age: 52
LOS: 4 days | Discharge: HOME HEALTH | DRG: 642 | End: 2022-05-12
Attending: EMERGENCY MEDICINE | Admitting: INTERNAL MEDICINE
Payer: MEDICARE

## 2022-05-08 DIAGNOSIS — G93.40 ACUTE ENCEPHALOPATHY: Primary | ICD-10-CM

## 2022-05-08 DIAGNOSIS — Z99.2 END STAGE RENAL DISEASE ON DIALYSIS (HCC): ICD-10-CM

## 2022-05-08 DIAGNOSIS — N18.6 END STAGE RENAL DISEASE ON DIALYSIS (HCC): ICD-10-CM

## 2022-05-08 LAB
ALBUMIN SERPL-MCNC: 2.6 G/DL (ref 3.4–5)
ALBUMIN/GLOB SERPL: 0.6 {RATIO} (ref 0.8–1.7)
ALP SERPL-CCNC: 186 U/L (ref 45–117)
ALT SERPL-CCNC: 27 U/L (ref 16–61)
AMMONIA PLAS-SCNC: 131 UMOL/L (ref 11–32)
ANION GAP SERPL CALC-SCNC: 12 MMOL/L (ref 3–18)
AST SERPL-CCNC: 50 U/L (ref 10–38)
BASOPHILS # BLD: 0.1 K/UL (ref 0–0.1)
BASOPHILS NFR BLD: 1 % (ref 0–2)
BILIRUB SERPL-MCNC: 1.3 MG/DL (ref 0.2–1)
BNP SERPL-MCNC: ABNORMAL PG/ML (ref 0–900)
BUN SERPL-MCNC: 52 MG/DL (ref 7–18)
BUN/CREAT SERPL: 8 (ref 12–20)
CALCIUM SERPL-MCNC: 8.8 MG/DL (ref 8.5–10.1)
CHLORIDE SERPL-SCNC: 98 MMOL/L (ref 100–111)
CO2 SERPL-SCNC: 24 MMOL/L (ref 21–32)
CREAT SERPL-MCNC: 6.35 MG/DL (ref 0.6–1.3)
DIFFERENTIAL METHOD BLD: ABNORMAL
EOSINOPHIL # BLD: 0.1 K/UL (ref 0–0.4)
EOSINOPHIL NFR BLD: 1 % (ref 0–5)
ERYTHROCYTE [DISTWIDTH] IN BLOOD BY AUTOMATED COUNT: 21.2 % (ref 11.6–14.5)
GLOBULIN SER CALC-MCNC: 4.4 G/DL (ref 2–4)
GLUCOSE SERPL-MCNC: 87 MG/DL (ref 74–99)
HCT VFR BLD AUTO: 37.8 % (ref 36–48)
HGB BLD-MCNC: 12.6 G/DL (ref 13–16)
IMM GRANULOCYTES # BLD AUTO: 0.1 K/UL (ref 0–0.04)
IMM GRANULOCYTES NFR BLD AUTO: 1 % (ref 0–0.5)
LYMPHOCYTES # BLD: 1 K/UL (ref 0.9–3.6)
LYMPHOCYTES NFR BLD: 14 % (ref 21–52)
MCH RBC QN AUTO: 29.2 PG (ref 24–34)
MCHC RBC AUTO-ENTMCNC: 33.3 G/DL (ref 31–37)
MCV RBC AUTO: 87.5 FL (ref 78–100)
MONOCYTES # BLD: 1.1 K/UL (ref 0.05–1.2)
MONOCYTES NFR BLD: 16 % (ref 3–10)
NEUTS SEG # BLD: 4.7 K/UL (ref 1.8–8)
NEUTS SEG NFR BLD: 67 % (ref 40–73)
NRBC # BLD: 0 K/UL (ref 0–0.01)
NRBC BLD-RTO: 0 PER 100 WBC
PH BLDV: 7.44 [PH] (ref 7.32–7.42)
PLATELET # BLD AUTO: 189 K/UL (ref 135–420)
PMV BLD AUTO: 10.1 FL (ref 9.2–11.8)
POTASSIUM SERPL-SCNC: 5.7 MMOL/L (ref 3.5–5.5)
PROT SERPL-MCNC: 7 G/DL (ref 6.4–8.2)
RBC # BLD AUTO: 4.32 M/UL (ref 4.35–5.65)
SODIUM SERPL-SCNC: 134 MMOL/L (ref 136–145)
TROPONIN-HIGH SENSITIVITY: 39 NG/L (ref 0–78)
WBC # BLD AUTO: 7 K/UL (ref 4.6–13.2)

## 2022-05-08 PROCEDURE — 70450 CT HEAD/BRAIN W/O DYE: CPT

## 2022-05-08 PROCEDURE — 83880 ASSAY OF NATRIURETIC PEPTIDE: CPT

## 2022-05-08 PROCEDURE — 85025 COMPLETE CBC W/AUTO DIFF WBC: CPT

## 2022-05-08 PROCEDURE — 82140 ASSAY OF AMMONIA: CPT

## 2022-05-08 PROCEDURE — 99285 EMERGENCY DEPT VISIT HI MDM: CPT

## 2022-05-08 PROCEDURE — 93005 ELECTROCARDIOGRAM TRACING: CPT

## 2022-05-08 PROCEDURE — 84484 ASSAY OF TROPONIN QUANT: CPT

## 2022-05-08 PROCEDURE — 65270000046 HC RM TELEMETRY

## 2022-05-08 PROCEDURE — 82800 BLOOD PH: CPT

## 2022-05-08 PROCEDURE — 80053 COMPREHEN METABOLIC PANEL: CPT

## 2022-05-08 PROCEDURE — 71045 X-RAY EXAM CHEST 1 VIEW: CPT

## 2022-05-08 NOTE — Clinical Note
Status[de-identified] INPATIENT [101]   Type of Bed: Telemetry [19]   Cardiac Monitoring Required?: Yes   Inpatient Hospitalization Certified Necessary for the Following Reasons: 3.  Patient receiving treatment that can only be provided in an inpatient setting (further clarification in H&P documentation)   Admitting Diagnosis: Encephalopathy [567301]   Admitting Physician: Basim Roque [[id number]]   Attending Physician: Basim Roque [[id number]]   Estimated Length of Stay: 3-4 Midnights   Discharge Plan[de-identified] [facility]

## 2022-05-09 ENCOUNTER — APPOINTMENT (OUTPATIENT)
Dept: MRI IMAGING | Age: 52
DRG: 642 | End: 2022-05-09
Attending: INTERNAL MEDICINE
Payer: MEDICARE

## 2022-05-09 PROBLEM — G93.41 ACUTE METABOLIC ENCEPHALOPATHY: Status: ACTIVE | Noted: 2022-05-09

## 2022-05-09 LAB
ALBUMIN SERPL-MCNC: 2.4 G/DL (ref 3.4–5)
ALBUMIN/GLOB SERPL: 0.6 {RATIO} (ref 0.8–1.7)
ALP SERPL-CCNC: 166 U/L (ref 45–117)
ALT SERPL-CCNC: 24 U/L (ref 16–61)
ANION GAP SERPL CALC-SCNC: 14 MMOL/L (ref 3–18)
AST SERPL-CCNC: 33 U/L (ref 10–38)
ATRIAL RATE: 74 BPM
BASOPHILS # BLD: 0.1 K/UL (ref 0–0.1)
BASOPHILS NFR BLD: 1 % (ref 0–2)
BILIRUB SERPL-MCNC: 1.4 MG/DL (ref 0.2–1)
BUN SERPL-MCNC: 55 MG/DL (ref 7–18)
BUN/CREAT SERPL: 8 (ref 12–20)
CALCIUM SERPL-MCNC: 9 MG/DL (ref 8.5–10.1)
CALCULATED P AXIS, ECG09: 35 DEGREES
CALCULATED R AXIS, ECG10: -36 DEGREES
CALCULATED T AXIS, ECG11: 74 DEGREES
CHLORIDE SERPL-SCNC: 100 MMOL/L (ref 100–111)
CO2 SERPL-SCNC: 21 MMOL/L (ref 21–32)
CREAT SERPL-MCNC: 6.97 MG/DL (ref 0.6–1.3)
DIAGNOSIS, 93000: NORMAL
DIFFERENTIAL METHOD BLD: ABNORMAL
EOSINOPHIL # BLD: 0.1 K/UL (ref 0–0.4)
EOSINOPHIL NFR BLD: 1 % (ref 0–5)
ERYTHROCYTE [DISTWIDTH] IN BLOOD BY AUTOMATED COUNT: 21.4 % (ref 11.6–14.5)
EST. AVERAGE GLUCOSE BLD GHB EST-MCNC: 114 MG/DL
GLOBULIN SER CALC-MCNC: 4.1 G/DL (ref 2–4)
GLUCOSE BLD STRIP.AUTO-MCNC: 111 MG/DL (ref 70–110)
GLUCOSE BLD STRIP.AUTO-MCNC: 122 MG/DL (ref 70–110)
GLUCOSE BLD STRIP.AUTO-MCNC: 94 MG/DL (ref 70–110)
GLUCOSE SERPL-MCNC: 104 MG/DL (ref 74–99)
HBA1C MFR BLD: 5.6 % (ref 4.2–5.6)
HBV SURFACE AB SER QL IA: POSITIVE
HBV SURFACE AB SERPL IA-ACNC: 91.01 MIU/ML
HBV SURFACE AG SER QL: <0.1 INDEX
HBV SURFACE AG SER QL: NEGATIVE
HCT VFR BLD AUTO: 33.3 % (ref 36–48)
HEP BS AB COMMENT,HBSAC: NORMAL
HGB BLD-MCNC: 11.1 G/DL (ref 13–16)
IMM GRANULOCYTES # BLD AUTO: 0 K/UL (ref 0–0.04)
IMM GRANULOCYTES NFR BLD AUTO: 1 % (ref 0–0.5)
LACTATE SERPL-SCNC: 2.1 MMOL/L (ref 0.4–2)
LYMPHOCYTES # BLD: 0.8 K/UL (ref 0.9–3.6)
LYMPHOCYTES NFR BLD: 10 % (ref 21–52)
MCH RBC QN AUTO: 29.4 PG (ref 24–34)
MCHC RBC AUTO-ENTMCNC: 33.3 G/DL (ref 31–37)
MCV RBC AUTO: 88.3 FL (ref 78–100)
MONOCYTES # BLD: 1.1 K/UL (ref 0.05–1.2)
MONOCYTES NFR BLD: 14 % (ref 3–10)
NEUTS SEG # BLD: 5.7 K/UL (ref 1.8–8)
NEUTS SEG NFR BLD: 74 % (ref 40–73)
NRBC # BLD: 0 K/UL (ref 0–0.01)
NRBC BLD-RTO: 0 PER 100 WBC
P-R INTERVAL, ECG05: 170 MS
PLATELET # BLD AUTO: 184 K/UL (ref 135–420)
PMV BLD AUTO: 10.9 FL (ref 9.2–11.8)
POTASSIUM SERPL-SCNC: 4.8 MMOL/L (ref 3.5–5.5)
PROT SERPL-MCNC: 6.5 G/DL (ref 6.4–8.2)
Q-T INTERVAL, ECG07: 466 MS
QRS DURATION, ECG06: 112 MS
QTC CALCULATION (BEZET), ECG08: 517 MS
RBC # BLD AUTO: 3.77 M/UL (ref 4.35–5.65)
SODIUM SERPL-SCNC: 135 MMOL/L (ref 136–145)
VENTRICULAR RATE, ECG03: 74 BPM
WBC # BLD AUTO: 7.7 K/UL (ref 4.6–13.2)

## 2022-05-09 PROCEDURE — 74011250636 HC RX REV CODE- 250/636: Performed by: INTERNAL MEDICINE

## 2022-05-09 PROCEDURE — 36415 COLL VENOUS BLD VENIPUNCTURE: CPT

## 2022-05-09 PROCEDURE — 74011000272 HC RX REV CODE- 272: Performed by: INTERNAL MEDICINE

## 2022-05-09 PROCEDURE — 77010033678 HC OXYGEN DAILY

## 2022-05-09 PROCEDURE — 82962 GLUCOSE BLOOD TEST: CPT

## 2022-05-09 PROCEDURE — 70551 MRI BRAIN STEM W/O DYE: CPT

## 2022-05-09 PROCEDURE — 87040 BLOOD CULTURE FOR BACTERIA: CPT

## 2022-05-09 PROCEDURE — 74011000250 HC RX REV CODE- 250: Performed by: INTERNAL MEDICINE

## 2022-05-09 PROCEDURE — 85025 COMPLETE CBC W/AUTO DIFF WBC: CPT

## 2022-05-09 PROCEDURE — 83036 HEMOGLOBIN GLYCOSYLATED A1C: CPT

## 2022-05-09 PROCEDURE — 65270000046 HC RM TELEMETRY

## 2022-05-09 PROCEDURE — 95816 EEG AWAKE AND DROWSY: CPT | Performed by: INTERNAL MEDICINE

## 2022-05-09 PROCEDURE — 74011250637 HC RX REV CODE- 250/637: Performed by: INTERNAL MEDICINE

## 2022-05-09 PROCEDURE — 86706 HEP B SURFACE ANTIBODY: CPT

## 2022-05-09 PROCEDURE — 90935 HEMODIALYSIS ONE EVALUATION: CPT

## 2022-05-09 PROCEDURE — 87340 HEPATITIS B SURFACE AG IA: CPT

## 2022-05-09 PROCEDURE — 83605 ASSAY OF LACTIC ACID: CPT

## 2022-05-09 PROCEDURE — 2709999900 HC NON-CHARGEABLE SUPPLY

## 2022-05-09 PROCEDURE — 80053 COMPREHEN METABOLIC PANEL: CPT

## 2022-05-09 RX ORDER — HYDRALAZINE HYDROCHLORIDE 20 MG/ML
20 INJECTION INTRAMUSCULAR; INTRAVENOUS
Status: DISCONTINUED | OUTPATIENT
Start: 2022-05-09 | End: 2022-05-12 | Stop reason: HOSPADM

## 2022-05-09 RX ORDER — SODIUM CHLORIDE 0.9 % (FLUSH) 0.9 %
5-40 SYRINGE (ML) INJECTION EVERY 8 HOURS
Status: DISCONTINUED | OUTPATIENT
Start: 2022-05-09 | End: 2022-05-12 | Stop reason: HOSPADM

## 2022-05-09 RX ORDER — SEVELAMER CARBONATE 800 MG/1
800 TABLET, FILM COATED ORAL 3 TIMES DAILY
COMMUNITY

## 2022-05-09 RX ORDER — ACETAMINOPHEN 325 MG/1
650 TABLET ORAL
Status: DISCONTINUED | OUTPATIENT
Start: 2022-05-09 | End: 2022-05-12 | Stop reason: HOSPADM

## 2022-05-09 RX ORDER — ONDANSETRON 2 MG/ML
4 INJECTION INTRAMUSCULAR; INTRAVENOUS
Status: DISCONTINUED | OUTPATIENT
Start: 2022-05-09 | End: 2022-05-12 | Stop reason: HOSPADM

## 2022-05-09 RX ORDER — INSULIN LISPRO 100 [IU]/ML
INJECTION, SOLUTION INTRAVENOUS; SUBCUTANEOUS EVERY 4 HOURS
Status: DISCONTINUED | OUTPATIENT
Start: 2022-05-09 | End: 2022-05-09

## 2022-05-09 RX ORDER — POLYETHYLENE GLYCOL 3350 17 G/17G
17 POWDER, FOR SOLUTION ORAL DAILY PRN
Status: DISCONTINUED | OUTPATIENT
Start: 2022-05-09 | End: 2022-05-12 | Stop reason: HOSPADM

## 2022-05-09 RX ORDER — DEXTROSE MONOHYDRATE 50 MG/ML
50 INJECTION, SOLUTION INTRAVENOUS CONTINUOUS
Status: DISCONTINUED | OUTPATIENT
Start: 2022-05-09 | End: 2022-05-11

## 2022-05-09 RX ORDER — MAGNESIUM SULFATE 100 %
16 CRYSTALS MISCELLANEOUS AS NEEDED
Status: DISCONTINUED | OUTPATIENT
Start: 2022-05-09 | End: 2022-05-12 | Stop reason: HOSPADM

## 2022-05-09 RX ORDER — ACETAMINOPHEN 650 MG/1
650 SUPPOSITORY RECTAL
Status: DISCONTINUED | OUTPATIENT
Start: 2022-05-09 | End: 2022-05-12 | Stop reason: HOSPADM

## 2022-05-09 RX ORDER — INSULIN LISPRO 100 [IU]/ML
INJECTION, SOLUTION INTRAVENOUS; SUBCUTANEOUS EVERY 6 HOURS
Status: DISCONTINUED | OUTPATIENT
Start: 2022-05-09 | End: 2022-05-11

## 2022-05-09 RX ORDER — SODIUM CHLORIDE 0.9 % (FLUSH) 0.9 %
5-40 SYRINGE (ML) INJECTION AS NEEDED
Status: DISCONTINUED | OUTPATIENT
Start: 2022-05-09 | End: 2022-05-12 | Stop reason: HOSPADM

## 2022-05-09 RX ORDER — HYDRALAZINE HYDROCHLORIDE 25 MG/1
25 TABLET, FILM COATED ORAL 3 TIMES DAILY
COMMUNITY

## 2022-05-09 RX ORDER — DEXTROSE MONOHYDRATE 100 MG/ML
0-250 INJECTION, SOLUTION INTRAVENOUS AS NEEDED
Status: DISCONTINUED | OUTPATIENT
Start: 2022-05-09 | End: 2022-05-12 | Stop reason: HOSPADM

## 2022-05-09 RX ORDER — HEPARIN SODIUM 5000 [USP'U]/ML
5000 INJECTION, SOLUTION INTRAVENOUS; SUBCUTANEOUS EVERY 12 HOURS
Status: DISCONTINUED | OUTPATIENT
Start: 2022-05-09 | End: 2022-05-12 | Stop reason: HOSPADM

## 2022-05-09 RX ADMIN — LACTULOSE 500 ML: 10 SOLUTION ORAL at 09:29

## 2022-05-09 RX ADMIN — DEXTROSE MONOHYDRATE 75 ML/HR: 50 INJECTION, SOLUTION INTRAVENOUS at 09:29

## 2022-05-09 RX ADMIN — HYDRALAZINE HYDROCHLORIDE 20 MG: 20 INJECTION, SOLUTION INTRAMUSCULAR; INTRAVENOUS at 04:58

## 2022-05-09 RX ADMIN — SODIUM CHLORIDE, PRESERVATIVE FREE 10 ML: 5 INJECTION INTRAVENOUS at 22:01

## 2022-05-09 RX ADMIN — HYDRALAZINE HYDROCHLORIDE 20 MG: 20 INJECTION, SOLUTION INTRAMUSCULAR; INTRAVENOUS at 18:45

## 2022-05-09 RX ADMIN — SODIUM CHLORIDE, PRESERVATIVE FREE 10 ML: 5 INJECTION INTRAVENOUS at 05:07

## 2022-05-09 RX ADMIN — ONDANSETRON 4 MG: 2 INJECTION INTRAMUSCULAR; INTRAVENOUS at 06:11

## 2022-05-09 RX ADMIN — HEPARIN SODIUM 5000 UNITS: 5000 INJECTION INTRAVENOUS; SUBCUTANEOUS at 04:59

## 2022-05-09 NOTE — ED PROVIDER NOTES
EMERGENCY DEPARTMENT HISTORY AND PHYSICAL EXAM    8:59 PM patient seen at this time in room 9      Date: 5/8/2022  Patient Name: Sully Conde    History of Presenting Illness     Chief Complaint   Patient presents with    Altered mental status         History Provided By: Paramedics    Additional History (Context): Sully Conde is a 46 y.o. male presents with patient performs dialysis at home, per wife who called paramedics has been confused since yesterday. The patient is mildly hypertensive 185/100 or so. At the time my exam all he does is answer yes. Does not follow commands. He is protecting his airway. History review of systems limited by patient's altered mental status. Prior admission for metabolic encephalopathy noted through the [facility] system. Nephrologist Dr Edi Avery    PCP: Kemi Howard NP    Chief Complaint:   Duration:    Timing:    Location:   Quality:   Severity:   Modifying Factors:   Associated Symptoms:       Current Outpatient Medications   Medication Sig Dispense Refill    OTHER,NON-FORMULARY, Please collect urine culture and sensitivity upon completion of Cipro.  1 Each 0    promethazine (PHENERGAN) 25 mg tablet       NIFEdipine ER (ADALAT CC) 30 mg ER tablet       sevelamer carbonate (RENVELA) 2.4 gram pwpk oral powder VIGOROUSLY MIX CONTENTS OF 1 PACKET IN WATER (IT WILL NOT DISSOLVE) AND DRINK 3 TIMES DAILY WITH MEALS      ezetimibe (ZETIA) 10 mg tablet       ciprofloxacin HCl (CIPRO) 500 mg tablet       carvediloL (COREG) 6.25 mg tablet       promethazine (Promethegan) 25 mg suppository Promethegan 25 mg rectal suppository   UNWRAP AND INSERT 1 SUPPOSITORY RECTALLY EVERY 6 HOURS AS NEEDED FOR NAUSEA OR VOMITING      docusate sodium (COLACE) 100 mg capsule       bumetanide (BUMEX) 2 mg tablet       omeprazole (PRILOSEC) 40 mg capsule       isosorbide mononitrate ER (IMDUR) 30 mg tablet       dicyclomine (BENTYL) 20 mg tablet TAKE 1 TABLET BY MOUTH THREE TIMES DAILY AS NEEDED FOR CONSTIPATION OR CRAMPING      hydrALAZINE (APRESOLINE) 50 mg tablet       HYDROmorphone (DILAUDID) 2 mg tablet TAKE 1 TABLET BY MOUTH EVERY 8 HOURS AS NEEDED      imipramine (TOFRANIL) 25 mg tablet Take 25 mg by mouth nightly.  Microlet Lancet misc USE AS DIRECTED TO TEST BLOOD GLUCOSE THREE TIMES DAILY      Narcan 4 mg/actuation nasal spray INSERT 1 SPRAY BY INTRANASAL ROUTE AS DIRECTED FOR OPIATE OVERDOSE/REVERSAL. MAY REPEAT IN 2-3 MINS IF NEEDED.  ondansetron (ZOFRAN ODT) 4 mg disintegrating tablet       senna-docusate (Senokot-S) 8.6-50 mg per tablet 1 tablet in the evening as needed      scopolamine (TRANSDERM-SCOP) 1 mg over 3 days pt3d 1 Patch by TransDERmal route every seventy-two (72) hours. Prn nausea 10 Patch 0    lisinopriL (PRINIVIL, ZESTRIL) 20 mg tablet Take 20 mg by mouth daily.  lipase-protease-amylase (Creon) 12,000-38,000 -60,000 unit capsule Take 2 Caps by mouth three (3) times daily (with meals).  b complex-vitamin c-folic acid (Dialyvite) 100-1 mg tab tablet Take 1 Tab by mouth daily.  insulin glargine (LANTUS U-100 INSULIN) 100 unit/mL injection 80 Units by SubCUTAneous route nightly.          Past History     Past Medical History:  Past Medical History:   Diagnosis Date    Cholecystitis     Chronic kidney disease     on , on Home HD    Diabetes ([address]     High cholesterol     Hypertension     no meds now    Nausea & vomiting     pt needs Scopolamine    Neuropathy        Past Surgical History:  Past Surgical History:   Procedure Laterality Date    COLONOSCOPY N/A 12/31/2020    COLONOSCOPY performed by Tacho Norman MD at [facility] ENDOSCOPY    HX CHOLECYSTECTOMY      HX HERNIA REPAIR      HX ORTHOPAEDIC      jaw surgery    HX ORTHOPAEDIC      left foot    HX WISDOM TEETH EXTRACTION      NEUROLOGICAL PROCEDURE UNLISTED  2020    Lumbar fusion    VASCULAR SURGERY PROCEDURE UNLIST  06/2020    Left Arm AV Fistula Family History:  No family history on file. Social History:  Social History     Tobacco Use    Smoking status: Never Smoker    Smokeless tobacco: Never Used   Vaping Use    Vaping Use: Never used   Substance Use Topics    Alcohol use: No    Drug use: Never       Allergies: Allergies   Allergen Reactions    Opioids - Morphine Analogues Swelling     \"stops my body from recovering\"      Amitriptyline Other (comments)     Myalgia noted [facility] records  GI Distress      Amoxicillin Nausea and Vomiting    Calcium Other (comments)     \"doesn't metabolize in my system quick enough by the time I take the next dose\"    Calcium Channel Blocking Agent Diltiazem Analogues Other (comments)    Canagliflozin Other (comments)    Empagliflozin Other (comments)    Gabapentin Nausea and Vomiting and Other (comments)     Urinary retention    Metformin Nausea and Vomiting    Nifedipine Other (comments)    Oxycodone Hcl-Oxycodone-Asa Other (comments)     \"doesn't metabolize in my system quick enough by the time I take the next dose\"  Gi Distress           Review of Systems     Review of Systems      Physical Exam       Patient Vitals for the past 12 hrs:   Temp Pulse Resp BP SpO2   05/08/22 2232  76 27 (!) 177/93 100 %   05/08/22 2105 97.8 °F (36.6 °C) 76 23 (!) 180/97 100 %       IPVITALS  Patient Vitals for the past 24 hrs:   BP Temp Pulse Resp SpO2   05/08/22 2232 (!) 177/93  76 27 100 %   05/08/22 2105 (!) 180/97 97.8 °F (36.6 °C) 76 23 100 %       Physical Exam  Vitals and nursing note reviewed. Constitutional:       Appearance: He is well-developed. He is ill-appearing. Comments: Sleepy   HENT:      Head: Normocephalic and atraumatic. Eyes:      General: No scleral icterus. Extraocular Movements: Extraocular movements intact. Conjunctiva/sclera: Conjunctivae normal.      Comments: Large pupils, bilaterally reactive. Neck:      Vascular: No JVD.    Cardiovascular:      Rate and Rhythm: Normal rate and regular rhythm. Heart sounds: Normal heart sounds. Comments: 4 intact extremity pulses  Pulmonary:      Effort: Pulmonary effort is normal.      Breath sounds: Normal breath sounds. Abdominal:      Palpations: Abdomen is soft. There is no mass. Tenderness: There is no abdominal tenderness. Musculoskeletal:         General: Normal range of motion. Cervical back: Normal range of motion and neck supple. Lymphadenopathy:      Cervical: No cervical adenopathy. Skin:     General: Skin is warm and dry. Neurological:      Comments: Responds to his name by saying yes. Does not follow command to squeeze hands or plantarflex. Diagnostic Study Results   Labs -  Recent Results (from the past 24 hour(s))   CBC WITH AUTOMATED DIFF    Collection Time: 05/08/22  8:55 PM   Result Value Ref Range    WBC 7.0 4.6 - 13.2 K/uL    RBC 4.32 (L) 4.35 - 5.65 M/uL    HGB 12.6 (L) 13.0 - 16.0 g/dL    HCT 37.8 36.0 - 48.0 %    MCV 87.5 78.0 - 100.0 FL    MCH 29.2 24.0 - 34.0 PG    MCHC 33.3 31.0 - 37.0 g/dL    RDW 21.2 (H) 11.6 - 14.5 %    PLATELET 125 588 - 288 K/uL    MPV 10.1 9.2 - 11.8 FL    NRBC 0.0 0  WBC    ABSOLUTE NRBC 0.00 0.00 - 0.01 K/uL    NEUTROPHILS 67 40 - 73 %    LYMPHOCYTES 14 (L) 21 - 52 %    MONOCYTES 16 (H) 3 - 10 %    EOSINOPHILS 1 0 - 5 %    BASOPHILS 1 0 - 2 %    IMMATURE GRANULOCYTES 1 (H) 0.0 - 0.5 %    ABS. NEUTROPHILS 4.7 1.8 - 8.0 K/UL    ABS. LYMPHOCYTES 1.0 0.9 - 3.6 K/UL    ABS. MONOCYTES 1.1 0.05 - 1.2 K/UL    ABS. EOSINOPHILS 0.1 0.0 - 0.4 K/UL    ABS. BASOPHILS 0.1 0.0 - 0.1 K/UL    ABS. IMM.  GRANS. 0.1 (H) 0.00 - 0.04 K/UL    DF AUTOMATED     METABOLIC PANEL, COMPREHENSIVE    Collection Time: 05/08/22  8:55 PM   Result Value Ref Range    Sodium 134 (L) 136 - 145 mmol/L    Potassium 5.7 (H) 3.5 - 5.5 mmol/L    Chloride 98 (L) 100 - 111 mmol/L    CO2 24 21 - 32 mmol/L    Anion gap 12 3.0 - 18 mmol/L    Glucose 87 74 - 99 mg/dL    BUN 52 (H) 7.0 - 18 MG/DL    Creatinine 6.35 (H) 0.6 - 1.3 MG/DL    BUN/Creatinine ratio 8 (L) 12 - 20      GFR est AA 11 (L) >60 ml/min/1.73m2    GFR est non-AA 9 (L) >60 ml/min/1.73m2    Calcium 8.8 8.5 - 10.1 MG/DL    Bilirubin, total 1.3 (H) 0.2 - 1.0 MG/DL    ALT (SGPT) 27 16 - 61 U/L    AST (SGOT) 50 (H) 10 - 38 U/L    Alk. phosphatase 186 (H) 45 - 117 U/L    Protein, total 7.0 6.4 - 8.2 g/dL    Albumin 2.6 (L) 3.4 - 5.0 g/dL    Globulin 4.4 (H) 2.0 - 4.0 g/dL    A-G Ratio 0.6 (L) 0.8 - 1.7     TROPONIN-HIGH SENSITIVITY    Collection Time: 05/08/22  8:55 PM   Result Value Ref Range    Troponin-High Sensitivity 39 0 - 78 ng/L   NT-PRO BNP    Collection Time: 05/08/22  8:55 PM   Result Value Ref Range    NT pro-BNP 94,408 (H) 0 - 900 PG/ML   PH, VENOUS    Collection Time: 05/08/22  8:55 PM   Result Value Ref Range    VENOUS PH 7.44 (H) 7.32 - 7.42         Radiologic Studies -   CT HEAD WO CONT   Final Result      No clearly acute intracranial findings. Of note, MRI is more sensitive for   detecting acute infarct. Mild to moderate burden of periventricular and patchy subcortical white matter   low-attenuation, likely chronic microvascular ischemic change. XR CHEST PORT   Final Result      Mildly enlarged cardiac silhouette with evidence of interstitial   infiltrate/edema. Low lung volumes may exaggerate findings. CT HEAD WO CONT    Result Date: 5/8/2022  EXAMINATION: CT head without contrast INDICATION: Altered mental status COMPARISON: None TECHNIQUE: CT head without contrast with multiplanar reformations. All CT scans at this facility are performed using dose optimization technique as appropriate to a performed exam, to include automated exposure control, adjustment of the mA and/or kV according to patient size (including appropriate matching first site specific examinations), or use of iterative reconstruction technique. FINDINGS: No focal mass effect or shift of midline structures.  Mild to moderate burden of periventricular and patchy subcortical white matter low-attenuation. No acute intracranial hemorrhage. No abnormal extra-axial collection. Ventricles are normal in size and configuration. ICA siphon atherosclerotic calcifications. Imaged paranasal sinuses and mastoids well-aerated. Calvarium intact. Superficial soft tissues unremarkable. No clearly acute intracranial findings. Of note, MRI is more sensitive for detecting acute infarct. Mild to moderate burden of periventricular and patchy subcortical white matter low-attenuation, likely chronic microvascular ischemic change. XR CHEST PORT    Result Date: 5/8/2022  EXAMINATION: Chest single view INDICATION: Edema, altered mental status COMPARISON: None FINDINGS: Single frontal view the chest obtained. Low lung volumes limits evaluation. Mildly enlarged cardiac silhouette. Perihilar and beyond interstitial prominence. No confluent consolidation. No evidence of pneumothorax. No acute osseous findings. Mildly enlarged cardiac silhouette with evidence of interstitial infiltrate/edema. Low lung volumes may exaggerate findings. Medications ordered:   Medications - No data to display      Medical Decision Making   Initial Medical Decision Making and DDx:  Entirely consistent with metabolic encephalopathy, also possible is stroke or intracranial hemorrhage. ED Course: Progress Notes, Reevaluation, and Consults:  ED Course as of 05/08/22 2309   Sun May 08, 2022   2201 This is a late entry discussed with the patient's wife he does home hemodialysis, yesterday started to have decreased mental status, he is getting worse over the last 24 hours. He produces only a small amount of urine not even daily.  [CB]   2202 I independently reviewed head CT and chest x-ray negative for any acute process [CB]   2212 Twelve-lead EKG sinus rhythm at 74 no acute process [CB]      ED Course User Index  [CB] Haleigh Harris MD     11:09 PM d/w Dr Tori Almanza hospist discussed home dialysis, prior admission with acute encephalopathy thought to be due to uremia, BUN today is 52 this is probably the least part of the picture. Ammonia is pending. Head CT negative. Agrees with admission. I will reach out to nephrology. I am the first provider for this patient. I reviewed the vital signs, available nursing notes, past medical history, past surgical history, family history and social history. Patient Vitals for the past 12 hrs:   Temp Pulse Resp BP SpO2   05/08/22 2232  76 27 (!) 177/93 100 %   05/08/22 2105 97.8 °F (36.6 °C) 76 23 (!) 180/97 100 %       Vital Signs-Reviewed the patient's vital signs. Pulse Oximetry Analysis, Cardiac Monitor, 12 lead ekg:      Interpreted by the EP. Records Reviewed: Nursing notes reviewed (Time of Review: 8:59 PM)    Procedures:   Critical Care Time:   Aspirin: (was aspirin given for stroke?)    Diagnosis     Clinical Impression:   1. Acute encephalopathy    2. End stage renal disease on dialysis [facility])        Disposition:       Follow-up Information    None          Patient's Medications   Start Taking    No medications on file   Continue Taking    B COMPLEX-VITAMIN C-FOLIC ACID (DIALYVITE) 100-1 MG TAB TABLET    Take 1 Tab by mouth daily. BUMETANIDE (BUMEX) 2 MG TABLET        CARVEDILOL (COREG) 6.25 MG TABLET        CIPROFLOXACIN HCL (CIPRO) 500 MG TABLET        DICYCLOMINE (BENTYL) 20 MG TABLET    TAKE 1 TABLET BY MOUTH THREE TIMES DAILY AS NEEDED FOR CONSTIPATION OR CRAMPING    DOCUSATE SODIUM (COLACE) 100 MG CAPSULE        EZETIMIBE (ZETIA) 10 MG TABLET        HYDRALAZINE (APRESOLINE) 50 MG TABLET        HYDROMORPHONE (DILAUDID) 2 MG TABLET    TAKE 1 TABLET BY MOUTH EVERY 8 HOURS AS NEEDED    IMIPRAMINE (TOFRANIL) 25 MG TABLET    Take 25 mg by mouth nightly. INSULIN GLARGINE (LANTUS U-100 INSULIN) 100 UNIT/ML INJECTION    80 Units by SubCUTAneous route nightly.     ISOSORBIDE MONONITRATE ER (IMDUR) 30 MG TABLET LIPASE-PROTEASE-AMYLASE (CREON) 12,000-38,000 -60,000 UNIT CAPSULE    Take 2 Caps by mouth three (3) times daily (with meals). LISINOPRIL (PRINIVIL, ZESTRIL) 20 MG TABLET    Take 20 mg by mouth daily. MICROLET LANCET MISC    USE AS DIRECTED TO TEST BLOOD GLUCOSE THREE TIMES DAILY    NARCAN 4 MG/ACTUATION NASAL SPRAY    INSERT 1 SPRAY BY INTRANASAL ROUTE AS DIRECTED FOR OPIATE OVERDOSE/REVERSAL. MAY REPEAT IN 2-3 MINS IF NEEDED. NIFEDIPINE ER (ADALAT CC) 30 MG ER TABLET        OMEPRAZOLE (PRILOSEC) 40 MG CAPSULE        ONDANSETRON (ZOFRAN ODT) 4 MG DISINTEGRATING TABLET        OTHER,NON-FORMULARY,    Please collect urine culture and sensitivity upon completion of Cipro. PROMETHAZINE (PHENERGAN) 25 MG TABLET        PROMETHAZINE (PROMETHEGAN) 25 MG SUPPOSITORY    Promethegan 25 mg rectal suppository   UNWRAP AND INSERT 1 SUPPOSITORY RECTALLY EVERY 6 HOURS AS NEEDED FOR NAUSEA OR VOMITING    SCOPOLAMINE (TRANSDERM-SCOP) 1 MG OVER 3 DAYS PT3D    1 Patch by TransDERmal route every seventy-two (72) hours.  Prn nausea    SENNA-DOCUSATE (SENOKOT-S) 8.6-50 MG PER TABLET    1 tablet in the evening as needed    SEVELAMER CARBONATE (RENVELA) 2.4 GRAM PWPK ORAL POWDER    VIGOROUSLY MIX CONTENTS OF 1 PACKET IN WATER (IT WILL NOT DISSOLVE) AND DRINK 3 TIMES DAILY WITH MEALS   These Medications have changed    No medications on file   Stop Taking    No medications on file     _______________________________    Notes:    Janine Mcnamara MD using Dragon dictation      _______________________________

## 2022-05-09 NOTE — PROGRESS NOTES
Problem: Pressure Injury - Risk of  Goal: *Prevention of pressure injury  Description: Document Trevor Scale and appropriate interventions in the flowsheet. Outcome: Progressing Towards Goal  Note: Pressure Injury Interventions:  Sensory Interventions: Assess changes in LOC,Avoid rigorous massage over bony prominences,Check visual cues for pain,Float heels,Keep linens dry and wrinkle-free,Maintain/enhance activity level,Minimize linen layers,Monitor skin under medical devices,Turn and reposition approx. every two hours (pillows and wedges if needed)    Moisture Interventions: Absorbent underpads,Apply protective barrier, creams and emollients,Internal/External urinary devices,Check for incontinence Q2 hours and as needed    Activity Interventions: PT/OT evaluation,Increase time out of bed    Mobility Interventions: Turn and reposition approx. every two hours(pillow and wedges)    Nutrition Interventions: Discuss nutritional consult with provider    Friction and Shear Interventions: Apply protective barrier, creams and emollients,Foam dressings/transparent film/skin sealants,Lift sheet,Lift team/patient mobility team                Problem: Patient Education: Go to Patient Education Activity  Goal: Patient/Family Education  Outcome: Progressing Towards Goal     Problem: Falls - Risk of  Goal: *Absence of Falls  Description: Document Robert Sites Fall Risk and appropriate interventions in the flowsheet.   Outcome: Progressing Towards Goal  Note: Fall Risk Interventions:       Mentation Interventions: Bed/chair exit alarm,Adequate sleep, hydration, pain control,Door open when patient unattended    Medication Interventions: Bed/chair exit alarm    Elimination Interventions: Bed/chair exit alarm,Call light in reach,Toileting schedule/hourly rounds    History of Falls Interventions: Bed/chair exit alarm,Door open when patient unattended         Problem: Patient Education: Go to Patient Education Activity  Goal: Patient/Family Education  Outcome: Progressing Towards Goal     Problem: Pain  Goal: *Control of Pain  Outcome: Progressing Towards Goal

## 2022-05-09 NOTE — ED TRIAGE NOTES
Patient to ED with wife complaint of patient being altered since yesterday after dialysis. Patient responsive to name and touch. EMS to ED and placed in bed 10.  Patient edeamatous from waist down

## 2022-05-09 NOTE — PROGRESS NOTES
MRI Screening form needs to be filled out and faxed to [address] MRI can be scheduled. If unable to obtain information from pt, [employer] needs to be contacted.  If pt is claustro or will need pain meds, please have ordered in advance in order to facilitate exam.

## 2022-05-09 NOTE — PROGRESS NOTES
Problem: Pressure Injury - Risk of  Goal: *Prevention of pressure injury  Description: Document Trevor Scale and appropriate interventions in the flowsheet. 5/9/2022 [id number] by Abiel Mack RN  Outcome: Progressing Towards Goal  Note: Pressure Injury Interventions:  Sensory Interventions: Assess changes in LOC,Discuss PT/OT consult with provider,Pressure redistribution bed/mattress (bed type),Turn and reposition approx. every two hours (pillows and wedges if needed)    Moisture Interventions: Apply protective barrier, creams and emollients    Activity Interventions: PT/OT evaluation    Mobility Interventions: Turn and reposition approx. every two hours(pillow and wedges)    Nutrition Interventions: Document food/fluid/supplement intake    Friction and Shear Interventions: Apply protective barrier, creams and emollients,Transferring/repositioning devices             5/9/2022 [id number] by Abiel Mack RN  Outcome: Progressing Towards Goal  Note: Pressure Injury Interventions:  Sensory Interventions: Assess changes in LOC,Discuss PT/OT consult with provider,Pressure redistribution bed/mattress (bed type),Turn and reposition approx. every two hours (pillows and wedges if needed)    Moisture Interventions: Apply protective barrier, creams and emollients    Activity Interventions: PT/OT evaluation    Mobility Interventions: Turn and reposition approx. every two hours(pillow and wedges)    Nutrition Interventions: Document food/fluid/supplement intake    Friction and Shear Interventions: Apply protective barrier, creams and emollients,Transferring/repositioning devices                Problem: Falls - Risk of  Goal: *Absence of Falls  Description: Document Keisha Fall Risk and appropriate interventions in the flowsheet.   5/9/2022 6170 by Abiel Mack RN  Outcome: Progressing Towards Goal  Note: Fall Risk Interventions:            Medication Interventions: Bed/chair exit alarm,Patient to call before getting OOB    Elimination Interventions: Bed/chair exit alarm,Call light in reach    History of Falls Interventions: Bed/chair exit alarm      5/9/2022 2389 by Garett Salguero RN  Outcome: Progressing Towards Goal  Note: Fall Risk Interventions:            Medication Interventions: Bed/chair exit alarm,Patient to call before getting OOB    Elimination Interventions: Bed/chair exit alarm,Call light in reach    History of Falls Interventions: Bed/chair exit alarm         Problem: Pain  Goal: *Control of Pain  Outcome: Progressing Towards Goal

## 2022-05-09 NOTE — ED NOTES
Wife Leo Trent states patient has not been coherent since yesterday. Home dialysis lasted 4 hours completed treatment. Tough to arouse. Did not take night meds, did not eat or drink today This morning standing at front door with roller like he was lost. Slept most of the day. Fluid/edema +3 pitting from thighs down. Produces urine maybe once a day. Attempted to place condom cath, unsuccessful. CT done and results pending.

## 2022-05-09 NOTE — CONSULTS
46year old male with history of multiple medical problems brought to hospital with mental status changes, patient on dialysis wife reported after recent dialysis patient lethargic and sleeping not eating drinking for a day had two episodes of fecal incontinence with worsening confusion, patient wife report confusion started in march with waxing and waning mental status but worse recently, had fluid overload, elevated blood pressure and ammonia level as well likely contributing to change in mental status. Patient very sleepy, not cooperate with exam, knows his name and followed few commands, able to move right lower and both upper extremity to some extent, wife noted limited left lower extremity movement for six months, myoclonic activity noted, no seizure reported. CT brain was unremarkable pending MRI brain result. PMHX:  As above          Social History     Socioeconomic History    Marital status:      Spouse name: Not on file    Number of children: Not on file    Years of education: Not on file    Highest education level: Not on file   Occupational History    Not on file   Tobacco Use    Smoking status: Never Smoker    Smokeless tobacco: Never Used   Vaping Use    Vaping Use: Never used   Substance and Sexual Activity    Alcohol use: No    Drug use: Never    Sexual activity: Not on file   Other Topics Concern    Not on file   Social History Narrative    Not on file     Social Determinants of Health     Financial Resource Strain:     Difficulty of Paying Living Expenses: Not on file   Food Insecurity:     Worried About Running Out of Food in the Last Year: Not on file    Yanet of Food in the Last Year: Not on file   Transportation Needs:     Lack of Transportation (Medical): Not on file    Lack of Transportation (Non-Medical):  Not on file   Physical Activity:     Days of Exercise per Week: Not on file    Minutes of Exercise per Session: Not on file   Stress:     Feeling of Stress : Not on file   Social Connections:     Frequency of Communication with Friends and Family: Not on file    Frequency of Social Gatherings with Friends and Family: Not on file    Attends Anabaptism Services: Not on file    Active Member of Clubs or Organizations: Not on file    Attends Club or Organization Meetings: Not on file    Marital Status: Not on file   Intimate Partner Violence:     Fear of Current or Ex-Partner: Not on file    Emotionally Abused: Not on file    Physically Abused: Not on file    Sexually Abused: Not on file   Housing Stability:     Unable to Pay for Housing in the Last Year: Not on file    Number of [place] in the Last Year: Not on file    Unstable Housing in the Last Year: Not on file       No family history on file.      Current Facility-Administered Medications   Medication Dose Route Frequency Provider Last Rate Last Admin    sodium chloride (NS) flush 5-40 mL  5-40 mL IntraVENous Q8H Zeinab Roberts MD   10 mL at 05/09/22 0507    sodium chloride (NS) flush 5-40 mL  5-40 mL IntraVENous PRN Angelica Marino MD        acetaminophen (TYLENOL) tablet 650 mg  650 mg Oral Q6H PRN Angelica Marino MD        Or    acetaminophen (TYLENOL) suppository 650 mg  650 mg Rectal Q6H PRN Sandra Roberts MD        polyethylene glycol (MIRALAX) packet 17 g  17 g Oral DAILY PRN Angelica Marino MD        ondansetron [facility]) injection 4 mg  4 mg IntraVENous Q6H PRN Angelica GARCIA MD   4 mg at 05/09/22 0611    heparin (porcine) injection 5,000 Units  5,000 Units SubCUTAneous Q12H Janna Roberts MD   5,000 Units at 05/09/22 0459    hydrALAZINE (APRESOLINE) 20 mg/mL injection 20 mg  20 mg IntraVENous Q4H PRN Janna Roberts MD   20 mg at 05/09/22 1845    glucose chewable tablet 16 g  16 g Oral PRN Angelica Marino MD        glucagon (GLUCAGEN) injection 1 mg  1 mg IntraMUSCular PRN Alejandra Roman Montana MD        dextrose 10% infusion 0-250 mL  0-250 mL IntraVENous SHAHEEDN Roman Roberts MD        dextrose 5% infusion  50 mL/hr IntraVENous CONTINUOUS Bichu, Apryl Harper MD 50 mL/hr at 05/09/22 1837 50 mL/hr at 05/09/22 1837    insulin lispro (HUMALOG) injection   SubCUTAneous Q6H Kirsty Krueger MD           Past Medical History:   Diagnosis Date    Cholecystitis     Chronic kidney disease     on , on Home HD    [facility]     High cholesterol     Hypertension     no meds now    Nausea & vomiting     pt needs Scopolamine    Neuropathy        Past Surgical History:   Procedure Laterality Date    COLONOSCOPY N/A 12/31/2020    COLONOSCOPY performed by Meagan Zelaya MD at [address] HX CHOLECYSTECTOMY      HX HERNIA REPAIR      HX ORTHOPAEDIC      jaw surgery    HX ORTHOPAEDIC      left foot    HX WISDOM TEETH EXTRACTION      NEUROLOGICAL PROCEDURE UNLISTED  2020    Lumbar fusion    VASCULAR SURGERY PROCEDURE UNLIST  06/2020    Left Arm AV Fistula       Allergies   Allergen Reactions    Opioids - Morphine Analogues Swelling     \"stops my body from recovering\"      Amitriptyline Other (comments)     Myalgia noted [facility] records  GI Distress      Amoxicillin Nausea and Vomiting    Calcium Other (comments)     \"doesn't metabolize in my system quick enough by the time I take the next dose\"    Calcium Channel Blocking Agent Diltiazem Analogues Other (comments)    Canagliflozin Other (comments)    Empagliflozin Other (comments)    Gabapentin Nausea and Vomiting and Other (comments)     Urinary retention    Metformin Nausea and Vomiting    Nifedipine Other (comments)    Oxycodone Hcl-Oxycodone-Asa Other (comments)     \"doesn't metabolize in my system quick enough by the time I take the next dose\"  Gi Distress         Patient Active Problem List   Diagnosis Code    Acute pancreatitis K85.90    Type 2 diabetes mellitus with hyperglycemia ([facility] [address] E11.65    [facility] N17.9    Elevated serum alkaline phosphatase level R74.8    Hyponatremia E87.1    Encephalopathy G93.40    Acute metabolic encephalopathy M82.93         Review of Systems:   As above      PHYSICAL EXAMINATION:      VITAL SIGNS:    Visit Vitals  BP (!) 170/84   Pulse 83   Temp 98.9 °F (37.2 °C)   Resp 16   Wt 110.2 kg (242 lb 15.2 oz)   SpO2 93%   BMI 33.88 kg/m²       GENERAL: The patient is in no apparent distress. l. HEAD:   Ear, nose, and throat appear to be without trauma. The patient is normocephalic. NEUROLOGIC EXAMINATION  Mental status: patient is sleepy not cooperative with exam, follow few commands, know his name, limited speech. CN: Not cooperate, grimace to pain, gaze conjugate resist eye opening, mild facial asymmetry. Motor: move upper extremities, poor effort not against the gravity, increased tone, left lower no movement but right side withdraw to pain. Sensory: as above. Coordination: FNF, HS accurate w/o dysmetria  DTR: depressed, plantar equivocal.  Gait: not tested       Impression:  46year old male with history of multiple medical problems brought to hospital with mental status changes, patient on dialysis wife reported after recent dialysis patient lethargic and sleeping not eating drinking for a day had two episodes of fecal incontinence with worsening confusion, patient wife report confusion started in march with waxing and waning mental status but worse recently, had fluid overload, elevated blood pressure and ammonia level as well likely contributing to change in mental status. Patient very sleepy, not cooperate with exam, knows his name and followed few commands, able to move right lower and both upper extremity to some extent, wife noted limited left lower extremity movement for six months, myoclonic activity noted, no seizure reported. CT brain was unremarkable pending MRI brain result. ENCEPHALOPATHY METABOLIC VS VASCULAR ETIOLOGY.   Will follow up on mri brain, get EEG. Check TSH and B 12, Carotid dopplers. DVT prophylaxis, PT and OT. Will follow up on exam.     Plan:  EEG and MRI BRAIN. I spent 30 minutes with the patient in face-to-face consultation, of which greater than 50% was spent in counseling and coordination of care as described above. PLEASE NOTE:   This document has been produced using voice recognition software. Unrecognized errors in transcription may be present.

## 2022-05-09 NOTE — CONSULTS
Consult Note      Consult requested by: Anastasia Jiménez MD    ADMIT DATE: 5/8/2022    CONSULT DATE: May 9, 2022           Admission diagnosis: Encephalopathy   Reason for Nephrology Consultation: Altered mental status , ESRD        Assessment and plan    #1 End-stage renal disease on home hemodialysis,followed by Dr. Ilya Leyva at [facility], Home unit. Patient gets home hemodialysis 5 days a week, his last dialysis was on Saturday for 3.5 hours 3.5 L UF. He appears hypertensive and slightly overloaded. He had does have altered mental status, BUN elevated at 55 and creatinine of 6.97. Will need dialysis daily  #2 altered mental status, metabolic encephalopathy, unclear etiology, uremia could be contributing but per wife patient had been compliant with his dialysis 5 days a week. His BUN/creatinine do not look too bad, although with his poor nutritional status may still be contributing to altered mental status. Patient did have some incontinence of urine and stool with his confusion, wonder if he had an episode of seizures? Previous episode of altered mental status at [facility] was associated with urosepsis, sepsis needs to be ruled out. High ammonia could be from? Seizure or ?undiagnosed liver condition. PRES syndrome may be possibility secondary to severe uncontrolled hypertension. #3 hypertension, uncontrolled  #4 diabetes mellitus  #5 hyperkalemia improved  #6 secondary hyperparathyroidism, check PTH and phosphorus      Plan:    #1 Daily hemodialysis to see if it improves his mental status, discussed with dialysis nursing  #2 evaluate for sepsis, check UA  #3 recommend consulting neurology for altered mental status  #4 check PTH, phosphorus  #5 no need of RIGOBERTO as hemoglobin at goal  #6 patient's blood pressure should improve with ultrafiltration, goal would be around 140s to 160s in acute setting, this would be an improvement by 25% initially.   Okay to start his p.o. meds to achieve that goal, defer to primary team  #7 patient NPO , so ok with gentle maintenance with d5w @ 50 cc/hrs for glucose source    Plan discussed with Dr. 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 with altered mental status also hypertension, history of nausea vomiting presented with progressive mental status changes. Per wife he had had waxing and waning mental status changes for 2 months. He was recently discharged from rehab. He has had regular hemodialysis at home 5 days a week. Per wife patient had worsening lethargy. He was found sitting down near the door with evidence of fecal incontinence. He was extremely confused. His wife took him to the bathroom and he had another episode of fecal and urinary incontinence. Last dialysis was Saturday for 3-1/2 hours with 3.5 L UF    On presentation patient was hypertensive, normothermic saturating 100% on room air. Patient had a normal white count, hemoglobin of 11.1, platelet count of 206O. Patient's labs suggest sodium of 135, potassium of 4.8 BUN of 55 and creatinine of 6.97. Albumin of 2.4 lactic acid of 2.1, hemoglobin A1c of 5.6, ammonia of 131. CT abdomen was done which was negative for any acute intracranial findings. Did show microvascular ischemic changes.      Past Medical History:   Diagnosis Date    Cholecystitis     Chronic kidney disease     on , on Home HD    Diabetes ([facility] [address] [facility]     High cholesterol     Hypertension     no meds now    Nausea & vomiting     pt needs Scopolamine    Neuropathy       Past Surgical History:   Procedure Laterality Date    COLONOSCOPY N/A 12/31/2020    COLONOSCOPY performed by Iron Irby MD at [address] HX CHOLECYSTECTOMY      [address]      HX ORTHOPAEDIC      jaw surgery    HX ORTHOPAEDIC      left foot    HX WISDOM TEETH EXTRACTION      NEUROLOGICAL PROCEDURE UNLISTED  2020    Lumbar fusion    VASCULAR SURGERY PROCEDURE UNLIST  06/2020    Left Arm AV Fistula       Social History     Socioeconomic History    Marital status:      Spouse name: Not on file    Number of children: Not on file    Years of education: Not on file    Highest education level: Not on file   Occupational History    Not on file   Tobacco Use    Smoking status: Never Smoker    Smokeless tobacco: Never Used   Vaping Use    Vaping Use: Never used   Substance and Sexual Activity    Alcohol use: No    Drug use: Never    Sexual activity: Not on file   Other Topics Concern    Not on file   Social History Narrative    Not on file     Social Determinants of Health     Financial Resource Strain:     Difficulty of Paying Living Expenses: Not on file   Food Insecurity:     Worried About Running Out of Food in the Last Year: Not on file    Yanet of Food in the Last Year: Not on file   Transportation Needs:     Lack of Transportation (Medical): Not on file    Lack of Transportation (Non-Medical):  Not on file   Physical Activity:     Days of Exercise per Week: Not on file    Minutes of Exercise per Session: Not on file   Stress:     Feeling of Stress : Not on file   Social Connections:     Frequency of Communication with Friends and Family: Not on file    Frequency of Social Gatherings with Friends and Family: Not on file    Attends Anglican Services: Not on file    Active Member of Clubs or Organizations: Not on file    Attends Club or Organization Meetings: Not on file    Marital Status: Not on file   Intimate Partner Violence:     Fear of Current or Ex-Partner: Not on file    Emotionally Abused: Not on file    Physically Abused: Not on file    Sexually Abused: Not on file   Housing Stability:     Unable to Pay for Housing in the Last Year: Not on file    Number of Places Lived in the Last Year: Not on file    Unstable Housing in the Last Year: Not on file       No family history on file. Allergies   Allergen Reactions    Opioids - Morphine Analogues Swelling     \"stops my body from recovering\"      Amitriptyline Other (comments)     Myalgia noted [facility] records  GI Distress      Amoxicillin Nausea and Vomiting    Calcium Other (comments)     \"doesn't metabolize in my system quick enough by the time I take the next dose\"    Calcium Channel Blocking Agent Diltiazem Analogues Other (comments)    Canagliflozin Other (comments)    Empagliflozin Other (comments)    Gabapentin Nausea and Vomiting and Other (comments)     Urinary retention    Metformin Nausea and Vomiting    Nifedipine Other (comments)    Oxycodone Hcl-Oxycodone-Asa Other (comments)     \"doesn't metabolize in my system quick enough by the time I take the next dose\"  Gi Distress          Home Medications:     Medications Prior to Admission   Medication Sig    sevelamer carbonate (RENVELA) 800 mg tab tab Take 800 mg by mouth three (3) times daily.  hydrALAZINE (APRESOLINE) 25 mg tablet Take 25 mg by mouth three (3) times daily.  folic acid/vit B complex and C (DIALYVITE PO) Take  by mouth daily.  dicyclomine (BENTYL) 20 mg tablet TAKE 1 TABLET BY MOUTH THREE TIMES DAILY AS NEEDED FOR CONSTIPATION OR CRAMPING    HYDROmorphone (DILAUDID) 2 mg tablet TAKE 1 TABLET BY MOUTH EVERY 8 HOURS AS NEEDED    Microlet Lancet misc USE AS DIRECTED TO TEST BLOOD GLUCOSE THREE TIMES DAILY    ondansetron (ZOFRAN ODT) 4 mg disintegrating tablet     OTHER,NON-FORMULARY, Please collect urine culture and sensitivity upon completion of Cipro.     promethazine (PHENERGAN) 25 mg tablet  (Patient not taking: Reported on 5/9/2022)    NIFEdipine ER (ADALAT CC) 30 mg ER tablet     ezetimibe (ZETIA) 10 mg tablet     ciprofloxacin HCl (CIPRO) 500 mg tablet  (Patient not taking: Reported on 5/9/2022)    carvediloL (COREG) 6.25 mg tablet     promethazine (Promethegan) 25 mg suppository Promethegan 25 mg rectal suppository   UNWRAP AND INSERT 1 SUPPOSITORY RECTALLY EVERY 6 HOURS AS NEEDED FOR NAUSEA OR VOMITING (Patient not taking: Reported on 5/9/2022)    docusate sodium (COLACE) 100 mg capsule     bumetanide (BUMEX) 2 mg tablet     omeprazole (PRILOSEC) 40 mg capsule     isosorbide mononitrate ER (IMDUR) 30 mg tablet     imipramine (TOFRANIL) 25 mg tablet Take 25 mg by mouth nightly.  Narcan 4 mg/actuation nasal spray INSERT 1 SPRAY BY INTRANASAL ROUTE AS DIRECTED FOR OPIATE OVERDOSE/REVERSAL. MAY REPEAT IN 2-3 MINS IF NEEDED.  senna-docusate (Senokot-S) 8.6-50 mg per tablet 1 tablet in the evening as needed (Patient not taking: Reported on 5/9/2022)    scopolamine (TRANSDERM-SCOP) 1 mg over 3 days pt3d 1 Patch by TransDERmal route every seventy-two (72) hours. Prn nausea    lisinopriL (PRINIVIL, ZESTRIL) 20 mg tablet Take 20 mg by mouth daily. (Patient not taking: Reported on 5/9/2022)    lipase-protease-amylase (Creon) 12,000-38,000 -60,000 unit capsule Take 2 Caps by mouth three (3) times daily (with meals). (Patient not taking: Reported on 5/9/2022)    b complex-vitamin c-folic acid (Dialyvite) 100-1 mg tab tablet Take 1 Tab by mouth daily.  insulin glargine (LANTUS U-100 INSULIN) 100 unit/mL injection 80 Units by SubCUTAneous route nightly.        Current Inpatient Medications:     Current Facility-Administered Medications   Medication Dose Route Frequency    sodium chloride (NS) flush 5-40 mL  5-40 mL IntraVENous Q8H    sodium chloride (NS) flush 5-40 mL  5-40 mL IntraVENous PRN    acetaminophen (TYLENOL) tablet 650 mg  650 mg Oral Q6H PRN    Or    acetaminophen (TYLENOL) suppository 650 mg  650 mg Rectal Q6H PRN    polyethylene glycol (MIRALAX) packet 17 g  17 g Oral DAILY PRN    ondansetron (ZOFRAN) injection 4 mg  4 mg IntraVENous Q6H PRN    heparin (porcine) injection 5,000 Units  5,000 Units SubCUTAneous Q12H    hydrALAZINE (APRESOLINE) 20 mg/mL injection 20 mg  20 mg IntraVENous Q4H PRN    lactulose (CHRONULAC) 10 gram/15 mL 150 mL in sterile water irrigation 350 mL rectal enema  500 mL Rectal Q6H    insulin lispro (HUMALOG) injection   SubCUTAneous Q4H    glucose chewable tablet 16 g  16 g Oral PRN    glucagon (GLUCAGEN) injection 1 mg  1 mg IntraMUSCular PRN    dextrose 10% infusion 0-250 mL  0-250 mL IntraVENous PRN    dextrose 5% infusion  75 mL/hr IntraVENous CONTINUOUS       Review of Systems:   No fever or chills. No sore throat. No cough or hemoptysis. No orthopnea or paroxysmal nocturnal dyspnea. no joint paints. No muscle aches. No skin changes. No dizziness or lightheadedness. No headaches. Physical Assessment:     Vitals:    05/09/22 0519 05/09/22 0823 05/09/22 1129 05/09/22 1155   BP: (!) 150/74 (!) 165/87 (!) 180/97    Pulse: 79 80 78    Resp: 16 16 17    Temp: 98.3 °F (36.8 °C) 97.5 °F (36.4 °C) 98.1 °F (36.7 °C)    SpO2: 96% 99% 95%    Weight:    110.2 kg (242 lb 15.2 oz)     Last 3 Recorded Weights in this Encounter    05/09/22 1155   Weight: 110.2 kg (242 lb 15.2 oz)     Admission weight: Weight: 110.2 kg (242 lb 15.2 oz) (05/09/22 1155)    No intake or output data in the 24 hours ending 05/09/22 1317    Sleepy, wakes up but unable to answer any questions   HEENT: mmm  Neck: no cervical lymphadenopathy or thyromegaly. Lungs: good air entry, clear to auscultation bilaterally. Cardiovascular system: S1, S2, regular rate and rhythm. Abdomen: soft, non tender, non distended.   Extremities: 1+ le edema      Data Review:    Labs: Results:       Chemistry Recent Labs     05/09/22  0340 05/08/22 2055   * 87   * 134*   K 4.8 5.7*    98*   CO2 21 24   BUN 55* 52*   CREA 6.97* 6.35*   CA 9.0 8.8   AGAP 14 12   BUCR 8* 8*   * 186*   TP 6.5 7.0   ALB 2.4* 2.6*   GLOB 4.1* 4.4*   AGRAT 0.6* 0.6*         CBC w/Diff Recent Labs     05/09/22  0340 05/08/22 2055   WBC 7.7 7.0   RBC 3.77* 4.32*   HGB 11.1* 12.6*   HCT 33.3* 37.8    189   GRANS 74* 67   LYMPH 10* 14*   EOS 1 1         Iron/Ferritin No results for input(s): IRON in the last 72 hours. No lab exists for component: TIBCCALC   PTH/VIT D No results for input(s): PTH in the last 72 hours.     No lab exists for component: VITD           Yoli Jimenez MD  5/9/2022  1:17 PM      May 9, 2022

## 2022-05-09 NOTE — PROGRESS NOTES
INTERVENTION:  HEMODYNAMIC STABILIZATION  MAINTAIN BP WNL WHILE ON HD. INTERVENTION:  FLUID MANAGEMENT  WILL ATTEMPT 3500 ML TOTAL FLUID REMOVAL AS TOLERATED. INTERVENTION:  METABOLIC/ELECTROLYTE MANAGEMENT  2.0 POTASSIUM 2.5 CALCIUM DIALYSATE USED WITH HD TODAY. INTERVENTION:  HEMODIALYSIS ACCESS SITE MANAGEMENT  LEFT UPPER ARM AVF W/15G NEEDLE ACCESSED USING ASEPTIC TECHNIQUE. GOAL:  SIGNS AND SYMPTOMS OF LISTED POTENTIAL PROBLEMS WILL BE ABSENT OR MANAGEABLE. OUTCOME:  PROGRESSING. HD PLANNED FOR 3.5 HOURS TODAY.

## 2022-05-09 NOTE — DIABETES MGMT
Diabetes/ Glycemic Control Plan of Care    Patient was admitted on 5/08/2022 with report of progressive worsening of mental status. He was recently discharged from [facility] rehab. Recommendations:   1.) correctional lispro insulin as ordered. 5/09/2022: Fasting BG within target range this morning. Assessment:   DX:   1. Acute encephalopathy     2. End stage renal disease on dialysis [facility])        Fasting/ Morning blood glucose:   Lab Results   Component Value Date/Time    Glucose 104 (H) 05/09/2022 03:40 AM    Glucose (POC) 122 (H) 05/09/2022 11:31 AM     IV Fluids containing dextrose: None    Steroids:   None    Blood glucose values: Within target range (70-180mg/dL): Yes    Current insulin orders:   Correctional lispro insulin. Normal sensitivity dose    Total Daily Dose previous 24 hours: None    Current A1c:   Lab Results   Component Value Date/Time    Hemoglobin A1c 5.6 05/09/2022 03:40 AM      equivalent  to ave Blood Glucose of 114 mg/dl for 2-3 months prior to admission    Adequate glycemic control PTA: Yes. Nutrition/Diet:   Active Orders   Diet    DIET NPO      Meal Intake:  No data found. Supplement Intake:  No data found. Home diabetes medications: Pending verification  Key Antihyperglycemic Medications             insulin glargine (LANTUS U-100 INSULIN) 100 unit/mL injection 80 Units by SubCUTAneous route nightly.           Plan/Goals:   Blood glucose will be within target of 70 - 180 mg/dl within 72 hours      Education:  [] Refer to Diabetes Education Record                       [x] Education not indicated at this time     Hope Farooq RN [facility]  Pager: [phone]

## 2022-05-09 NOTE — PROGRESS NOTES
Bedside shift change report given to PRASANTH Salas (oncoming nurse) by Sandra Santacruz RN (offgoing nurse). Report included the following information SBAR, Kardex, MAR and Recent Results.

## 2022-05-09 NOTE — H&P
History & Physical    Patient: Crow Rand MRN: [id number]  CSN: [id number]    YOB: 1970  Age: 46 y.o. Sex: male      DOA: 5/8/2022    Chief Complaint:   Chief Complaint   Patient presents with    Altered mental status          HPI:     Crow Rand is a 46 y.o. male who  has a past medical history of Cholecystitis, Chronic kidney disease, Diabetes ([address] High cholesterol, Hypertension, Nausea & vomiting, and Neuropathy. .  Who has had progressive worsening of mental status since yesterday. Patient has had waxing and waning mental status for the past 2 months. He was admitted in early April  at a [facility] and discharged to a rehabilitation facility as his mental status was quite depressed. He has not returned to baseline since although he has some very good days when he is very sharp. He normally accesses his fistula for dialysis 5 days a week. Yesterday his wife had to prompt him several times before he was able to access the fistula. He was fast asleep throughout dialysis. His blood pressure was slightly elevated before dialysis and after dialysis was reasonably well controlled. However he remained extremely lethargic and his wife left him on the machine for 2 hours without drawing any more fluid. She marques a total of 3.8 L of fluid but noticed he still had significant peripheral edema as well as abdominal edema after dialysis. She had last given him all his medications including imipramine yesterday. When his wife instructed him to sit down he kept insisting that he was sitting down when in fact he was not. Later on during the day his daughter found him near the door with evidence of fecal incontinence. His wife surmises that he probably needed to use the restroom and managed to get out of bed and moved towards the door with his rolling walker. This was in fact in the opposite direction compared to the bathroom.   He was extremely confused and kept rolling the walker against the wall, to the point that he developed bleeding of his left big toe because he could not maneuver himself to the bathroom. Then his wife helped him to the bathroom and asked him to sit down on the commode-at this point patient kept insisting that he was sitting down when in fact he was slightly bent over. He had another episode of fecal incontinence associated with urinary incontinence. No seizures. No fever. He has intermittent nausea and vomiting. Last episode of vomiting was 2 days back and his wife does not believe he aspirated at the time. No overt bleeding. ED Course:     Past Medical History:   Diagnosis Date    Cholecystitis     Chronic kidney disease     on , on Home HD    Diabetes ([address]     High cholesterol     Hypertension     no meds now    Nausea & vomiting     pt needs Scopolamine    Neuropathy        Past Surgical History:   Procedure Laterality Date    COLONOSCOPY N/A 12/31/2020    COLONOSCOPY performed by Albert Redmond MD at [address] HX CHOLECYSTECTOMY      HX HERNIA REPAIR      HX ORTHOPAEDIC      jaw surgery    HX ORTHOPAEDIC      left foot    HX WISDOM TEETH EXTRACTION      NEUROLOGICAL PROCEDURE UNLISTED  2020    Lumbar fusion    VASCULAR SURGERY PROCEDURE UNLIST  06/2020    Left Arm AV Fistula       No family history on file. Social History     Socioeconomic History    Marital status:    Tobacco Use    Smoking status: Never Smoker    Smokeless tobacco: Never Used   Vaping Use    Vaping Use: Never used   Substance and Sexual Activity    Alcohol use: No    Drug use: Never       Prior to Admission medications    Medication Sig Start Date End Date Taking? Authorizing Provider   sevelamer carbonate (RENVELA) 800 mg tab tab Take 800 mg by mouth three (3) times daily. Yes Other, MD Joseline   hydrALAZINE (APRESOLINE) 25 mg tablet Take 25 mg by mouth three (3) times daily.    Yes Other, MD Joseline   folic acid/vit B complex and C (DIALYVITE PO) Take  by mouth daily. Yes Other, MD Joseline   OTHER,NON-FORMULARY, Please collect urine culture and sensitivity upon completion of Cipro. 3/22/22   [place], Karla Lucero NP   promethazine (PHENERGAN) 25 mg tablet  3/2/22   Provider, Historical   NIFEdipine ER (ADALAT CC) 30 mg ER tablet  3/2/22   Provider, Historical   ezetimibe (ZETIA) 10 mg tablet  3/2/22   Provider, Historical   ciprofloxacin HCl (CIPRO) 500 mg tablet  3/2/22   Provider, Historical   carvediloL (COREG) 6.25 mg tablet  3/2/22   Provider, Historical   promethazine (Promethegan) 25 mg suppository Promethegan 25 mg rectal suppository   UNWRAP AND INSERT 1 SUPPOSITORY RECTALLY EVERY 6 HOURS AS NEEDED FOR NAUSEA OR VOMITING    Provider, Historical   docusate sodium (COLACE) 100 mg capsule  3/2/22   Provider, Historical   bumetanide (BUMEX) 2 mg tablet  3/2/22   Provider, Historical   omeprazole (PRILOSEC) 40 mg capsule  3/2/22   Provider, Historical   isosorbide mononitrate ER (IMDUR) 30 mg tablet  3/2/22   Provider, Historical   dicyclomine (BENTYL) 20 mg tablet TAKE 1 TABLET BY MOUTH THREE TIMES DAILY AS NEEDED FOR CONSTIPATION OR CRAMPING 11/29/21   Provider, Historical   HYDROmorphone (DILAUDID) 2 mg tablet TAKE 1 TABLET BY MOUTH EVERY 8 HOURS AS NEEDED 12/16/21   Provider, Historical   imipramine (TOFRANIL) 25 mg tablet Take 25 mg by mouth nightly. Provider, Historical   Microlet Lancet misc USE AS DIRECTED TO TEST BLOOD GLUCOSE THREE TIMES DAILY 9/26/21   Provider, Historical   Narcan 4 mg/actuation nasal spray INSERT 1 SPRAY BY INTRANASAL ROUTE AS DIRECTED FOR OPIATE OVERDOSE/REVERSAL.  MAY REPEAT IN 2-3 MINS IF NEEDED. 11/29/21   Provider, Historical   ondansetron (ZOFRAN ODT) 4 mg disintegrating tablet  12/20/21   Provider, Historical   senna-docusate (Senokot-S) 8.6-50 mg per tablet 1 tablet in the evening as needed 11/14/21   Provider, Historical   scopolamine (TRANSDERM-SCOP) 1 mg over 3 days pt3d 1 Patch by TransDERmal route every seventy-two (72) hours. Prn nausea 21   Coy Garay MD   lisinopriL (PRINIVIL, ZESTRIL) 20 mg tablet Take 20 mg by mouth daily. Provider, Historical   lipase-protease-amylase (Creon) 12,000-38,000 -60,000 unit capsule Take 2 Caps by mouth three (3) times daily (with meals). Provider, Historical   b complex-vitamin c-folic acid (Dialyvite) 100-1 mg tab tablet Take 1 Tab by mouth daily. Provider, Historical   insulin glargine (LANTUS U-100 INSULIN) 100 unit/mL injection 80 Units by SubCUTAneous route nightly. Other, MD Joseline       Allergies   Allergen Reactions    Opioids - Morphine Analogues Swelling     \"stops my body from recovering\"      Amitriptyline Other (comments)     Myalgia noted \[facility]\"" records  GI Distress      Amoxicillin Nausea and Vomiting    Calcium Other (comments)     \"doesn't metabolize in my system quick enough by the time I take the next dose\"    Calcium Channel Blocking Agent Diltiazem Analogues Other (comments)    Canagliflozin Other (comments)    Empagliflozin Other (comments)    Gabapentin Nausea and Vomiting and Other (comments)     Urinary retention    Metformin Nausea and Vomiting    Nifedipine Other (comments)    Oxycodone Hcl-Oxycodone-Asa Other (comments)     \"doesn't metabolize in my system quick enough by the time I take the next dose\"  Gi Distress           Review of Systems  14 point Review of Systems, otherwise negative. Physical Exam:       Visit Vitals  BP (!) 188/92 (BP 1 Location: Right upper arm, BP Patient Position: Lying)   Pulse 75   Temp 97.5 °F (36.4 °C)   Resp 12   SpO2 100%      O2 Device: None (Room air)    Temp (24hrs), Av.7 °F (36.5 °C), Min:97.5 °F (36.4 °C), Max:97.8 °F (36.6 °C)    No intake/output data recorded. No intake/output data recorded. General:   Obtunded but maintaining his airway              Head: Normocephalic, without obvious abnormality, atraumatic.    Eyes:  Conjunctivae and skin appear pale and yellowish. PERRL, EOMs intact. Nose: Nares normal. No drainage or sinus tenderness. Neck: Supple, symmetrical, trachea midline, no adenopathy, thyroid: no enlargement, no carotid bruit and no JVD. Lungs:   Clear to auscultation bilaterally. Heart:  Regular rate and rhythm, S1, S2 normal.     Abdomen: Soft, non-tender. Bowel sounds normal.    Extremities: Extremities with significant edema. Left arm fistula without a brisk thrill   Pulses: 2+ and symmetric all extremities. Skin:  No rashes or lesions   Neurologic: Obtunded, asterixis +, No focal motor or sensory deficit. Labs Reviewed:  Recent Results (from the past 24 hour(s))   CBC WITH AUTOMATED DIFF    Collection Time: 05/08/22  8:55 PM   Result Value Ref Range    WBC 7.0 4.6 - 13.2 K/uL    RBC 4.32 (L) 4.35 - 5.65 M/uL    HGB 12.6 (L) 13.0 - 16.0 g/dL    HCT 37.8 36.0 - 48.0 %    MCV 87.5 78.0 - 100.0 FL    MCH 29.2 24.0 - 34.0 PG    MCHC 33.3 31.0 - 37.0 g/dL    RDW 21.2 (H) 11.6 - 14.5 %    PLATELET 710 847 - 482 K/uL    MPV 10.1 9.2 - 11.8 FL    NRBC 0.0 0  WBC    ABSOLUTE NRBC 0.00 0.00 - 0.01 K/uL    NEUTROPHILS 67 40 - 73 %    LYMPHOCYTES 14 (L) 21 - 52 %    MONOCYTES 16 (H) 3 - 10 %    EOSINOPHILS 1 0 - 5 %    BASOPHILS 1 0 - 2 %    IMMATURE GRANULOCYTES 1 (H) 0.0 - 0.5 %    ABS. NEUTROPHILS 4.7 1.8 - 8.0 K/UL    ABS. LYMPHOCYTES 1.0 0.9 - 3.6 K/UL    ABS. MONOCYTES 1.1 0.05 - 1.2 K/UL    ABS. EOSINOPHILS 0.1 0.0 - 0.4 K/UL    ABS. BASOPHILS 0.1 0.0 - 0.1 K/UL    ABS. IMM.  GRANS. 0.1 (H) 0.00 - 0.04 K/UL    DF AUTOMATED     METABOLIC PANEL, COMPREHENSIVE    Collection Time: 05/08/22  8:55 PM   Result Value Ref Range    Sodium 134 (L) 136 - 145 mmol/L    Potassium 5.7 (H) 3.5 - 5.5 mmol/L    Chloride 98 (L) 100 - 111 mmol/L    CO2 24 21 - 32 mmol/L    Anion gap 12 3.0 - 18 mmol/L    Glucose 87 74 - 99 mg/dL    BUN 52 (H) 7.0 - 18 MG/DL    Creatinine 6.35 (H) 0.6 - 1.3 MG/DL    BUN/Creatinine ratio 8 (L) 12 - 20      GFR est AA 11 (L) >60 ml/min/1.73m2    GFR est non-AA 9 (L) >60 ml/min/1.73m2    Calcium 8.8 8.5 - 10.1 MG/DL    Bilirubin, total 1.3 (H) 0.2 - 1.0 MG/DL    ALT (SGPT) 27 16 - 61 U/L    AST (SGOT) 50 (H) 10 - 38 U/L    Alk. phosphatase 186 (H) 45 - 117 U/L    Protein, total 7.0 6.4 - 8.2 g/dL    Albumin 2.6 (L) 3.4 - 5.0 g/dL    Globulin 4.4 (H) 2.0 - 4.0 g/dL    A-G Ratio 0.6 (L) 0.8 - 1.7     TROPONIN-HIGH SENSITIVITY    Collection Time: 05/08/22  8:55 PM   Result Value Ref Range    Troponin-High Sensitivity 39 0 - 78 ng/L   NT-PRO BNP    Collection Time: 05/08/22  8:55 PM   Result Value Ref Range    NT pro-BNP 94,408 (H) 0 - 900 PG/ML   PH, VENOUS    Collection Time: 05/08/22  8:55 PM   Result Value Ref Range    VENOUS PH 7.44 (H) 7.32 - 7.42     AMMONIA    Collection Time: 05/08/22 10:45 PM   Result Value Ref Range    Ammonia, plasma 131 (H) 11 - 32 UMOL/L   METABOLIC PANEL, COMPREHENSIVE    Collection Time: 05/09/22  3:40 AM   Result Value Ref Range    Sodium 135 (L) 136 - 145 mmol/L    Potassium 4.8 3.5 - 5.5 mmol/L    Chloride 100 100 - 111 mmol/L    CO2 21 21 - 32 mmol/L    Anion gap 14 3.0 - 18 mmol/L    Glucose 104 (H) 74 - 99 mg/dL    BUN 55 (H) 7.0 - 18 MG/DL    Creatinine 6.97 (H) 0.6 - 1.3 MG/DL    BUN/Creatinine ratio 8 (L) 12 - 20      GFR est AA 10 (L) >60 ml/min/1.73m2    GFR est non-AA 8 (L) >60 ml/min/1.73m2    Calcium 9.0 8.5 - 10.1 MG/DL    Bilirubin, total 1.4 (H) 0.2 - 1.0 MG/DL    ALT (SGPT) 24 16 - 61 U/L    AST (SGOT) 33 10 - 38 U/L    Alk.  phosphatase 166 (H) 45 - 117 U/L    Protein, total 6.5 6.4 - 8.2 g/dL    Albumin 2.4 (L) 3.4 - 5.0 g/dL    Globulin 4.1 (H) 2.0 - 4.0 g/dL    A-G Ratio 0.6 (L) 0.8 - 1.7     LACTIC ACID    Collection Time: 05/09/22  3:40 AM   Result Value Ref Range    Lactic acid 2.1 (HH) 0.4 - 2.0 MMOL/L   CBC WITH AUTOMATED DIFF    Collection Time: 05/09/22  3:40 AM   Result Value Ref Range    WBC 7.7 4.6 - 13.2 K/uL    RBC 3.77 (L) 4.35 - 5.65 M/uL    HGB 11.1 (L) 13.0 - 16.0 g/dL    HCT 33.3 (L) 36.0 - 48.0 %    MCV 88.3 78.0 - 100.0 FL    MCH 29.4 24.0 - 34.0 PG    MCHC 33.3 31.0 - 37.0 g/dL    RDW 21.4 (H) 11.6 - 14.5 %    PLATELET 651 284 - 692 K/uL    MPV 10.9 9.2 - 11.8 FL    NRBC 0.0 0  WBC    ABSOLUTE NRBC 0.00 0.00 - 0.01 K/uL    NEUTROPHILS 74 (H) 40 - 73 %    LYMPHOCYTES 10 (L) 21 - 52 %    MONOCYTES 14 (H) 3 - 10 %    EOSINOPHILS 1 0 - 5 %    BASOPHILS 1 0 - 2 %    IMMATURE GRANULOCYTES 1 (H) 0.0 - 0.5 %    ABS. NEUTROPHILS 5.7 1.8 - 8.0 K/UL    ABS. LYMPHOCYTES 0.8 (L) 0.9 - 3.6 K/UL    ABS. MONOCYTES 1.1 0.05 - 1.2 K/UL    ABS. EOSINOPHILS 0.1 0.0 - 0.4 K/UL    ABS. BASOPHILS 0.1 0.0 - 0.1 K/UL    ABS. IMM. GRANS. 0.0 0.00 - 0.04 K/UL    DF AUTOMATED         All lab results for the last 24 hours reviewed. Procedures/imaging:  XR CHEST PORT  Narrative: EXAMINATION: Chest single view    INDICATION: Edema, altered mental status    COMPARISON: None    FINDINGS: Single frontal view the chest obtained. Low lung volumes limits  evaluation. Mildly enlarged cardiac silhouette. Perihilar and beyond  interstitial prominence. No confluent consolidation. No evidence of  pneumothorax. No acute osseous findings. Impression: Mildly enlarged cardiac silhouette with evidence of interstitial  infiltrate/edema. Low lung volumes may exaggerate findings. CT HEAD WO CONT  Narrative: EXAMINATION: CT head without contrast    INDICATION: Altered mental status    COMPARISON: None    TECHNIQUE: CT head without contrast with multiplanar reformations. All CT scans  at this facility are performed using dose optimization technique as appropriate  to a performed exam, to include automated exposure control, adjustment of the mA  and/or kV according to patient size (including appropriate matching first site  specific examinations), or use of iterative reconstruction technique. FINDINGS:    No focal mass effect or shift of midline structures.  Mild to moderate burden of  periventricular and patchy subcortical white matter low-attenuation. No acute  intracranial hemorrhage. No abnormal extra-axial collection. Ventricles are  normal in size and configuration. ICA siphon atherosclerotic calcifications. Imaged paranasal sinuses and mastoids well-aerated. Calvarium intact. Superficial soft tissues unremarkable. Impression: No clearly acute intracranial findings. Of note, MRI is more sensitive for  detecting acute infarct. Mild to moderate burden of periventricular and patchy subcortical white matter  low-attenuation, likely chronic microvascular ischemic change. All Micro Results     None             Assessment/Plan     Principal Problem:    Encephalopathy (5/8/2022)    Active Problems:    Acute metabolic encephalopathy (0/6/4000)      Probably secondary to progressive uremia- elevated BUN, NH3 and Lactic Acid. No fever or leukocytosis. No evidence of cellulitis at fistula site. Check Blood Cx x2 and in-out cath for urine culture. Consult Nephrology for repeat HD and reevaluation of HD goals as home HD 5 times per week does not seem to be adequate. Evidence of hypervolemia and congestive hepatopathy with elevated Bili and Ammonia  Keep NPO due to mental status. Try rectal Lactulose    HTN- Possibly hypertensive encephalopathy as well. Hydralazine IV q4h prn while NPO    DM- Monitor FSBS while NPO    Active Hospital Problems    Diagnosis Date Noted    Acute metabolic encephalopathy 03/45/0930    Encephalopathy 05/08/2022        Code Status: Full Code    BMI: There is no height or weight on file to calculate BMI. Weight:  Wt Readings from Last 3 Encounters:   03/03/22 102.1 kg (225 lb)   12/27/21 102.3 kg (225 lb 8.5 oz)   07/07/21 121.6 kg (268 lb)          DVT/GI Prophylaxis: Bharati Cain MD  5/9/2022 1:35 AM    Disclaimer: Sections of this note are dictated using utilizing voice recognition software. Minor typographical errors may be present.  If questions arise, please do not hesitate to contact me or call our department.

## 2022-05-09 NOTE — PROGRESS NOTES
Pt in bed, Alert but lethargic, ALCON orientation at this time, no s/s of distress noted. Accessed AVF Left upper arm w/15G needle  per protocol. Tx initiated at 1445. AVF flowing with ease. For hemodynamic stability UF goal 3500 ml. Offered assistance with repositioning every 2 hours. Vascular access visible at all times during treatment, line connections intact at all times. Pt constantly having to be redirected to keep access arm straight. Patient keeps bending arm so that his hand is behind his head. Pt is at risk for infiltration if he continues to bend access arm. Tx completed at 1818, tolerated well 3L removed. Unit nurse given report. De-accessed per protocol. Clot time 5 minutes for arterial, and 5 minutes for venous.                   ACUTE HEMODIALYSIS FLOW SHEET    HEMODIALYSIS ORDERS: Physician: Dr. Ann Powersite: Revaclear   Duration: 3.5 hr   BFR: 400   DFR: 600   Dialysate:  Temp 36-37*C   K+  2    Ca+ 2.5   Na 138   Bicarb 35   Wt Readings from Last 1 Encounters:   05/09/22 110.2 kg (242 lb 15.2 oz)    Patient Chart [x]   Unable to Obtain []  Dry weight/UF Goal: 3500 ml    Heparin []  Bolus    Units    [] Hourly    Units    [x]None       Pre BP: 166/94  Pulse: 78  Respirations: 18 Temp: 97  [] Oral  [x] Ax  [] Esoph   Labs: []  Pre  []  Post:   [x] N/A   Additional Orders (medications, blood products, hypotension management): [] Yes   [x] No     [x]  [facility] Consent Verified     CATHETER ACCESS:  [x]N/A   []Right   []Left   []IJ   []Fem  []Chest wall  []TransHepatic   [] First use X-ray verified     []Tunnel    [] Non Tunneled   []No S/S infection  []Redness  []Drainage []Cultured []Swelling []Pain   []Medical Aseptic Prep Utilized   []Dressing Changed  [] Biopatch  Date:    []Clotted   [x]Patent   Flows: [x]Good  []Poor  []Reversed   If access problem,  notified: []Yes    [x]N/A        GRAFT/FISTULA ACCESS:   []N/A     []Right     [x]Left     [x]UE     []LE   []AVG [x]AVF       [x]Medical Aseptic Prep Utilized   [x]No S/S infection  []Redness  []Drainage [] Cultured  [] Swelling  [] Pain  Bruit:   [x] Strong    [] Weak       Thrill :   [x] Strong    [] Weak     Needle Gauge: 15   Length: 1 inch   If access problem,  notified: []Yes     [x]N/A          GENERAL ASSESSMENT:    LUNGS:  Resp Rate 78   [x] Clear  [] Coarse  [] Crackles  [] Wheezing  [] Diminished                                                           [] RLL   [] LLL  [] RUL   [] EDEN            Respirations:  [x]Easy  []Labored  []N/A  Cough:  []Productive  []Dry  []N/A               Therapy:  [x]RA   [] Ventilated   [] Intubated   [] Trach            O2 Device:  [] NC   [] NRB  [] Trach Mask  [] BiPaP  Flow:   l/min                                                    CARDIAC: [x] Regular      [] Irregular   [] Rhythm:          [] Monitored   [] Bedside   [x] Remotely monitored       EDEMA: [x] None   []Generalized  [] Pitting [] 1+   [] 2 +   [] 3+    [] 4+        SKIN:   [] Hot     [] Cold    [x] Warm   [x] Dry    [] Diaphoretic                 [] Flushed  [] Jaundiced  [] Cyanotic  [] Pale      LOC:    [x] Alert      [x]Oriented:    [x] Person     [] Place   []Time               [] Confused  [x] Lethargic  [x] Medicated  [] Non-responsive  [] Non-Verbal     GI / ABDOMEN:                     [] Flat    [] Distended    [] Soft    [] Firm   []  Obese                   [] Diarrhea   [] FMS [] Bowel Sounds  [] Nausea  [] Vomiting                   [] NGT  [] OGT  [] PEG  [] Tube Feedings @     mL/hr     / URINE ASSESSMENT:                   [] Voiding    [] Oliguria  [] Anuria                     []  [place]   [] Incontinent  []  Incontinent Brief   []  PureWick     PAIN:  [x] 0 []1  []2   []3   []4   []5   []6   []7   []8   []9   []10                MOBILITY:  [x] Bed    [] Stretcher      All Vitals and Treatment Details on Attached [address]: [facility]          Room # 457/01    [x] Routine         [] 1st Time Acute/Chronic   [] Urgent      [] Stat            [] Acute Room   []  Bedside    [] ICU/CCU     [] ER     Isolation Precautions:  [x] Dialysis    There are currently no Active Isolations     ALLERGIES:     Allergies   Allergen Reactions    Opioids - Morphine Analogues Swelling     \"stops my body from recovering\"      Amitriptyline Other (comments)     Myalgia noted [facility] records  GI Distress      Amoxicillin Nausea and Vomiting    Calcium Other (comments)     \"doesn't metabolize in my system quick enough by the time I take the next dose\"    Calcium Channel Blocking Agent Diltiazem Analogues Other (comments)    Canagliflozin Other (comments)    Empagliflozin Other (comments)    Gabapentin Nausea and Vomiting and Other (comments)     Urinary retention    Metformin Nausea and Vomiting    Nifedipine Other (comments)    Oxycodone Hcl-Oxycodone-Asa Other (comments)     \"doesn't metabolize in my system quick enough by the time I take the next dose\"  Gi Distress          Code Status:  Full Code     Hepatitis Status      Lab Results   Component Value Date/Time    Hepatitis B surface Ag <0.10 05/09/2022 11:19 AM    Hepatitis B surface Ab 91.01 05/09/2022 11:19 AM        Current Labs:      Lab Results   Component Value Date/Time    WBC 7.7 05/09/2022 03:40 AM    HGB 11.1 (L) 05/09/2022 03:40 AM    HCT 33.3 (L) 05/09/2022 03:40 AM    PLATELET 479 95/59/5926 03:40 AM    MCV 88.3 05/09/2022 03:40 AM     Lab Results   Component Value Date/Time    Sodium 135 (L) 05/09/2022 03:40 AM    Potassium 4.8 05/09/2022 03:40 AM    Chloride 100 05/09/2022 03:40 AM    CO2 21 05/09/2022 03:40 AM    Anion gap 14 05/09/2022 03:40 AM    Glucose 104 (H) 05/09/2022 03:40 AM    BUN 55 (H) 05/09/2022 03:40 AM    Creatinine 6.97 (H) 05/09/2022 03:40 AM    BUN/Creatinine ratio 8 (L) 05/09/2022 03:40 AM    GFR est AA 10 (L) 05/09/2022 03:40 AM    GFR est non-AA 8 (L) 05/09/2022 03:40 AM    Calcium 9.0 05/09/2022 03:40 AM DIET:  DIET NPO     PRIMARY NURSE REPORT:   Pre Dialysis:  , RN    Time:        EDUCATION:    [x] Patient           Knowledge Basis: []None [x]Minimal [] Substantial [] Unknown  Barriers to learning  [x]None  [] Intubated/Trached/Ventilated  [] Sedated/Paralyzed   [] Access Care     [] S&S of infection  [] Fluid Management  [] K+   [x] Procedural    [] Medications   [] Tx Options   [] Transplant   [] Diet      Teaching Tools:  [x] Explain  [] Demo  [] Handouts [] Video  Patient response: [] Verbalized understanding   [] Requires follow up        [x] Time Out/Safety Check    [x] Extracorporeal Circuit Tested for integrity       RO/HEMODIALYSIS MACHINE SAFETY CHECKS  Before each treatment:        [address] [place] [address]                                     [x] Unit Machine # 6 with centralized RO                                  [] Portable Machine #1/RO serial # [id number]                                  [] Portable Machine #2/RO serial # [id number]                                  [] Portable Machine #4/RO serial # [id number]                                  [] Portable Machine #10/RO serial # [id number]                                                                                                       Alarm Test:  Pass time 1350            [x] RO/Machine Log Complete    Machine Temp    36-37*C             Dialysate: pH  7.4    Conductivity: Meter 14.0    HD Machine  13.8     TCD: 13.7  Dialyzer Lot # [id number]     Blood Tubing Lot # [id number]     Saline Lot # [id number]     CHLORINE TESTING-Before each treatment and every 4 hours    Total Chlorine: [x] less than 0.1 ppm  Initial Time Check: 1400       4 Hr/2nd Check Time: NR   (if greater than 0.1 ppm from Primary then every 30 minutes from Secondary)     TREATMENT INITIATION  with Dialysis Precautions:   [x] All Connections Secured              [x] Saline Line Double Clamped   [x] Venous Parameters Set               [x] Arterial Parameters Set    [x] Prime Given 250ml NSS [x]Air Foam Detector Engaged        Treatment Initiation Note:  See above note    During Treatment Notes:  5922  Face & Vascular access visible with art and michele line connections intact. Pt tolerating dialysis. 1500  Face & Vascular access visible with art and michele line connections intact. Pt tolerating dialysis. 1515  Face & Vascular access visible with art and michele line connections intact. Pt tolerating dialysis. 1530  Face & Vascular access visible with art and michele line connections intact. Pt tolerating dialysis. 1545  Face & Vascular access visible with art and michele line connections intact. Pt tolerating dialysis. 1600  Face & Vascular access visible with art and michele line connections intact. Pt tolerating dialysis. 1615  Face & Vascular access visible with art and michele line connections intact. Pt tolerating dialysis. 1630  Face & Vascular access visible with art and michele line connections intact. Pt tolerating dialysis. 1645  Face & Vascular access visible with art and michele line connections intact. Pt tolerating dialysis. 1700  Face & Vascular access visible with art and michele line connections intact. Pt tolerating dialysis. 1715  Face & Vascular access visible with art and michele line connections intact. Pt tolerating dialysis. 1730  Face & Vascular access visible with art and michele line connections intact. Pt tolerating dialysis. 1745  Face & Vascular access visible with art and michele line connections intact. Pt tolerating dialysis. 1800  Face & Vascular access visible with art and michele line connections intact. Pt tolerating dialysis. 1815  Face & Vascular access visible with art and michele line connections intact. Pt tolerating dialysis. 1818  Dialysis treatment complete.        Medication    Dose    Volume Route      Time       Jodeeita Nurse      HD        HD        HD       Post Assessment  Dialyzer Cleared:   [] Good  [x] Fair  [] Poor  Blood processed:  79.4 L  UF Removed:  3000 Ml    Post BP: 169/84  Pulse: 85  Respirations: 18   Temp:    [] Oral  [] Ax  [] Esophageal   Lungs: [] Clear                [x] No change from initial assessment   Post Tx Vascular Access: [] N/A  AVF/AVG: Bleeding stopped with  Arterial Pressure for 5 min   Venous Pressure for 5 min      Cardiac:  [x] Regular   [] Irregular   Rhythm:  [] Monitored   [] Not Monitored    CVC Catheter: [x] N/A  Locking solution: Heparin 1:1000 U  Arterial port   ml   Venous port   ml   Edema:  [] None  [] Generalized                     Skin:[x] Warm  [x] Dry [] Diaphoretic               [] Flushed  [] Pale [] Cyanotic Pain:  [x]0  []1-2  []3-4  []5-6   []7-8  []9-10         Post Treatment Note:   see above note     POST TREATMENT PRIMARY NURSE HANDOFF REPORT:   Post Dialysis:  , RN        Time:          Abbreviations: AVG-arterial venous graft, AVF-arterial venous fistula, IJ-Internal Jugular, Subcl-Subclavian, Fem-Femoral, Tx-treatment, AP/HR-apical heart rate, VSS- Vital Signs Stable, CVC- Central Venous Catheter, DFR-dialysate flow rate, BFR-blood flow rate, AP-arterial pressure, -venous pressure, UF-ultrafiltrate, TMP-transmembrane pressure, Ciro-Venous, Art-Arterial, RO-Reverse Osmosis

## 2022-05-09 NOTE — PROGRESS NOTES
[facility] Hospitalist Group  Progress Note    Patient: Tiffanie Mg Age: 46 y.o. : 1970 MR#: [id number] SSN: [id number]  Date/Time: 2022     C/C: AMS      Subjective:   HPI : 54-year-old male past history of end-stage renal disease on home dialysis, admitted with altered mental status noticed by 3 days by his wife, for fever chills rigors no other symptoms. No focal weakness. Patient had high ammonia level on admission exam was unremarkable          Review of Systems:  Per ROS cannot be obtained due to patient's mental condition    Assessment/Plan:     1. AMS/acute metabolic encephalopathy  2 end-stage renal disease on hemodialysis at home  3 anemia of chronic illness  4 DM2  5 H/o HTN     PLAN  -Acute encephalopathy appears to be the cause of his mental status dissociation probably baseline is not the greatest unable to get better at this point. Continue treating high ammonia levels patient cannot receive any more lactulose anemia due to unable to retain anemia patient is now on hemodialysis in the hospital for this we will remove, excessive ammonia. Also neurology consultation. Follow MRI results to rule out any acute pathology.    -Continue HD per renal Case discussed with renal    -SSI for diabetes control    - low blood glucose and NPO so low rate D5W     Long discussion with patient's wife in room with patient, she gave me some more details regarding patient's history apparently patient had similar episode before and he was in [facility].  I discussed with her regarding current management plan differential diagnosis and possible treatment.   She verbalized understanding    Objective:       General:  Confused   HEENT: No facial asymmetry, ÁNGEL Obdulio, External ears - WNL    Cardiovascular: S1S2 - regular , No Murmur   Pulmonary: Equal expansion , No Use of accessory muscles , No Rales No Rhonchi    GI:  +BS in all four quadrants, soft, non-tender  Extremities:  No edema; 2+ dorsalis pedis pulses bilaterally  Neuro: sleepy       DVT Prophylaxis:  []Lovenox  [x]Hep SQ  []SCDs  []Coumadin   []On Heparin gtt    [] Eliquis [] Xarelto     Vitals:         VS:   Visit Vitals  BP (!) 158/81   Pulse 82   Temp 97 °F (36.1 °C) (Axillary)   Resp 18   Wt 110.2 kg (242 lb 15.2 oz)   SpO2 95%   BMI 33.88 kg/m²      Tmax/24hrs: Temp (24hrs), Av.7 °F (36.5 °C), Min:97 °F (36.1 °C), Max:98.3 °F (36.8 °C)        Medications:   Current Facility-Administered Medications   Medication Dose Route Frequency    sodium chloride (NS) flush 5-40 mL  5-40 mL IntraVENous Q8H    sodium chloride (NS) flush 5-40 mL  5-40 mL IntraVENous PRN    acetaminophen (TYLENOL) tablet 650 mg  650 mg Oral Q6H PRN    Or    acetaminophen (TYLENOL) suppository 650 mg  650 mg Rectal Q6H PRN    polyethylene glycol (MIRALAX) packet 17 g  17 g Oral DAILY PRN    ondansetron (ZOFRAN) injection 4 mg  4 mg IntraVENous Q6H PRN    heparin (porcine) injection 5,000 Units  5,000 Units SubCUTAneous Q12H    hydrALAZINE (APRESOLINE) 20 mg/mL injection 20 mg  20 mg IntraVENous Q4H PRN    insulin lispro (HUMALOG) injection   SubCUTAneous Q4H    glucose chewable tablet 16 g  16 g Oral PRN    glucagon (GLUCAGEN) injection 1 mg  1 mg IntraMUSCular PRN    dextrose 10% infusion 0-250 mL  0-250 mL IntraVENous PRN    dextrose 5% infusion  50 mL/hr IntraVENous CONTINUOUS       Labs:    Recent Labs     22   WBC 7.7 7.0   HGB 11.1* 12.6*   HCT 33.3* 37.8    189     Recent Labs     22  03422   * 134*   K 4.8 5.7*    98*   CO2 21 24   * 87   BUN 55* 52*   CREA 6.97* 6.35*   CA 9.0 8.8   ALB 2.4* 2.6*   ALT 24 27         Time spent on direct patient care >30 mints     Complexity : High complex - due to multiple medical issues outlined above.      CODE Status : Full CODE     Case discussed with:  []Patient  [x] Family patient's wife[x]Nursing  []Case Management Disclaimer: Sections of this note are dictated utilizing voice recognition software, which may have resulted in some phonetic based errors in grammar and contents. Even though attempts were made to correct all the mistakes, some may have been missed, and remained in the body of the document. If questions arise, please contact our department.     Signed By: Nicole العلي MD     May 9, 2022

## 2022-05-10 LAB
ALBUMIN SERPL-MCNC: 2.6 G/DL (ref 3.4–5)
ALBUMIN/GLOB SERPL: 0.7 {RATIO} (ref 0.8–1.7)
ALP SERPL-CCNC: 168 U/L (ref 45–117)
ALT SERPL-CCNC: 25 U/L (ref 16–61)
ANION GAP SERPL CALC-SCNC: 11 MMOL/L (ref 3–18)
AST SERPL-CCNC: 34 U/L (ref 10–38)
BASOPHILS # BLD: 0.1 K/UL (ref 0–0.1)
BASOPHILS NFR BLD: 1 % (ref 0–2)
BILIRUB SERPL-MCNC: 1.4 MG/DL (ref 0.2–1)
BUN SERPL-MCNC: 37 MG/DL (ref 7–18)
BUN/CREAT SERPL: 7 (ref 12–20)
CALCIUM SERPL-MCNC: 8.9 MG/DL (ref 8.5–10.1)
CALCIUM SERPL-MCNC: 8.9 MG/DL (ref 8.5–10.1)
CHLORIDE SERPL-SCNC: 99 MMOL/L (ref 100–111)
CO2 SERPL-SCNC: 26 MMOL/L (ref 21–32)
CREAT SERPL-MCNC: 5.36 MG/DL (ref 0.6–1.3)
DIFFERENTIAL METHOD BLD: ABNORMAL
EOSINOPHIL # BLD: 0.1 K/UL (ref 0–0.4)
EOSINOPHIL NFR BLD: 1 % (ref 0–5)
ERYTHROCYTE [DISTWIDTH] IN BLOOD BY AUTOMATED COUNT: 21.4 % (ref 11.6–14.5)
GLOBULIN SER CALC-MCNC: 4 G/DL (ref 2–4)
GLUCOSE BLD STRIP.AUTO-MCNC: 100 MG/DL (ref 70–110)
GLUCOSE BLD STRIP.AUTO-MCNC: 110 MG/DL (ref 70–110)
GLUCOSE BLD STRIP.AUTO-MCNC: 111 MG/DL (ref 70–110)
GLUCOSE BLD STRIP.AUTO-MCNC: 133 MG/DL (ref 70–110)
GLUCOSE BLD STRIP.AUTO-MCNC: 92 MG/DL (ref 70–110)
GLUCOSE SERPL-MCNC: 135 MG/DL (ref 74–99)
HCT VFR BLD AUTO: 35.3 % (ref 36–48)
HGB BLD-MCNC: 11.7 G/DL (ref 13–16)
IMM GRANULOCYTES # BLD AUTO: 0 K/UL (ref 0–0.04)
IMM GRANULOCYTES NFR BLD AUTO: 1 % (ref 0–0.5)
LACTATE SERPL-SCNC: 1.7 MMOL/L (ref 0.4–2)
LACTATE SERPL-SCNC: 2.7 MMOL/L (ref 0.4–2)
LYMPHOCYTES # BLD: 0.8 K/UL (ref 0.9–3.6)
LYMPHOCYTES NFR BLD: 14 % (ref 21–52)
MCH RBC QN AUTO: 28.6 PG (ref 24–34)
MCHC RBC AUTO-ENTMCNC: 33.1 G/DL (ref 31–37)
MCV RBC AUTO: 86.3 FL (ref 78–100)
MONOCYTES # BLD: 1 K/UL (ref 0.05–1.2)
MONOCYTES NFR BLD: 18 % (ref 3–10)
NEUTS SEG # BLD: 3.8 K/UL (ref 1.8–8)
NEUTS SEG NFR BLD: 66 % (ref 40–73)
NRBC # BLD: 0 K/UL (ref 0–0.01)
NRBC BLD-RTO: 0 PER 100 WBC
PHOSPHATE SERPL-MCNC: 3.4 MG/DL (ref 2.5–4.9)
PLATELET # BLD AUTO: 199 K/UL (ref 135–420)
PMV BLD AUTO: 10.4 FL (ref 9.2–11.8)
POTASSIUM SERPL-SCNC: 4.4 MMOL/L (ref 3.5–5.5)
PROT SERPL-MCNC: 6.6 G/DL (ref 6.4–8.2)
PTH-INTACT SERPL-MCNC: 182.5 PG/ML (ref 18.4–88)
RBC # BLD AUTO: 4.09 M/UL (ref 4.35–5.65)
SODIUM SERPL-SCNC: 136 MMOL/L (ref 136–145)
WBC # BLD AUTO: 5.8 K/UL (ref 4.6–13.2)

## 2022-05-10 PROCEDURE — 80053 COMPREHEN METABOLIC PANEL: CPT

## 2022-05-10 PROCEDURE — 65270000046 HC RM TELEMETRY

## 2022-05-10 PROCEDURE — 83605 ASSAY OF LACTIC ACID: CPT

## 2022-05-10 PROCEDURE — 74011000250 HC RX REV CODE- 250: Performed by: INTERNAL MEDICINE

## 2022-05-10 PROCEDURE — 85025 COMPLETE CBC W/AUTO DIFF WBC: CPT

## 2022-05-10 PROCEDURE — 2709999900 HC NON-CHARGEABLE SUPPLY

## 2022-05-10 PROCEDURE — 90935 HEMODIALYSIS ONE EVALUATION: CPT

## 2022-05-10 PROCEDURE — 74011250636 HC RX REV CODE- 250/636: Performed by: INTERNAL MEDICINE

## 2022-05-10 PROCEDURE — 82962 GLUCOSE BLOOD TEST: CPT

## 2022-05-10 PROCEDURE — 36415 COLL VENOUS BLD VENIPUNCTURE: CPT

## 2022-05-10 PROCEDURE — 83970 ASSAY OF PARATHORMONE: CPT

## 2022-05-10 PROCEDURE — 99232 SBSQ HOSP IP/OBS MODERATE 35: CPT | Performed by: INTERNAL MEDICINE

## 2022-05-10 PROCEDURE — 84100 ASSAY OF PHOSPHORUS: CPT

## 2022-05-10 RX ADMIN — HYDRALAZINE HYDROCHLORIDE 20 MG: 20 INJECTION, SOLUTION INTRAMUSCULAR; INTRAVENOUS at 06:11

## 2022-05-10 RX ADMIN — ONDANSETRON 4 MG: 2 INJECTION INTRAMUSCULAR; INTRAVENOUS at 12:24

## 2022-05-10 RX ADMIN — DEXTROSE MONOHYDRATE 50 ML/HR: 50 INJECTION, SOLUTION INTRAVENOUS at 17:44

## 2022-05-10 RX ADMIN — SODIUM CHLORIDE, PRESERVATIVE FREE 10 ML: 5 INJECTION INTRAVENOUS at 22:00

## 2022-05-10 RX ADMIN — HEPARIN SODIUM 5000 UNITS: 5000 INJECTION INTRAVENOUS; SUBCUTANEOUS at 04:06

## 2022-05-10 RX ADMIN — SODIUM CHLORIDE, PRESERVATIVE FREE 10 ML: 5 INJECTION INTRAVENOUS at 06:00

## 2022-05-10 RX ADMIN — HYDRALAZINE HYDROCHLORIDE 20 MG: 20 INJECTION, SOLUTION INTRAMUSCULAR; INTRAVENOUS at 20:20

## 2022-05-10 NOTE — PROGRESS NOTES
conducted an initial consultation and Spiritual Assessment for Bg Riddle, who is a 46 y.o.,male. Patients Primary Language is: [place]. According to the patients EMR Congregation Affiliation is: Christian. The reason the Patient came to the hospital is:   Patient Active Problem List    Diagnosis Date Noted    Acute metabolic encephalopathy 79/24/3866    Encephalopathy 05/08/2022    Acute pancreatitis 10/09/2018    Type 2 diabetes mellitus with hyperglycemia ([facility] [address] 10/09/2018    GRACIELA (acute kidney injury) ([facility] 10/09/2018    Elevated serum alkaline phosphatase level 10/09/2018    Hyponatremia 10/09/2018        The  provided the following Interventions:  Initiated a relationship of care and support. Listened empathically. Provided information about Spiritual Care Services. Offered prayer and assurance of continued prayers on patient's behalf. Chart reviewed. The following outcomes where achieved:  Patient processed feeling about current hospitalization. Patient expressed gratitude for 's visit. Assessment:  Patient does not have any Tenriism/cultural needs that will affect patients preferences in health care. There are no spiritual or Tenriism issues which require intervention at this time. Plan:  Chaplains will continue to follow and will provide pastoral care on an as needed/requested basis.  recommends bedside caregivers page  on duty if patient shows signs of acute spiritual or emotional distress.       [address] [facility]   [phone]

## 2022-05-10 NOTE — PROGRESS NOTES
Reason for Admission:  Encephalopathy [Q37.86]  Acute metabolic encephalopathy [K59.79]                 RUR Score:   15%           Plan for utilizing home health:   [facility]                    Likelihood of Readmission:   Moderate                         Do you (patient/family) have any concerns for transition/discharge?  no    Transition of Care Plan:       Initial assessment completed with patient and spouse/. Cognitive status of patient: oriented to person and place    Face sheet information confirmed:  yes. Patsy Avina, spouse ([phone])  provided needed discharge  information. This patient lives in a single family home with spouse. Patient was able to navigate steps as needed with assistance and assistive device. Prior to hospitalization, patient was considered to be independent with ADLs/IADLS : no . If not independent,  patient needs assist with : dressing, bathing, food preparation and grooming    Patient has a current ACP document on file: no    Transportation needs to be determined prior to  discharge. The patient already has Arlean Josefina, and rollator available in the home. Patient is currently active with home health. If active, agency name is [facility]. Patient has stayed in a skilled nursing facility or rehab. Was  stay within last 60 days : yes. This patient is on dialysis :yes    If yes, hemodialysis patient and receives treatment  in home. Has HD cycler at home. Freedom of choice signed: yes, for Rai     Currently, the discharge plan is Home with [address] [place] [address] Paul Morejon. Patient is active with [facility] for SN, and PT/OT per wife. CM will continue to monitor and assistance with transition of care needs.      The patient states that he can obtain his medications from the pharmacy, and take his medications as directed with assistance    Patient's current insurance is Medicare Part A & B and       Care Management Interventions  Mode of Transport at Discharge:  Other (see comment) (transportation need to be determined.)  Transition of Care Consult (CM Consult): Discharge [address]ori Christiansonjeremiah: No  Reason Outside [place]: Patient already serviced by other home care/hospice agency  Discharge Durable Medical Equipment: No  Occupational Therapy Consult: No  Speech Therapy Consult: No  Support Systems: Spouse/Significant Other  Confirm Follow Up Transport: Family  The Plan for Transition of Care is Related to the Following Treatment Goals : home health  Discharge Location  Patient Expects to be Discharged to[de-identified] Home with home health    SKINNY Agrawal, RN  Pager # [phone]  Care Manager

## 2022-05-10 NOTE — PROGRESS NOTES
EEG Attempted on 05/10/2022, but the patient was off the unit. The EEG will be re-attempted at a later time.

## 2022-05-10 NOTE — ROUTINE PROCESS
Bedside and Verbal shift change report given to Brittani Llanes LPN (oncoming nurse) by Karley Jones RN (offgoing nurse). Report included the following information SBAR, Kardex, MAR and Recent Results.

## 2022-05-10 NOTE — PROGRESS NOTES
Bedside and Verbal shift change report given to Brendon RADER (oncoming nurse) by Tommy Kong (offgoing nurse). Report included the following information SBAR, Kardex, Procedure Summary, MAR and Recent Results.

## 2022-05-10 NOTE — PROGRESS NOTES
INTERVENTION:  HEMODYNAMIC STABILIZATION  MAINTAIN BP WNL WHILE ON HD. INTERVENTION:  FLUID MANAGEMENT  WILL ATTEMPT 3500 ML TOTAL FLUID REMOVAL AS TOLERATED. INTERVENTION:  METABOLIC/ELECTROLYTE MANAGEMENT  2.0 POTASSIUM 2.5 CALCIUM DIALYSATE USED WITH HD TODAY. INTERVENTION:  HEMODIALYSIS ACCESS SITE MANAGEMENT  LEFT UPPER ARM AVF WITH 15G NEEDLE ACCESSED USING ASEPTIC TECHNIQUE. GOAL:  SIGNS AND SYMPTOMS OF LISTED POTENTIAL PROBLEMS WILL BE ABSENT OR MANAGEABLE. OUTCOME:  PROGRESSING. HD PLANNED FOR 3.5 HOURS TODAY.

## 2022-05-10 NOTE — PROGRESS NOTES
In Patient Progress note    Admit Date: 5/8/2022     Impression:     #1 End-stage renal disease on home hemodialysis,followed by Dr. Catherine Bryant at [facility], Home unit. Patient gets home hemodialysis 5 days a week, his last dialysis was on Saturday for 3.5 hours 3.5 L UF. He appears hypertensive and slightly overloaded. He had does have altered mental status, BUN elevated at 55 and creatinine of 6.97. Will need dialysis daily  #2 altered mental status, metabolic encephalopathy, unclear etiology, uremia could be contributing but per wife patient had been compliant with his dialysis 5 days a week. His BUN/creatinine do not look too bad, although with his poor nutritional status may still be contributing to altered mental status. Patient did have some incontinence of urine and stool with his confusion, wonder if he had an episode of seizures? Previous episode of altered mental status at [facility] was associated with urosepsis, sepsis needs to be ruled out. High ammonia could be from? Seizure or ?undiagnosed liver condition. PRES syndrome may be possibility secondary to severe uncontrolled hypertension.  Neuro getting EEG and MRI   #3 hypertension, uncontrolled  #4 diabetes mellitus  #5 hyperkalemia improved  #6 secondary hyperparathyroidism, check PTH and phosphorus     Plan:  #1 Daily hemodialysis to see if it improves his mental status  #2 f/u neurology recs for altered mental status , EEG , MRI   #3 check PTH, phosphorus  #4 no need of RIGOBERTO as hemoglobin at goal  #5 continue BP meds  #6 lactulose per primary team for hyperammonemia ,   workup per primary team      Plan discussed with Dr. Omar Hughes,     Please call with questions     Shanita Kinney MD [facility]  Cell [phone]  Pager: [phone]     Subjective:     - slightly improved mental status   - respiratory - stable  - hemodynamics - stable, no pressrs  - UOP-minimal  - Nutrition -poor    Objective:     Visit Vitals  BP (!) 186/102   Pulse 82   Temp 98.4 °F (36.9 °C) (Oral)   Resp 20   Wt 107.1 kg (236 lb 1.8 oz)   SpO2 97%   BMI 32.93 kg/m²         Intake/Output Summary (Last 24 hours) at 5/10/2022 1617  Last data filed at 5/9/2022 1818  Gross per 24 hour   Intake    Output 3000 ml   Net -3000 ml       Physical Exam:     More awake , answering some questions   HEENT: mmm  Neck: no cervical lymphadenopathy or thyromegaly. Lungs: good air entry, clear to auscultation bilaterally. Cardiovascular system: S1, S2, regular rate and rhythm. Abdomen: soft, non tender, non distended.   Extremities: 1+ le edema       Data Review:    Recent Labs     05/10/22  0239   WBC 5.8   RBC 4.09*   HCT 35.3*   MCV 86.3   MCH 28.6   MCHC 33.1   RDW 21.4*     Recent Labs     05/10/22  0239 05/09/22  0340 05/08/22 2055   BUN 37* 55* 52*   CREA 5.36* 6.97* 6.35*   CA 8.9 9.0 8.8   ALB 2.6* 2.4* 2.6*   K 4.4 4.8 5.7*    135* 134*   CL 99* 100 98*   CO2 26 21 24   * 104* 87       Angie Pablo MD

## 2022-05-10 NOTE — PROGRESS NOTES
Problem: Pressure Injury - Risk of  Goal: *Prevention of pressure injury  Description: Document Trevor Scale and appropriate interventions in the flowsheet. Outcome: Progressing Towards Goal  Note: Pressure Injury Interventions:  Sensory Interventions: Assess changes in LOC,Avoid rigorous massage over bony prominences,Check visual cues for pain,Float heels,Keep linens dry and wrinkle-free,Maintain/enhance activity level,Minimize linen layers,Monitor skin under medical devices,Turn and reposition approx. every two hours (pillows and wedges if needed)    Moisture Interventions: Absorbent underpads,Apply protective barrier, creams and emollients,Internal/External urinary devices,Check for incontinence Q2 hours and as needed    Activity Interventions: PT/OT evaluation,Increase time out of bed    Mobility Interventions: Turn and reposition approx. every two hours(pillow and wedges)    Nutrition Interventions: Discuss nutritional consult with provider    Friction and Shear Interventions: Apply protective barrier, creams and emollients,Foam dressings/transparent film/skin sealants,Lift sheet,Lift team/patient mobility team                Problem: Falls - Risk of  Goal: *Absence of Falls  Description: Document Keisha Fall Risk and appropriate interventions in the flowsheet.   Outcome: Progressing Towards Goal  Note: Fall Risk Interventions:       Mentation Interventions: Bed/chair exit alarm,Adequate sleep, hydration, pain control,Door open when patient unattended    Medication Interventions: Bed/chair exit alarm    Elimination Interventions: Bed/chair exit alarm,Call light in reach,Toileting schedule/hourly rounds    History of Falls Interventions: Bed/chair exit alarm,Door open when patient unattended         Problem: Pain  Goal: *Control of Pain  Outcome: Progressing Towards Goal     Problem: Chronic Renal Failure  Goal: *Fluid and electrolytes stabilized  Outcome: Progressing Towards Goal

## 2022-05-10 NOTE — PROGRESS NOTES
[facility] Hospitalist Group  Progress Note    Patient: Cameron Carvalho Age: 46 y.o. : 1970 MR#: [id number] SSN: [id number]  Date/Time: 5/10/2022     C/C: AMS        Subjective:   HPI : 51-year-old male past history of end-stage renal disease on home dialysis, admitted with altered mental status noticed by 3 days by his wife, for fever chills rigors no other symptoms. No focal weakness. Patient had high ammonia level on admission exam was unremarkable              Review of Systems:  Per ROS cannot be obtained due to patient's mental condition     Assessment/Plan:      1. AMS/acute metabolic encephalopathy  2 end-stage renal disease on hemodialysis at home  3 anemia of chronic illness  4 DM2  5 H/o HTN      PLAN  -Acute encephalopathy:  Likely causes-encephalopathy secondary to high ammonia level which improved after hemodialysis and patient's mentation improved, also MRI is suggestive of small vessel disease questionable early vascular dementia?   -Follow EEG     -Continue HD per renal Case discussed with renal     -SSI for diabetes control     -Discontinue IV D 5 , monitor blood glucose monitor for any hypoglycemic events     Called patient's wife, MRI result explained to her, also told EEG is pending, also explained patient is becoming more awake alert complaining about headache and dental pain which can be managed as an outpatient.   Possible discharge when EEG is done         Objective:         General:   Mental status has improved  HEENT: No facial asymmetry, ÁNGEL Obdulio, External ears - WNL    Cardiovascular: S1S2 - regular , No Murmur   Pulmonary: Equal expansion , No Use of accessory muscles , No Rales No Rhonchi    GI:  +BS in all four quadrants, soft, non-tender  Extremities:  No edema; 2+ dorsalis pedis pulses bilaterally  Neuro: sleepy         DVT Prophylaxis:  []Lovenox  [x]Hep SQ  []SCDs  []Coumadin   []On Heparin gtt     [] Eliquis [] Xarelto         Vitals:         VS: Visit Vitals  BP (!) 186/102   Pulse 82   Temp 98.4 °F (36.9 °C) (Oral)   Resp 20   Wt 107.1 kg (236 lb 1.8 oz)   SpO2 97%   BMI 32.93 kg/m²      Tmax/24hrs: Temp (24hrs), Av.5 °F (36.9 °C), Min:96.9 °F (36.1 °C), Max:99.4 °F (37.4 °C)        Medications:   Current Facility-Administered Medications   Medication Dose Route Frequency    sodium chloride (NS) flush 5-40 mL  5-40 mL IntraVENous Q8H    sodium chloride (NS) flush 5-40 mL  5-40 mL IntraVENous PRN    acetaminophen (TYLENOL) tablet 650 mg  650 mg Oral Q6H PRN    Or    acetaminophen (TYLENOL) suppository 650 mg  650 mg Rectal Q6H PRN    polyethylene glycol (MIRALAX) packet 17 g  17 g Oral DAILY PRN    ondansetron (ZOFRAN) injection 4 mg  4 mg IntraVENous Q6H PRN    heparin (porcine) injection 5,000 Units  5,000 Units SubCUTAneous Q12H    hydrALAZINE (APRESOLINE) 20 mg/mL injection 20 mg  20 mg IntraVENous Q4H PRN    glucose chewable tablet 16 g  16 g Oral PRN    glucagon (GLUCAGEN) injection 1 mg  1 mg IntraMUSCular PRN    dextrose 10% infusion 0-250 mL  0-250 mL IntraVENous PRN    dextrose 5% infusion  50 mL/hr IntraVENous CONTINUOUS    insulin lispro (HUMALOG) injection   SubCUTAneous Q6H       Labs:    Recent Labs     05/10/22  0239 05/09/22  03422   WBC 5.8 7.7 7.0   HGB 11.7* 11.1* 12.6*   HCT 35.3* 33.3* 37.8    184 189     Recent Labs     05/10/22  0239 05/09/22  03422    135* 134*   K 4.4 4.8 5.7*   CL 99* 100 98*   CO2 26 21 24   * 104* 87   BUN 37* 55* 52*   CREA 5.36* 6.97* 6.35*   CA 8.9 9.0 8.8   ALB 2.6* 2.4* 2.6*   ALT 25 24 27         Time spent on direct patient care >30 mints     Complexity : High complex - due to multiple medical issues outlined above.      CODE Status : Full code  Case discussed with:  [x]Patient  [x] Family patient's wife[]Nursing  [x]Case Management         Disclaimer: Sections of this note are dictated utilizing voice recognition software, which may have resulted in some phonetic based errors in grammar and contents. Even though attempts were made to correct all the mistakes, some may have been missed, and remained in the body of the document. If questions arise, please contact our department.     Signed By: David Florence MD     May 10, 2022

## 2022-05-11 LAB
ALBUMIN SERPL-MCNC: 2.3 G/DL (ref 3.4–5)
ALBUMIN/GLOB SERPL: 0.6 {RATIO} (ref 0.8–1.7)
ALP SERPL-CCNC: 141 U/L (ref 45–117)
ALT SERPL-CCNC: 20 U/L (ref 16–61)
AMMONIA PLAS-SCNC: 79 UMOL/L (ref 11–32)
ANION GAP SERPL CALC-SCNC: 11 MMOL/L (ref 3–18)
AST SERPL-CCNC: 25 U/L (ref 10–38)
BASOPHILS # BLD: 0.1 K/UL (ref 0–0.1)
BASOPHILS NFR BLD: 2 % (ref 0–2)
BILIRUB SERPL-MCNC: 1.3 MG/DL (ref 0.2–1)
BUN SERPL-MCNC: 25 MG/DL (ref 7–18)
BUN/CREAT SERPL: 6 (ref 12–20)
CALCIUM SERPL-MCNC: 8.3 MG/DL (ref 8.5–10.1)
CHLORIDE SERPL-SCNC: 92 MMOL/L (ref 100–111)
CO2 SERPL-SCNC: 27 MMOL/L (ref 21–32)
CREAT SERPL-MCNC: 4.49 MG/DL (ref 0.6–1.3)
DIFFERENTIAL METHOD BLD: ABNORMAL
EOSINOPHIL # BLD: 0.1 K/UL (ref 0–0.4)
EOSINOPHIL NFR BLD: 1 % (ref 0–5)
ERYTHROCYTE [DISTWIDTH] IN BLOOD BY AUTOMATED COUNT: 21.6 % (ref 11.6–14.5)
GLOBULIN SER CALC-MCNC: 3.9 G/DL (ref 2–4)
GLUCOSE BLD STRIP.AUTO-MCNC: 88 MG/DL (ref 70–110)
GLUCOSE BLD STRIP.AUTO-MCNC: 92 MG/DL (ref 70–110)
GLUCOSE BLD STRIP.AUTO-MCNC: 98 MG/DL (ref 70–110)
GLUCOSE SERPL-MCNC: 396 MG/DL (ref 74–99)
HCT VFR BLD AUTO: 31.2 % (ref 36–48)
HGB BLD-MCNC: 10.5 G/DL (ref 13–16)
IMM GRANULOCYTES # BLD AUTO: 0 K/UL (ref 0–0.04)
IMM GRANULOCYTES NFR BLD AUTO: 1 % (ref 0–0.5)
LACTATE SERPL-SCNC: 1.7 MMOL/L (ref 0.4–2)
LYMPHOCYTES # BLD: 0.9 K/UL (ref 0.9–3.6)
LYMPHOCYTES NFR BLD: 20 % (ref 21–52)
MCH RBC QN AUTO: 29.6 PG (ref 24–34)
MCHC RBC AUTO-ENTMCNC: 33.7 G/DL (ref 31–37)
MCV RBC AUTO: 87.9 FL (ref 78–100)
MONOCYTES # BLD: 1 K/UL (ref 0.05–1.2)
MONOCYTES NFR BLD: 23 % (ref 3–10)
NEUTS SEG # BLD: 2.4 K/UL (ref 1.8–8)
NEUTS SEG NFR BLD: 53 % (ref 40–73)
NRBC # BLD: 0 K/UL (ref 0–0.01)
NRBC BLD-RTO: 0 PER 100 WBC
PLATELET # BLD AUTO: 158 K/UL (ref 135–420)
PMV BLD AUTO: 10.1 FL (ref 9.2–11.8)
POTASSIUM SERPL-SCNC: 3.5 MMOL/L (ref 3.5–5.5)
PROT SERPL-MCNC: 6.2 G/DL (ref 6.4–8.2)
RBC # BLD AUTO: 3.55 M/UL (ref 4.35–5.65)
SODIUM SERPL-SCNC: 130 MMOL/L (ref 136–145)
WBC # BLD AUTO: 4.4 K/UL (ref 4.6–13.2)

## 2022-05-11 PROCEDURE — 83605 ASSAY OF LACTIC ACID: CPT

## 2022-05-11 PROCEDURE — 99232 SBSQ HOSP IP/OBS MODERATE 35: CPT | Performed by: INTERNAL MEDICINE

## 2022-05-11 PROCEDURE — 36415 COLL VENOUS BLD VENIPUNCTURE: CPT

## 2022-05-11 PROCEDURE — 65270000046 HC RM TELEMETRY

## 2022-05-11 PROCEDURE — 81001 URINALYSIS AUTO W/SCOPE: CPT

## 2022-05-11 PROCEDURE — 74011250636 HC RX REV CODE- 250/636: Performed by: INTERNAL MEDICINE

## 2022-05-11 PROCEDURE — 74011000250 HC RX REV CODE- 250: Performed by: INTERNAL MEDICINE

## 2022-05-11 PROCEDURE — 82140 ASSAY OF AMMONIA: CPT

## 2022-05-11 PROCEDURE — 97161 PT EVAL LOW COMPLEX 20 MIN: CPT

## 2022-05-11 PROCEDURE — 80053 COMPREHEN METABOLIC PANEL: CPT

## 2022-05-11 PROCEDURE — 90935 HEMODIALYSIS ONE EVALUATION: CPT

## 2022-05-11 PROCEDURE — 95819 EEG AWAKE AND ASLEEP: CPT

## 2022-05-11 PROCEDURE — 82962 GLUCOSE BLOOD TEST: CPT

## 2022-05-11 PROCEDURE — 97116 GAIT TRAINING THERAPY: CPT

## 2022-05-11 PROCEDURE — 85025 COMPLETE CBC W/AUTO DIFF WBC: CPT

## 2022-05-11 RX ORDER — INSULIN LISPRO 100 [IU]/ML
INJECTION, SOLUTION INTRAVENOUS; SUBCUTANEOUS
Status: DISCONTINUED | OUTPATIENT
Start: 2022-05-11 | End: 2022-05-12 | Stop reason: HOSPADM

## 2022-05-11 RX ADMIN — HYDRALAZINE HYDROCHLORIDE 20 MG: 20 INJECTION, SOLUTION INTRAMUSCULAR; INTRAVENOUS at 17:09

## 2022-05-11 RX ADMIN — SODIUM CHLORIDE, PRESERVATIVE FREE 10 ML: 5 INJECTION INTRAVENOUS at 22:32

## 2022-05-11 RX ADMIN — SODIUM CHLORIDE, PRESERVATIVE FREE 10 ML: 5 INJECTION INTRAVENOUS at 06:26

## 2022-05-11 RX ADMIN — HEPARIN SODIUM 5000 UNITS: 5000 INJECTION INTRAVENOUS; SUBCUTANEOUS at 16:59

## 2022-05-11 RX ADMIN — ONDANSETRON 4 MG: 2 INJECTION INTRAMUSCULAR; INTRAVENOUS at 16:58

## 2022-05-11 RX ADMIN — HEPARIN SODIUM 5000 UNITS: 5000 INJECTION INTRAVENOUS; SUBCUTANEOUS at 04:30

## 2022-05-11 RX ADMIN — HYDRALAZINE HYDROCHLORIDE 20 MG: 20 INJECTION, SOLUTION INTRAMUSCULAR; INTRAVENOUS at 00:55

## 2022-05-11 RX ADMIN — SODIUM CHLORIDE, PRESERVATIVE FREE 10 ML: 5 INJECTION INTRAVENOUS at 17:00

## 2022-05-11 RX ADMIN — ONDANSETRON 4 MG: 2 INJECTION INTRAMUSCULAR; INTRAVENOUS at 10:37

## 2022-05-11 NOTE — PROGRESS NOTES
Problem: Mobility Impaired (Adult and Pediatric)  Goal: *Acute Goals and Plan of Care (Insert Text)  Description: Physical Therapy Goals  Initiated 5/11/2022 and to be accomplished within 7 day(s)  1. Patient will move from supine to sit and sit to supine , scoot up and down, and roll side to side in bed with modified independence. 2.  Patient will transfer from bed to chair and chair to bed with modified independence using the least restrictive device. 3.  Patient will perform sit to stand with modified independence. 4.  Patient will ambulate with modified independence for 100 feet with the least restrictive device. 5.  Patient will ascend/descend 3 stairs with unilateral handrail(s) with CGA     PLOF: Pt reporting he lives with wife in 1 story house with 3 ROMANA with handrails. Pt uses SPC in house, RW in bathroom for assist due to low height. Pt reporting intermittent assist from wife for stands from toilet and assist getting out of bed. Reporting 3 falls in the last 2 months. Outcome: Progressing Towards Goal   PHYSICAL THERAPY EVALUATION    Patient: Emile Collado (69 y.o. male)  Date: 5/11/2022  Primary Diagnosis: Encephalopathy [Z64.62]  Acute metabolic encephalopathy [M31.39]        Precautions:  Fall    ASSESSMENT :  Pt cleared to participate in PT session, pt received sitting EOB and agreeable to therapy session. Based on the objective data described below, the patient presents with decreased endurance, decreased strength, decreased balance reactions, gait deviations, and decreased independence in functional mobility. Pt reporting nausea throughout session. Requesting to toilet. Standing to RW with CGA, ambulating with CGA and RW x10 feet to toilet. Given urinal due to need for urine sample. Pt unable to void, standing with support and SBA. Pt ambulated back to bed with CGA, RW, with narrowed base of support. Pt eager to continue with evaluation but with increased hiccups and nausea.  Pt requesting to stay sitting on EOB. Pt positioned for comfort and educated to call for assist before getting up, pt verbalized understanding. Pt left with all needs met and call bell in reach. RN notified of position and participation. Patient will benefit from skilled intervention to address the above impairments. Patient's rehabilitation potential is considered to be Fair  Factors which may influence rehabilitation potential include:   []         None noted  []         Mental ability/status  [x]         Medical condition  []         Home/family situation and support systems  []         Safety awareness  []         Pain tolerance/management  []         Other:      PLAN :  Recommendations and Planned Interventions:   [x]           Bed Mobility Training             []    Neuromuscular Re-Education  [x]           Transfer Training                   []    Orthotic/Prosthetic Training  [x]           Gait Training                          []    Modalities  [x]           Therapeutic Exercises           []    Edema Management/Control  [x]           Therapeutic Activities            [x]    Family Training/Education  [x]           Patient Education  []           Other (comment):    Frequency/Duration: Patient will be followed by physical therapy 1-2 times per day/4-7 days per week to address goals. Discharge Recommendations: Home Health with increased support from wife at home  Further Equipment Recommendations for Discharge: rolling walker, 3 in 1 BSC     SUBJECTIVE:   Patient stated I can get up just fine, I don't need to have help.     OBJECTIVE DATA SUMMARY:     Past Medical History:   Diagnosis Date    Cholecystitis     Chronic kidney disease     on , on Home HD    Diabetes ([facility] [address]     High cholesterol     Hypertension     no meds now    Nausea & vomiting     pt needs Scopolamine    Neuropathy      Past Surgical History:   Procedure Laterality Date    COLONOSCOPY N/A 12/31/2020    COLONOSCOPY performed by Luz Rae Olegario Pereira MD at [facility]    HX CHOLECYSTECTOMY      HX HERNIA REPAIR      HX ORTHOPAEDIC      jaw surgery    HX ORTHOPAEDIC      left foot    HX WISDOM TEETH EXTRACTION      NEUROLOGICAL PROCEDURE UNLISTED  2020    Lumbar fusion    VASCULAR SURGERY PROCEDURE UNLIST  06/2020    Left Arm AV Fistula     Barriers to Learning/Limitations: None  Compensate with: N/A  Home Situation:  Home Situation  Home Environment: Private residence  # Steps to Enter: 4  Rails to Enter: Yes  Wheelchair Ramp: No  One/Two Story Residence: One story  Living Alone: No  Support Systems: Spouse/Significant Other  Patient Expects to be Discharged to[de-identified] Home with home health  Current DME Used/Available at Home: Kathy Martins, rolling,Cane, straight  Critical Behavior:  Neurologic State: Alert  Orientation Level: Oriented X4  Cognition: Appropriate decision making  Safety/Judgement: Awareness of environment; Fall prevention  Psychosocial  Patient Behaviors: Calm; Cooperative  Family  Behaviors: Supportive  Needs Expressed: Educational  Purposeful Interaction: Yes  Pt Identified Daily Priority: Clinical issues (comment)  Caritas Process: Nurture loving kindness;Establish trust;Teaching/learning  Caring Interventions: Reassure; Therapeutic modalities  Reassure: Therapeutic listening; Informing;Caring rounds  Therapeutic Modalities: Humor; Intentional therapeutic touch  Skin Condition/Temp: Dry;Warm  Family  Behaviors: Supportive  Skin Integrity: Intact  Skin Integumentary  Skin Color: Appropriate for ethnicity  Skin Condition/Temp: Dry;Warm  Skin Integrity: Intact  Turgor: Non-tenting  Nails: Exceptions to WDL  Exceptions to WDL: Discolored     Strength:    Strength: Generally decreased, functional    Tone & Sensation:   Tone: Normal    Sensation: Intact    Range Of Motion:  AROM: Within functional limits    Posture:  Posture (WDL): Within defined limits     Functional Mobility:  Bed Mobility:     Supine to Sit:  (found sitting on EOB )     Scooting: Stand-by assistance  Transfers:  Sit to Stand: Contact guard assistance  Stand to Sit: Contact guard assistance     Balance:   Sitting: Intact  Standing: Impaired; With support  Standing - Static: Good  Standing - Dynamic : Fair (+)    Ambulation/Gait Training:  Distance (ft): 10 Feet (ft) (x2)  Assistive Device: Walker, rolling  Ambulation - Level of Assistance: Minimal assistance     Gait Description (WDL): Exceptions to WDL  Gait Abnormalities: Decreased step clearance    Base of Support: Center of gravity altered;Narrowed     Speed/Natacha: Slow;Shuffled  Step Length: Right shortened;Left shortened    Pain:  Pain level pre-treatment: 0/10   Pain level post-treatment: 0/10     Activity Tolerance:   Fair tolerance, limited by nausea this session     Please refer to the flowsheet for vital signs taken during this treatment. After treatment:   []         Patient left in no apparent distress sitting up in chair  [x]         Patient left in no apparent distress in bed  [x]         Call bell left within reach  [x]         Nursing notified  []         Caregiver present  []         Bed alarm activated  []         SCDs applied    COMMUNICATION/EDUCATION:   [x]         Role of Physical Therapy in the acute care setting. [x]         Fall prevention education was provided and the patient/caregiver indicated understanding. [x]         Patient/family have participated as able in goal setting and plan of care. [x]         Patient/family agree to work toward stated goals and plan of care. []         Patient understands intent and goals of therapy, but is neutral about his/her participation. []         Patient is unable to participate in goal setting/plan of care: ongoing with therapy staff.  []         Other:     Thank you for this referral.  Saritha Clemons, PT   Time Calculation: 24 mins      Eval Complexity: History: MEDIUM  Complexity : 1-2 comorbidities / personal factors will impact the outcome/ POC Exam:LOW Complexity : 1-2 Standardized tests and measures addressing body structure, function, activity limitation and / or participation in recreation  Presentation: LOW Complexity : Stable, uncomplicated  Clinical Decision Making:Low Complexity low  Overall Complexity:LOW

## 2022-05-11 NOTE — PROGRESS NOTES
Patient awake  A/O x 3. Wife at bedside want to know if patient can eat. Paged physician.  Ok with bedside swallow beatrice and to have renal diet if there is no issues

## 2022-05-11 NOTE — PROGRESS NOTES
Problem: Pressure Injury - Risk of  Goal: *Prevention of pressure injury  Description: Document Trevor Scale and appropriate interventions in the flowsheet. Outcome: Progressing Towards Goal  Note: Pressure Injury Interventions:  Sensory Interventions: Assess changes in LOC    Moisture Interventions: Absorbent underpads,Check for incontinence Q2 hours and as needed,Minimize layers    Activity Interventions: Increase time out of bed,Pressure redistribution bed/mattress(bed type),PT/OT evaluation    Mobility Interventions: Pressure redistribution bed/mattress (bed type),PT/OT evaluation    Nutrition Interventions: Document food/fluid/supplement intake    Friction and Shear Interventions: Apply protective barrier, creams and emollients                Problem: Patient Education: Go to Patient Education Activity  Goal: Patient/Family Education  Outcome: Progressing Towards Goal     Problem: Falls - Risk of  Goal: *Absence of Falls  Description: Document Keisha Fall Risk and appropriate interventions in the flowsheet.   Outcome: Progressing Towards Goal  Note: Fall Risk Interventions:  Mobility Interventions: Bed/chair exit alarm,PT Consult for mobility concerns,PT Consult for assist device competence,Utilize walker, cane, or other assistive device    Mentation Interventions: Door open when patient unattended,Reorient patient,Family/sitter at bedside    Medication Interventions: Bed/chair exit alarm    Elimination Interventions: Bed/chair exit alarm,Call light in reach,Patient to call for help with toileting needs,Urinal in reach    History of Falls Interventions: Bed/chair exit alarm,Door open when patient unattended         Problem: Patient Education: Go to Patient Education Activity  Goal: Patient/Family Education  Outcome: Progressing Towards Goal     Problem: Pain  Goal: *Control of Pain  Outcome: Progressing Towards Goal     Problem: Chronic Renal Failure  Goal: *Fluid and electrolytes stabilized  Outcome: Progressing Towards Goal     Problem: Patient Education: Go to Patient Education Activity  Goal: Patient/Family Education  Outcome: Progressing Towards Goal     Problem: Patient Education: Go to Patient Education Activity  Goal: Patient/Family Education  Outcome: Progressing Towards Goal

## 2022-05-11 NOTE — PROGRESS NOTES
Physician Progress Note      PATIENT:               Tereza Esquivel  CSN #:                  [id number]  :                       1970  ADMIT DATE:       2022 8:42 PM  [address] DATE:  RESPONDING  PROVIDER #:        Lucas SANCHEZ MD          QUERY TEXT:    Patient admitted with metabolic encephalopathy, noted to have elevated ammonia of 131. If possible, please document in progress notes and discharge summary if you are evaluating and/or treating any of the following: The medical record reflects the following:  Risk Factors: Chronic renal failure  Clinical Indicators: Ammonia 131  5/10  PN: AMS/acute metabolic encephalopathy;  Likely causes-encephalopathy secondary to high ammonia level which improved after hemodialysis and patient's mentation improved, also MRI is suggestive of small vessel disease questionable early vascular dementia? Treatment: Lactulose rectal enema; follow ammonia levels    Thank you,  Nena Roland [email or web address]  Options provided:  -- Metabolic encephalopathy secondary to Hyperammoniemia  -- Other - I will add my own diagnosis  -- Disagree - Not applicable / Not valid  -- Disagree - Clinically unable to determine / Unknown  -- Refer to Clinical Documentation Reviewer    PROVIDER RESPONSE TEXT:    This patient has metabolic encephalopathy secondary to hyperammonemia.     Query created by: Mary Manzano on 2022 11:39 AM      Electronically signed by:  Basil Ramires MD 2022 12:22 PM

## 2022-05-11 NOTE — PROGRESS NOTES
[facility] Hospitalist Group  Progress Note    Patient: Rosalba Mustafa Age: 46 y.o. : 1970 MR#: [id number] SSN: [id number]  Date/Time: 2022     C/C: AMS        Subjective:   HPI : 26-year-old male past history of end-stage renal disease on home dialysis, admitted with altered mental status noticed by 3 days by his wife, for fever chills rigors no other symptoms. No focal weakness. Patient had high ammonia level on admission exam was unremarkable              Review of Systems:  Per ROS cannot be obtained due to patient's mental condition     Assessment/Plan:      1. AMS/acute metabolic encephalopathy  2 end-stage renal disease on hemodialysis at home  3 anemia of chronic illness  4 DM2  5 H/o HTN      PLAN  -Patient appears to be at baseline, marked improvement in acute metabolic encephalopathy with improvement in ammonia level and other appropriate dialysis treatment.    Patient's wife in room with patient Case discussed with her in detail, I have some questions regarding his home dialysis unit, I also discussed with Dr. Yasir Reed regarding this he is going to contact dialysis company, get follow-up ammonia levels as an outpatient.    -Follow EEG report     -Continue HD per renal Case discussed with renal     -SSI for diabetes control     - low blood glucose and NPO so low rate D5W -we will discontinue D5W now patient is more alert awake oriented and he can eat diet order    -      Discussed with patient's wife in room with patient       Objective:         General:   Alert awake oriented x2  HEENT: No facial asymmetry, ÁNGEL Obdulio, External ears - WNL    Cardiovascular: S1S2 - regular , No Murmur   Pulmonary: Equal expansion , No Use of accessory muscles , No Rales No Rhonchi    GI:  +BS in all four quadrants, soft, non-tender  Extremities:  No edema; 2+ dorsalis pedis pulses bilaterally  Neuro:  Alert awake oriented but slightly sleepy no focal deficit        DVT Prophylaxis: []Lovenox  [x]Hep SQ  []SCDs  []Coumadin   []On Heparin gtt     [] Eliquis [] Xarelto       Vitals:         VS:   Visit Vitals  BP (!) 197/113   Pulse 82   Temp 97.8 °F (36.6 °C)   Resp 20   Wt 107.1 kg (236 lb 1.8 oz)   SpO2 97%   BMI 32.93 kg/m²      Tmax/24hrs: Temp (24hrs), Av.7 °F (36.5 °C), Min:97.2 °F (36.2 °C), Max:98.5 °F (36.9 °C)        Medications:   Current Facility-Administered Medications   Medication Dose Route Frequency    sodium chloride (NS) flush 5-40 mL  5-40 mL IntraVENous Q8H    sodium chloride (NS) flush 5-40 mL  5-40 mL IntraVENous PRN    acetaminophen (TYLENOL) tablet 650 mg  650 mg Oral Q6H PRN    Or    acetaminophen (TYLENOL) suppository 650 mg  650 mg Rectal Q6H PRN    polyethylene glycol (MIRALAX) packet 17 g  17 g Oral DAILY PRN    ondansetron (ZOFRAN) injection 4 mg  4 mg IntraVENous Q6H PRN    heparin (porcine) injection 5,000 Units  5,000 Units SubCUTAneous Q12H    hydrALAZINE (APRESOLINE) 20 mg/mL injection 20 mg  20 mg IntraVENous Q4H PRN    glucose chewable tablet 16 g  16 g Oral PRN    glucagon (GLUCAGEN) injection 1 mg  1 mg IntraMUSCular PRN    dextrose 10% infusion 0-250 mL  0-250 mL IntraVENous PRN    insulin lispro (HUMALOG) injection   SubCUTAneous Q6H       Labs:    Recent Labs     05/11/22  0313 05/10/22  0239 22  0340   WBC 4.4* 5.8 7.7   HGB 10.5* 11.7* 11.1*   HCT 31.2* 35.3* 33.3*    199 184     Recent Labs     05/11/22  0313 05/10/22  1725 05/10/22  0239 22  0340   *  --  136 135*   K 3.5  --  4.4 4.8   CL 92*  --  99* 100   CO2 27  --  26 21   *  --  135* 104*   BUN 25*  --  37* 55*   CREA 4.49*  --  5.36* 6.97*   CA 8.3*  --  8.9  8.9 9.0   PHOS  --  3.4  --   --    ALB 2.3*  --  2.6* 2.4*   ALT 20  --  25 24         Time spent on direct patient care >30 mints     Complexity : High complex - due to multiple medical issues outlined above.      CODE Status : Full code    Case discussed with:  [x]Patient  [x] Family wife[]Nursing  []Case Management       Disclaimer: Sections of this note are dictated utilizing voice recognition software, which may have resulted in some phonetic based errors in grammar and contents. Even though attempts were made to correct all the mistakes, some may have been missed, and remained in the body of the document. If questions arise, please contact our department.     Signed By: Kelli De Paz MD     May 11, 2022

## 2022-05-11 NOTE — PROGRESS NOTES
Patient is off the floor (dialysis). Patient is not available to be assessed at this time.       Miryam Souza [phone] 1.74

## 2022-05-11 NOTE — PROGRESS NOTES
PT orders received and chart reviewed. PT eval attempted at 1536. Pt currently off floor at dialyssi. Will continue to follow.      Thank you for this referral.   Davey Carias PT DPT

## 2022-05-11 NOTE — PROGRESS NOTES
Problem: Pressure Injury - Risk of  Goal: *Prevention of pressure injury  Description: Document Trevor Scale and appropriate interventions in the flowsheet. Outcome: Progressing Towards Goal  Note: Pressure Injury Interventions:  Sensory Interventions: Assess changes in LOC    Moisture Interventions: Absorbent underpads    Activity Interventions: Pressure redistribution bed/mattress(bed type)    Mobility Interventions: Pressure redistribution bed/mattress (bed type)    Nutrition Interventions: Discuss nutritional consult with provider    Friction and Shear Interventions: Apply protective barrier, creams and emollients                Problem: Falls - Risk of  Goal: *Absence of Falls  Description: Document Keisha Fall Risk and appropriate interventions in the flowsheet.   Outcome: Progressing Towards Goal  Note: Fall Risk Interventions:       Mentation Interventions: Adequate sleep, hydration, pain control,Bed/chair exit alarm    Medication Interventions: Bed/chair exit alarm    Elimination Interventions: Bed/chair exit alarm,Call light in reach    History of Falls Interventions: Bed/chair exit alarm         Problem: Pain  Goal: *Control of Pain  Outcome: Progressing Towards Goal     Problem: Chronic Renal Failure  Goal: *Fluid and electrolytes stabilized  Outcome: Progressing Towards Goal

## 2022-05-11 NOTE — ROUTINE PROCESS
Bedside and Verbal shift change report given to Margie Reed RN (oncoming nurse) by Praveena Medeiros RN (offgoing nurse). Report included the following information SBAR, Kardex, MAR and Recent Results.

## 2022-05-11 NOTE — PROGRESS NOTES
In Patient Progress note    Admit Date: 5/8/2022     Impression:     #1 End-stage renal disease on home hemodialysis,followed by Dr. Kai Gilliam at [facility], Home unit. Patient gets home hemodialysis 5 days a week, his last dialysis was on Saturday for 3.5 hours 3.5 L UF. He appears hypertensive and slightly overloaded. His mental status and volume status have improved with dialysis   #2 altered mental status, metabolic encephalopathy, improved with daily HD , etiology ? Hyperammonemia . Will recheck ammonia outpatient and if high may need f/u with GI /hepatology  #3 hypertension, uncontrolled  #4 diabetes mellitus  #5 hyperkalemia improved  #6 secondary hyperparathyroidism, check PTH and phosphorus     Plan:  #1 Daily hemodialysis to see if it improves his mental status--> 2 hrs HD today  #2 f/u neurology recs   #3 no need of RIGOBERTO as hemoglobin at goal  #4 continue BP meds  #5 lactulose per primary team for hyperammonemia     Plan discussed with Dr. Chiquita Celeste,     Please call with questions     Inocente Gimenez MD [facility]  Cell [phone]  Pager: [phone]     Subjective:     - improved mental status   - respiratory - stable  - hemodynamics - stable, no pressrs  - UOP-minimal  - Nutrition -poor    Objective:     Visit Vitals  BP (!) 197/99 (BP 1 Location: Right upper arm, BP Patient Position: Sitting)   Pulse 87   Temp (!) 96.6 °F (35.9 °C)   Resp 18   Wt 107.1 kg (236 lb 1.8 oz)   SpO2 95%   BMI 32.93 kg/m²         Intake/Output Summary (Last 24 hours) at 5/11/2022 1807  Last data filed at 5/11/2022 1525  Gross per 24 hour   Intake 1000 ml   Output 2000 ml   Net -1000 ml       Physical Exam:     More awake , answering some questions   HEENT: mmm  Neck: no cervical lymphadenopathy or thyromegaly. Lungs: good air entry, clear to auscultation bilaterally. Cardiovascular system: S1, S2, regular rate and rhythm. Abdomen: soft, non tender, non distended.   Extremities: 1+ le edema     Data Review:    Recent Labs 05/11/22 0313   WBC 4.4*   RBC 3.55*   HCT 31.2*   MCV 87.9   MCH 29.6   MCHC 33.7   RDW 21.6*     Recent Labs     05/11/22  0313 05/10/22  1725 05/10/22  0239 05/09/22 0340 05/08/22 2055   BUN 25*  --  37* 55* 52*   CREA 4.49*  --  5.36* 6.97* 6.35*   CA 8.3*  --  8.9  8.9 9.0 8.8   ALB 2.3*  --  2.6* 2.4* 2.6*   K 3.5  --  4.4 4.8 5.7*   *  --  136 135* 134*   CL 92*  --  99* 100 98*   CO2 27  --  26 21 24   PHOS  --  3.4  --   --   --    *  --  135* 104* 87       Christal St MD

## 2022-05-11 NOTE — DIALYSIS
ACUTE HEMODIALYSIS TREATMENT    HEMODIALYSIS ORDERS: Physician: Dr. Cristina Ferrer: Alina   Duration: 2 hr   BFR: 350   DFR: 600   Dialysate:  Temp 36-37*C   K+  3    Ca+ 3.0   Na 140   Bicarb 35   Wt Readings from Last 1 Encounters:   05/10/22 107.1 kg (236 lb 1.8 oz)    Patient Chart [x]   Unable to Obtain []  Dry weight/UF Goal: 2000 ml    Heparin []  Bolus    Units    [] Hourly    Units    [x]None       Pre BP:   170/116   Pulse:  81   Respirations: 20   Temp:  97.3  [] Oral [] Axillary [] Esophageal   Labs: []  Pre  []  Post:   [x] N/A   Additional Orders(medications, blood products, hypotension management): [] Yes   [] No     [x]  [facility] Consent Verified     GRAFT/FISTULA ACCESS:   []N/A     [x]Right     []Left     []UE     []LE   []AVG   [x]AVF       [x]Medical Aseptic Prep Utilized   [x]No S/S infection  []Redness  []Drainage [] Cultured  [] Swelling  [] Pain  Bruit:   [x] Strong    [] Weak       Thrill :   [x] Strong    [] Weak     Needle Gauge: 15   Length: 1 inch   If access problem,  notified: []Yes     [x]N/A          GENERAL ASSESSMENT:    LUNGS:  Resp Rate 18   [x] Clear  [] Coarse  [] Crackles  [] Wheezing  [] Diminished         Respirations:  [x]Easy  []Labored  []N/A  Cough:  []Productive  []Dry  [x]N/A      Therapy:  [x]RA  O2 Type:  []NC  Mask: []  NRB    [] BiPaP  Flow:  l/min                   [] Ventilated  [] Intubated  [] Trach     CARDIAC: [x] Regular      [] Irregular   [] Rhythm:          [] Monitored   [] Bedside   [] Remotely monitored     EDEMA: [] None   [x]Generalized  [] Pitting [] 1+   [] 2 +   [] 3+    [] 4+  [] Pedal    SKIN:   [x] Warm  [] Hot     [] Cold   [x] Dry     [] Pale   [] Diaphoretic                  [] Flushed  [] Jaundiced  [] Cyanotic     LOC:    [x] Alert      [x]Oriented:    [x] Person     [x] Place  [x]Time               [] Confused  [] Lethargic  [] Medicated  [] Non-responsive  [] Non Verbal   GI / ABDOMEN:                     [] Flat [] Distended    [x] Soft    [] Firm   []  Obese                   [] Diarrhea   [] FMS [x] Bowel Sounds  [] Nausea  [] Vomiting                   [] NGT  [] OGT    [] PEG  [] Tube Feedings @     / URINE ASSESSMENT:                   [] Voiding  []  [place]  [] Oliguria  [] Anuria                     [] Incontinent  []  Incontinent Brief   []  PureWick     PAIN:  [x] 0 []1  []2   []3   []4   []5   []6   []7   []8   []9   []10                MOBILITY:  [x] Bed    [] Stretcher      All Vitals and Treatment Details on Attached [address]: [facility]          Room # 457/01    [x] Routine         [] 1st Time Acute/Chronic   [] Urgent      [] Stat            [x] Acute Room   []  Bedside    [] ICU/CCU     [] ER   Isolation Precautions:  [x] Dialysis    There are currently no Active Isolations     ALLERGIES:     Allergies   Allergen Reactions    Opioids - Morphine Analogues Swelling     \"stops my body from recovering\"      Amitriptyline Other (comments)     Myalgia noted [facility] records  GI Distress      Amoxicillin Nausea and Vomiting    Calcium Other (comments)     \"doesn't metabolize in my system quick enough by the time I take the next dose\"    Calcium Channel Blocking Agent Diltiazem Analogues Other (comments)    Canagliflozin Other (comments)    Empagliflozin Other (comments)    Gabapentin Nausea and Vomiting and Other (comments)     Urinary retention    Metformin Nausea and Vomiting    Nifedipine Other (comments)    Oxycodone Hcl-Oxycodone-Asa Other (comments)     \"doesn't metabolize in my system quick enough by the time I take the next dose\"  Gi Distress        Code Status:  Full Code     Hepatitis Status      Lab Results   Component Value Date/Time    Hepatitis B surface Ag <0.10 05/09/2022 11:19 AM    Hepatitis B surface Ab 91.01 05/09/2022 11:19 AM        Current Labs:      Lab Results   Component Value Date/Time    WBC 4.4 (L) 05/11/2022 03:13 AM    HGB 10.5 (L) 05/11/2022 03:13 AM    HCT 31.2 (L) 05/11/2022 03:13 AM    PLATELET 559 11/09/5531 03:13 AM    MCV 87.9 05/11/2022 03:13 AM     Lab Results   Component Value Date/Time    Sodium 130 (L) 05/11/2022 03:13 AM    Potassium 3.5 05/11/2022 03:13 AM    Chloride 92 (L) 05/11/2022 03:13 AM    CO2 27 05/11/2022 03:13 AM    Anion gap 11 05/11/2022 03:13 AM    Glucose 396 (H) 05/11/2022 03:13 AM    BUN 25 (H) 05/11/2022 03:13 AM    Creatinine 4.49 (H) 05/11/2022 03:13 AM    BUN/Creatinine ratio 6 (L) 05/11/2022 03:13 AM    GFR est AA 17 (L) 05/11/2022 03:13 AM    GFR est non-AA 14 (L) 05/11/2022 03:13 AM    Calcium 8.3 (L) 05/11/2022 03:13 AM          DIET:  DIET ADULT     PRIMARY NURSE REPORT:   Pre Dialysis: Marion Veloz RN    Time: 3102      EDUCATION:    [x] Patient           Knowledge Basis: []None [x]Minimal [] Substantial [] Unknown  Barriers to learning  []N/A  [] Intubated/Trached/Ventilated  [] Sedated/Paralyzed   [] Access Care     [] S&S of infection  [] Fluid Management  [] K+   [x] Procedural    [] Medications   [] Tx Options   [] Transplant   [] Diet      Teaching Tools:  [x] Explain  [] Demo  [] Handouts [] Video  Patient response: [] Verbalized understanding   [x] Requires follow up        [x] Time Out/Safety Check    [x] Extracorporeal Circuit Tested for integrity       RO/HEMODIALYSIS MACHINE SAFETY CHECKS  Before each treatment:        [address] [place] [address]                                     [x] Unit Machine # 9 with centralized RO                                    [] Portable Machine #1/RO serial # [id number]                                  [] Portable Machine #2/RO serial # [id number]                                  [] Portable Machine #4/RO serial # [id number]                                  [] Portable Machine #10/RO serial #  [id number]                                                                                                         Alarm Test:  Pass time [id number]            [x] RO/Machine Log Complete    Machine Temp    36-37*C Dialysate: pH 7.4    Conductivity: Meter 14.2   HD Machine  14.3     TCD: 14.2  Dialyzer Lot # [id number]     Blood Tubing Lot # [id number]     Saline Lot # [id number]     CHLORINE TESTING-Before each treatment and every 4 hours    Total Chlorine: [x] less than 0.1 ppm  Initial Time Check: 1200       4 Hr/2nd Check Time: 1600   (if greater than 0.1 ppm from Primary then every 30 minutes from Secondary)     TREATMENT INITIATION  with Dialysis Precautions:   [x] All Connections Secured              [x] Saline Line Double Clamped   [x] Venous Parameters Set               [x] Arterial Parameters Set    [x] Prime Given 250ml NSS              [x]Air Foam Detector Engaged      Treatment Initiation Note:  Received  Patient in the unit, assessed and deemed stable for treatment. LUE AVF Accessed with no signs or symptoms of complication. Treatment initiated      During Treatment Notes:  1330  Vascular access visible with arterial and venous line connections intact. Pt resting comfortably. 1345  Vascular access visible with arterial and venous line connections intact. Pt resting comfortably. 1400  Vascular access visible with arterial and venous line connections intact. Pt resting comfortably. 1415  Vascular access visible with arterial and venous line connections intact. Pt resting comfortably. 1430  Vascular access visible with arterial and venous line connections intact. Pt resting comfortably. 1445  Vascular access visible with arterial and venous line connections intact. Pt resting comfortably. 1500  Vascular access visible with arterial and venous line connections intact. Pt resting comfortably. 1515  Vascular access visible with arterial and venous line connections intact. Pt resting comfortably. 1520  Dialysis treatment complete.        Medication Dose Volume Route Time David Nurse, Title      PRASANTH Lin RN HD Kathlyn Mocha, RN     Post Assessment  Dialyzer Cleared:   [] Good  [x] Fair  [] Poor  Blood processed:  45.3 L  UF Removed:  2000 Ml    Post /113   Pulse  82 Resp  20  Temp 97.8 Lungs: [x] Clear [] Course  [] Crackles                 []  Wheezing   [] Diminished   Post Tx Vascular Access: [] N/A  AVF/AVG: Bleeding stopped with  Arterial Pressure for 10 min   Venous Pressure for 10 min      Cardiac :[x] Regular   [] Irregular   Rhythm:  [] Monitored   [x] Not Monitored    CVC Catheter: [] N/A Edema:  [] None  [] Generalized                     Skin:[x] Warm  [x] Dry [] Diaphoretic               [] Flushed  [] Pale [] Cyanotic Pain:  [x]0  []1 []2  []3 []4  []5  []6  []7 []8    []9  []10     Post Treatment Note:    Patient completed and  Tolerated 2 hrs of hemo dialysis. 2000 ml of fluid removed Arterial and venous needles removed and pressure applied until bleeding stopped. Dry dressings applied. Bruit/Thrill present above and below dressings.       POST TREATMENT PRIMARY NURSE HANDOFF REPORT:   Post Dialysis: Higinio Castro RN               Time:  1600       Abbreviations: AVG-arterial venous graft, AVF-arterial venous fistula, IJ-Internal Jugular, Subcl-Subclavian, Fem-Femoral, Tx-treatment, AP/HR-apical heart rate, VSS- Vital Signs Stable, CVC- Central Venous Catheter, DFR-dialysate flow rate, BFR-blood flow rate, AP-arterial pressure, -venous pressure, UF-ultrafiltrate, TMP-transmembrane pressure, Ciro-Venous, Art-Arterial, RO-Reverse Osmosis

## 2022-05-12 VITALS
OXYGEN SATURATION: 100 % | RESPIRATION RATE: 18 BRPM | DIASTOLIC BLOOD PRESSURE: 96 MMHG | HEART RATE: 72 BPM | BODY MASS INDEX: 32.25 KG/M2 | SYSTOLIC BLOOD PRESSURE: 175 MMHG | WEIGHT: 231.26 LBS | TEMPERATURE: 97.8 F

## 2022-05-12 LAB
ALBUMIN SERPL-MCNC: 2.5 G/DL (ref 3.4–5)
ALBUMIN/GLOB SERPL: 0.6 {RATIO} (ref 0.8–1.7)
ALP SERPL-CCNC: 156 U/L (ref 45–117)
ALT SERPL-CCNC: 22 U/L (ref 16–61)
ANION GAP SERPL CALC-SCNC: 10 MMOL/L (ref 3–18)
APPEARANCE UR: CLEAR
AST SERPL-CCNC: 26 U/L (ref 10–38)
BASOPHILS # BLD: 0.1 K/UL (ref 0–0.1)
BASOPHILS NFR BLD: 2 % (ref 0–2)
BILIRUB SERPL-MCNC: 1.7 MG/DL (ref 0.2–1)
BILIRUB UR QL: NEGATIVE
BUN SERPL-MCNC: 24 MG/DL (ref 7–18)
BUN/CREAT SERPL: 5 (ref 12–20)
CALCIUM SERPL-MCNC: 8.9 MG/DL (ref 8.5–10.1)
CHLORIDE SERPL-SCNC: 100 MMOL/L (ref 100–111)
CO2 SERPL-SCNC: 27 MMOL/L (ref 21–32)
COLOR UR: YELLOW
CREAT SERPL-MCNC: 4.66 MG/DL (ref 0.6–1.3)
DIFFERENTIAL METHOD BLD: ABNORMAL
EOSINOPHIL # BLD: 0.1 K/UL (ref 0–0.4)
EOSINOPHIL NFR BLD: 3 % (ref 0–5)
EPITH CASTS URNS QL MICRO: NORMAL /LPF (ref 0–5)
ERYTHROCYTE [DISTWIDTH] IN BLOOD BY AUTOMATED COUNT: 22 % (ref 11.6–14.5)
GLOBULIN SER CALC-MCNC: 4.2 G/DL (ref 2–4)
GLUCOSE BLD STRIP.AUTO-MCNC: 124 MG/DL (ref 70–110)
GLUCOSE SERPL-MCNC: 95 MG/DL (ref 74–99)
GLUCOSE UR STRIP.AUTO-MCNC: 100 MG/DL
HCT VFR BLD AUTO: 33.6 % (ref 36–48)
HGB BLD-MCNC: 11.2 G/DL (ref 13–16)
HGB UR QL STRIP: NEGATIVE
IMM GRANULOCYTES # BLD AUTO: 0 K/UL (ref 0–0.04)
IMM GRANULOCYTES NFR BLD AUTO: 0 % (ref 0–0.5)
KETONES UR QL STRIP.AUTO: NEGATIVE MG/DL
LACTATE SERPL-SCNC: 1.9 MMOL/L (ref 0.4–2)
LEUKOCYTE ESTERASE UR QL STRIP.AUTO: NEGATIVE
LYMPHOCYTES # BLD: 0.9 K/UL (ref 0.9–3.6)
LYMPHOCYTES NFR BLD: 22 % (ref 21–52)
MCH RBC QN AUTO: 29.6 PG (ref 24–34)
MCHC RBC AUTO-ENTMCNC: 33.3 G/DL (ref 31–37)
MCV RBC AUTO: 88.9 FL (ref 78–100)
MONOCYTES # BLD: 1.1 K/UL (ref 0.05–1.2)
MONOCYTES NFR BLD: 26 % (ref 3–10)
NEUTS SEG # BLD: 2 K/UL (ref 1.8–8)
NEUTS SEG NFR BLD: 48 % (ref 40–73)
NITRITE UR QL STRIP.AUTO: NEGATIVE
NRBC # BLD: 0 K/UL (ref 0–0.01)
NRBC BLD-RTO: 0 PER 100 WBC
PH UR STRIP: >8.5 [PH] (ref 5–8)
PLATELET # BLD AUTO: 174 K/UL (ref 135–420)
PMV BLD AUTO: 9.7 FL (ref 9.2–11.8)
POTASSIUM SERPL-SCNC: 3.7 MMOL/L (ref 3.5–5.5)
PROT SERPL-MCNC: 6.7 G/DL (ref 6.4–8.2)
PROT UR STRIP-MCNC: 300 MG/DL
RBC # BLD AUTO: 3.78 M/UL (ref 4.35–5.65)
RBC #/AREA URNS HPF: NORMAL /HPF (ref 0–5)
SODIUM SERPL-SCNC: 137 MMOL/L (ref 136–145)
SP GR UR REFRACTOMETRY: 1.01 (ref 1–1.03)
UROBILINOGEN UR QL STRIP.AUTO: 1 EU/DL (ref 0.2–1)
WBC # BLD AUTO: 4.2 K/UL (ref 4.6–13.2)
WBC URNS QL MICRO: NORMAL /HPF (ref 0–4)

## 2022-05-12 PROCEDURE — 74011250637 HC RX REV CODE- 250/637: Performed by: STUDENT IN AN ORGANIZED HEALTH CARE EDUCATION/TRAINING PROGRAM

## 2022-05-12 PROCEDURE — 85025 COMPLETE CBC W/AUTO DIFF WBC: CPT

## 2022-05-12 PROCEDURE — 97165 OT EVAL LOW COMPLEX 30 MIN: CPT

## 2022-05-12 PROCEDURE — 74011000250 HC RX REV CODE- 250: Performed by: INTERNAL MEDICINE

## 2022-05-12 PROCEDURE — 82962 GLUCOSE BLOOD TEST: CPT

## 2022-05-12 PROCEDURE — 36415 COLL VENOUS BLD VENIPUNCTURE: CPT

## 2022-05-12 PROCEDURE — 74011250636 HC RX REV CODE- 250/636: Performed by: INTERNAL MEDICINE

## 2022-05-12 PROCEDURE — 83605 ASSAY OF LACTIC ACID: CPT

## 2022-05-12 PROCEDURE — 97535 SELF CARE MNGMENT TRAINING: CPT

## 2022-05-12 PROCEDURE — 99239 HOSP IP/OBS DSCHRG MGMT >30: CPT | Performed by: INTERNAL MEDICINE

## 2022-05-12 PROCEDURE — 80053 COMPREHEN METABOLIC PANEL: CPT

## 2022-05-12 PROCEDURE — 2709999900 HC NON-CHARGEABLE SUPPLY

## 2022-05-12 RX ORDER — CARVEDILOL 12.5 MG/1
12.5 TABLET ORAL 2 TIMES DAILY WITH MEALS
Status: DISCONTINUED | OUTPATIENT
Start: 2022-05-12 | End: 2022-05-12 | Stop reason: HOSPADM

## 2022-05-12 RX ORDER — ISOSORBIDE MONONITRATE 30 MG/1
30 TABLET, EXTENDED RELEASE ORAL DAILY
Status: DISCONTINUED | OUTPATIENT
Start: 2022-05-12 | End: 2022-05-12 | Stop reason: HOSPADM

## 2022-05-12 RX ORDER — HYDRALAZINE HYDROCHLORIDE 25 MG/1
25 TABLET, FILM COATED ORAL 3 TIMES DAILY
Status: DISCONTINUED | OUTPATIENT
Start: 2022-05-12 | End: 2022-05-12 | Stop reason: HOSPADM

## 2022-05-12 RX ORDER — CARVEDILOL 6.25 MG/1
6.25 TABLET ORAL 2 TIMES DAILY WITH MEALS
Status: DISCONTINUED | OUTPATIENT
Start: 2022-05-12 | End: 2022-05-12

## 2022-05-12 RX ORDER — CARVEDILOL 6.25 MG/1
6.25 TABLET ORAL ONCE
Status: DISCONTINUED | OUTPATIENT
Start: 2022-05-12 | End: 2022-05-12 | Stop reason: HOSPADM

## 2022-05-12 RX ORDER — DIPHENHYDRAMINE HCL 25 MG
25 CAPSULE ORAL ONCE
Status: COMPLETED | OUTPATIENT
Start: 2022-05-12 | End: 2022-05-12

## 2022-05-12 RX ADMIN — HEPARIN SODIUM 5000 UNITS: 5000 INJECTION INTRAVENOUS; SUBCUTANEOUS at 04:49

## 2022-05-12 RX ADMIN — HYDRALAZINE HYDROCHLORIDE 25 MG: 25 TABLET, FILM COATED ORAL at 09:34

## 2022-05-12 RX ADMIN — HYDRALAZINE HYDROCHLORIDE 20 MG: 20 INJECTION, SOLUTION INTRAMUSCULAR; INTRAVENOUS at 01:27

## 2022-05-12 RX ADMIN — SODIUM CHLORIDE, PRESERVATIVE FREE 10 ML: 5 INJECTION INTRAVENOUS at 06:42

## 2022-05-12 RX ADMIN — DIPHENHYDRAMINE HYDROCHLORIDE 25 MG: 25 CAPSULE ORAL at 02:25

## 2022-05-12 RX ADMIN — ISOSORBIDE MONONITRATE 30 MG: 30 TABLET, EXTENDED RELEASE ORAL at 09:35

## 2022-05-12 RX ADMIN — CARVEDILOL 6.25 MG: 6.25 TABLET, FILM COATED ORAL at 09:34

## 2022-05-12 NOTE — ROUTINE PROCESS
Bedside and Verbal shift change report given to David Anguiano LPN (oncoming nurse) by Kay Gil RN (offgoing nurse). Report included the following information SBAR, Kardex, MAR and Recent Results.

## 2022-05-12 NOTE — PROGRESS NOTES
Adding back patient's PO antihypertensive medications as he is now taking PO and continues to have an elevated blood pressure requiring PRN medications.

## 2022-05-12 NOTE — PROGRESS NOTES
Problem: Self Care Deficits Care Plan (Adult)  Goal: *Acute Goals and Plan of Care (Insert Text)  Outcome: Resolved/Met       OCCUPATIONAL THERAPY EVALUATION/DISCHARGE    Patient: Julieta Caal (28 y.o. male)  Date: 5/12/2022  Primary Diagnosis: Encephalopathy [X65.62]  Acute metabolic encephalopathy [O09.21]  Precautions: Fall,Skin  PLOF: Patient was independent with self-care and used a cane/RW for functional mobility PTA. Patinet currently receiving HH therapy d/t 3 falls in 2 months, 1 in shower d/t no grab bars/chair as unsteadiness. ASSESSMENT AND RECOMMENDATIONS:  Upon entering the room, the patient was seated edge of bed, alert, and agreeable to participate in OT evaluation with spouse present. Patient oriented x 4 but observed with slow processing/ response to questions throughout evaluation. Patient stand by assist for donning pants and standing this session,modified independent for upper body dressing. Patient with no shower chair at home and issued long handled sponge this session to increase independence/safety during bathing tasks. Patient spouse with concerns of patient falling out of bed and educated on putting raised body pillow underneath sheet on edge for barrier or getting rails. The patient presents with good static standing and fair+ dynamic standing balance, however will defer to PT for functional balance and functional mobility tasks. Based on the objective data described below, the patient presents with no deficits that impede pt function with ADLs. At this time patient is safe to d/c home with family support from selfcare standpoint. OT to d/c from caseload. Skilled occupational therapy is not indicated at this time.   Discharge Recommendations: Home Health with increased family supervision  Further Equipment Recommendations for Discharge: bedside commode, shower chair, and rolling walker      SUBJECTIVE:   Patient stated my vision is better, nothing is blurry now    OBJECTIVE DATA SUMMARY:     Past Medical History:   Diagnosis Date    Cholecystitis     Chronic kidney disease     on , on Home HD    Diabetes ([address]     High cholesterol     Hypertension     no meds now    Nausea & vomiting     pt needs Scopolamine    Neuropathy      Past Surgical History:   Procedure Laterality Date    COLONOSCOPY N/A 12/31/2020    COLONOSCOPY performed by Tacho Norman MD at [facility]    HX CHOLECYSTECTOMY      HX HERNIA REPAIR      HX ORTHOPAEDIC      jaw surgery    HX ORTHOPAEDIC      left foot    HX WISDOM TEETH EXTRACTION      NEUROLOGICAL PROCEDURE UNLISTED  2020    Lumbar fusion    VASCULAR SURGERY PROCEDURE UNLIST  06/2020    Left Arm AV Fistula     Barriers to Learning/Limitations: None  Compensate with: visual, verbal, tactile, kinesthetic cues/model    Home Situation:   Home Situation  Home Environment: Private residence  # Steps to Enter: 4  Rails to Enter: Yes  Wheelchair Ramp: No  One/Two Story Residence: One story  Living Alone: No  Support Systems: Spouse/Significant Other  Patient Expects to be Discharged to[de-identified] Home with home health  Current DME Used/Available at Home: Walker, rolling,Cane, straight  Tub or Shower Type: Shower (small threshold)  [x]     Right hand dominant   []     Left hand dominant    Cognitive/Behavioral Status:  Neurologic State: Alert  Orientation Level: Oriented X4  Cognition: Follows commands  Safety/Judgement: Fall prevention; Awareness of environment    Skin: Intact  Edema: None noted    Vision/Perceptual:    Acuity: Within Defined Limits    Corrective Lenses: Reading glasses    Coordination: BUE  Fine Motor Skills-Upper: Left Intact; Right Intact    Gross Motor Skills-Upper: Left Intact; Right Intact    Balance:  Sitting: Intact  Standing: Impaired  Standing - Static: Good  Standing - Dynamic : Fair (F+)    Strength: BUE  Strength: Generally decreased, functional     Tone & Sensation: BUE  Tone: Normal  Sensation: Intact    Range of Motion: BUE  AROM: Within functional limits     Functional Mobility and Transfers for ADLs:  Bed Mobility:  Scooting: Stand-by assistance    Transfers:  Sit to Stand: Stand-by assistance  Stand to Sit: Stand-by assistance    ADL Assessment:  Feeding: Independent    Oral Facial Hygiene/Grooming: Modified Independent    Bathing: Stand-by assistance; Adaptive equipment    Upper Body Dressing: Modified independent    Lower Body Dressing: Stand-by assistance    Toileting: Stand by assistance      ADL Intervention:  Upper Body Dressing Assistance  Dressing Assistance: Modified independent  Hospital Gown: Modified independent    Lower Body Dressing Assistance  Dressing Assistance: Stand-by assistance  Pants With Elastic Waist: Stand-by assistance  Socks: Stand-by assistance  Leg Crossed Method Used: Yes (prop sit)  Position Performed: Seated edge of bed;Standing    Cognitive Retraining  Safety/Judgement: Fall prevention; Awareness of environment      Pain:  Pain level pre-treatment: 0/10   Pain level post-treatment: 0/10   Pain Intervention(s): Medication (see MAR); Rest, Ice, Repositioning   Response to intervention: Nurse notified, See doc flow    Activity Tolerance:   Fair+    Please refer to the flowsheet for vital signs taken during this treatment. After treatment:   []  Patient left in no apparent distress sitting up in chair  [x]  Patient left in no apparent distress in bed  [x]  Call bell left within reach  [x]  Nursing notified  [x]  Spouse present  []  Bed alarm activated    COMMUNICATION/EDUCATION:   [x]      Role of Occupational Therapy in the acute care setting  [x]      Home safety education was provided and the patient/caregiver indicated understanding. [x]      Patient/family have participated as able and agree with findings and recommendations. []      Patient is unable to participate in plan of care at this time.     Thank you for this referral.  Aria Becerra OTR/L  Time Calculation: 22 mins      Guevara Complexity: History: MEDIUM Complexity : Expanded review of history including physical, cognitive and psychosocial  history ; Examination: LOW Complexity : 1-3 performance deficits relating to physical, cognitive , or psychosocial skils that result in activity limitations and / or participation restrictions ;    Decision Making:LOW Complexity : No comorbidities that affect functional and no verbal or physical assistance needed to complete eval tasks

## 2022-05-12 NOTE — DISCHARGE SUMMARY
Discharge Summary     Patient ID:  Libertad Palacios  [id number]  46 y.o.  1970  Body mass index is 32.25 kg/m². PCP on record: Pia Syed NP    Admit date: 5/8/2022  Discharge date and time: 5/12/2022      Reason for admission: AMS    Discharge Diagnoses:     1.  AMS/acute metabolic encephalopathy  2 end-stage renal disease on hemodialysis at home  3 anemia of chronic illness  4 DM2  5 H/o HTN                                             Consults: Neurology          Hospital Course by problems:  Per neuro:  \"52 year old male with history of multiple medical problems brought to hospital with mental status changes, patient on dialysis wife reported after recent dialysis patient lethargic and sleeping not eating drinking for a day had two episodes of fecal incontinence with worsening confusion, patient wife report confusion started in march with waxing and waning mental status but worse recently, had fluid overload, elevated blood pressure and ammonia level as well likely contributing to change in mental status. Patient very sleepy, not cooperate with exam, knows his name and followed few commands, able to move right lower and both upper extremity to some extent, wife noted limited left lower extremity movement for six months, myoclonic activity noted, no seizure reported. CT brain was unremarkable pending MRI brain result. \"    -Patient with altered mental status likely secondary to metabolic encephalopathy and borderline increase in ammonia level with improvement in pneumonia improvement in his blood pressure and improvement after dialysis his mental status improved to baseline. Renal Dr. Trent Workman follow-up patient, is also going to discuss patient's dialysis unit which is home dialysis unit, needs to be checked. -Further outpatient treatment I will defer to his PCP and renal      Patient seen and examined by me on discharge day.   Pertinent Findings:  Stable    Significant Diagnostic Studies:  EEG:      IMPRESSION:  This is an abnormal EEG due to the presence of mild diffuse slowing suggesting mild-to-moderate diffuse cortical neuronal dysfunction, could be seen secondary to toxic/hypoxic/ischemic, toxic/metabolic or postictal etiology, or medication effect. Further clinical correlation is suggested. There is also artifact mixed seen during this recording throughout this recording. Throughout this recording, there is diffuse delta and theta mixed activity seen. Pertinent Lab Data:  No results for input(s): WBC, HGB, HCT, PLT, HGBEXT, HCTEXT, PLTEXT, HGBEXT, HCTEXT, PLTEXT in the last 72 hours. No results for input(s): NA, K, CL, CO2, GLU, BUN, CREA, CA, MG, PHOS, ALB, TBIL, ALT, INR, INREXT, INREXT in the last 72 hours. No lab exists for component: SGOT    DISCHARGE MEDICATIONS:   @  Discharge Medication List as of 5/12/2022 12:49 PM      CONTINUE these medications which have NOT CHANGED    Details   sevelamer carbonate (RENVELA) 800 mg tab tab Take 800 mg by mouth three (3) times daily. , Historical Med      hydrALAZINE (APRESOLINE) 25 mg tablet Take 25 mg by mouth three (3) times daily. , Historical Med      !! folic acid/vit B complex and C (DIALYVITE PO) Take  by mouth daily. , Historical Med      OTHER,NON-FORMULARY, Please collect urine culture and sensitivity upon completion of Cipro., Print, Disp-1 Each, R-0      promethazine (PHENERGAN) 25 mg tablet Historical Med      NIFEdipine ER (ADALAT CC) 30 mg ER tablet Historical Med      ezetimibe (ZETIA) 10 mg tablet Historical Med      carvediloL (COREG) 6.25 mg tablet Historical Med      promethazine (Promethegan) 25 mg suppository Promethegan 25 mg rectal suppository   UNWRAP AND INSERT 1 SUPPOSITORY RECTALLY EVERY 6 HOURS AS NEEDED FOR NAUSEA OR VOMITING, Historical Med      docusate sodium (COLACE) 100 mg capsule Historical Med      bumetanide (BUMEX) 2 mg tablet Historical Med      omeprazole (PRILOSEC) 40 mg capsule Historical Med      isosorbide mononitrate ER (IMDUR) 30 mg tablet Historical Med      dicyclomine (BENTYL) 20 mg tablet TAKE 1 TABLET BY MOUTH THREE TIMES DAILY AS NEEDED FOR CONSTIPATION OR CRAMPING, Historical Med      imipramine (TOFRANIL) 25 mg tablet Take 25 mg by mouth nightly., Historical Med      Microlet Lancet misc USE AS DIRECTED TO TEST BLOOD GLUCOSE THREE TIMES DAILY, Historical Med, NOHELIA      ondansetron (ZOFRAN ODT) 4 mg disintegrating tablet Historical Med      senna-docusate (Senokot-S) 8.6-50 mg per tablet 1 tablet in the evening as needed, Historical Med      scopolamine (TRANSDERM-SCOP) 1 mg over 3 days pt3d 1 Patch by TransDERmal route every seventy-two (72) hours. Prn nausea, Print, Disp-10 Patch, R-0      lisinopriL (PRINIVIL, ZESTRIL) 20 mg tablet Take 20 mg by mouth daily. , Historical Med      lipase-protease-amylase (Creon) 12,000-38,000 -60,000 unit capsule Take 2 Caps by mouth three (3) times daily (with meals). , Historical Med      !! b complex-vitamin c-folic acid (Dialyvite) 100-1 mg tab tablet Take 1 Tab by mouth daily. , Historical Med      insulin glargine (LANTUS U-100 INSULIN) 100 unit/mL injection 80 Units by SubCUTAneous route nightly., Historical Med       !! - Potential duplicate medications found. Please discuss with provider. STOP taking these medications       ciprofloxacin HCl (CIPRO) 500 mg tablet Comments:   Reason for Stopping:         HYDROmorphone (DILAUDID) 2 mg tablet Comments:   Reason for Stopping:         Narcan 4 mg/actuation nasal spray Comments:   Reason for Stopping:                 My Recommended Diet, Activity, Wound Care, and follow-up labs are listed in the patient's Discharge Insturctions which I have personally completed and reviewed.       Disposition:     [x] Home with family     [] [place] PT/RN   [] SNF/NH   [] Inpatient Rehab/DARION  Condition at Discharge:  Stable    Follow up with:   PCP : She Baron NP      Please follow-up tests/labs that are still pendin. None  2.    >30 minutes spent coordinating this discharge (review instructions/follow-up, prescriptions, preparing report for sign off)    Disclaimer: Sections of this note are dictated utilizing voice recognition software, which may have resulted in some phonetic based errors in grammar and contents. Even though attempts were made to correct all the mistakes, some may have been missed, and remained in the body of the document. If questions arise, please contact our department.     Signed:  Vu Lama MD

## 2022-05-12 NOTE — PROGRESS NOTES
Discharge planning      Discharge order noted for today. Patient is active with [facility] agency. Met with patient and wife at bedside and are agreeable to the transition plan today. Transport has been arranged with wife Patient's home health  orders have been forwarded to [facility] via [place]. Updated bedside RN, Fili to the transition plan.   Discharge information has been documented on the AVS.       SKINNY Machado, RN  Pager # [phone]  Care Manager

## 2022-05-12 NOTE — PROGRESS NOTES
Problem: Pressure Injury - Risk of  Goal: *Prevention of pressure injury  Description: Document Trevor Scale and appropriate interventions in the flowsheet. Outcome: Progressing Towards Goal  Note: Pressure Injury Interventions:  Sensory Interventions: Assess changes in LOC    Moisture Interventions: Absorbent underpads,Check for incontinence Q2 hours and as needed,Minimize layers    Activity Interventions: Increase time out of bed,Pressure redistribution bed/mattress(bed type),PT/OT evaluation    Mobility Interventions: Pressure redistribution bed/mattress (bed type),PT/OT evaluation    Nutrition Interventions: Document food/fluid/supplement intake    Friction and Shear Interventions: Apply protective barrier, creams and emollients                Problem: Falls - Risk of  Goal: *Absence of Falls  Description: Document Keisha Fall Risk and appropriate interventions in the flowsheet. Outcome: Progressing Towards Goal  Note: Fall Risk Interventions:  Mobility Interventions: Bed/chair exit alarm,PT Consult for mobility concerns,PT Consult for assist device competence,Utilize walker, cane, or other assistive device    Mentation Interventions: Door open when patient unattended,Reorient patient,Family/sitter at bedside    Medication Interventions: Bed/chair exit alarm    Elimination Interventions: Bed/chair exit alarm,Call light in reach,Patient to call for help with toileting needs,Urinal in reach    History of Falls Interventions: Bed/chair exit alarm,Door open when patient unattended         Problem: Pain  Goal: *Control of Pain  Outcome: Progressing Towards Goal     Problem: Falls - Risk of  Goal: *Absence of Falls  Description: Document Jose Alberto Ralphs Fall Risk and appropriate interventions in the flowsheet.   Outcome: Progressing Towards Goal  Note: Fall Risk Interventions:  Mobility Interventions: Bed/chair exit alarm,PT Consult for mobility concerns,PT Consult for assist device competence,Utilize walker, cane, or other assistive device    Mentation Interventions: Door open when patient unattended,Reorient patient,Family/sitter at bedside    Medication Interventions: Bed/chair exit alarm    Elimination Interventions: Bed/chair exit alarm,Call light in reach,Patient to call for help with toileting needs,Urinal in reach    History of Falls Interventions: Bed/chair exit alarm,Door open when patient unattended         Problem:  Activity Intolerance  Goal: *Oxygen saturation during activity within specified parameters  Outcome: Progressing Towards Goal

## 2022-05-12 NOTE — DISCHARGE INSTRUCTIONS
DISCHARGE SUMMARY from Nurse    PATIENT INSTRUCTIONS:    After general anesthesia or intravenous sedation, for 24 hours or while taking prescription Narcotics:  · Limit your activities  · Do not drive and operate hazardous machinery  · Do not make important personal or business decisions  · Do  not drink alcoholic beverages  · If you have not urinated within 8 hours after discharge, please contact your surgeon on call. Report the following to your surgeon:  · Excessive pain, swelling, redness or odor of or around the surgical area  · Temperature over 100.5  · Nausea and vomiting lasting longer than 4 hours or if unable to take medications  · Any signs of decreased circulation or nerve impairment to extremity: change in color, persistent  numbness, tingling, coldness or increase pain  · Any questions    What to do at Home:  Recommended activity: Activity as tolerated, ***    If you experience any of the following symptoms chest pain, shortness of breath, fever greater than 100.5, nausea, vomiting, change in mental status, pain unrelieved by medication, please follow up with Prateek Julian NP    *  Please give a list of your current medications to your Primary Care Provider. *  Please update this list whenever your medications are discontinued, doses are      changed, or new medications (including over-the-counter products) are added. *  Please carry medication information at all times in case of emergency situations. These are general instructions for a healthy lifestyle:    No smoking/ No tobacco products/ Avoid exposure to second hand smoke  Surgeon General's Warning:  Quitting smoking now greatly reduces serious risk to your health.     Obesity, smoking, and sedentary lifestyle greatly increases your risk for illness    A healthy diet, regular physical exercise & weight monitoring are important for maintaining a healthy lifestyle    You may be retaining fluid if you have a history of heart failure or if you experience any of the following symptoms:  Weight gain of 3 pounds or more overnight or 5 pounds in a week, increased swelling in our hands or feet or shortness of breath while lying flat in bed. Please call your doctor as soon as you notice any of these symptoms; do not wait until your next office visit. Patient armband removed and shredded  MyChart Activation    Thank you for requesting access to [email or web address]. Please follow the instructions below to securely access and download your online medical record. [employer] allows you to send messages to your doctor, view your test results, renew your prescriptions, schedule appointments, and more. How Do I Sign Up? 1. In your internet browser, go to [email or web address]  2. Click on the First Time User? Click Here link in the Sign In box. You will be redirect to the New Member Sign Up page. 3. Enter your [employer] Access Code exactly as it appears below. You will not need to use this code after youve completed the sign-up process. If you do not sign up before the expiration date, you must request a new code. [email or web address] Access Code: [email or web address]  Expires: 2022  8:59 PM (This is the date your [employer] access code will )    4. Enter the last four digits of your Social Security Number (xxxx) and Date of Birth (mm/dd/yyyy) as indicated and click Submit. You will be taken to the next sign-up page. 5. Create a [employer] ID. This will be your [employer] login ID and cannot be changed, so think of one that is secure and easy to remember. 6. Create a [employer] password. You can change your password at any time. 7. Enter your Password Reset Question and Answer. This can be used at a later time if you forget your password. 8. Enter your e-mail address. You will receive e-mail notification when new information is available in [address] 9. Click Sign Up. You can now view and download portions of your medical record.   10. Click the Download Summary menu link to download a portable copy of your medical information. Additional Information    If you have questions, please visit the Frequently Asked Questions section of the [employer] website at [email or web address] Remember, [employer] is NOT to be used for urgent needs. For medical emergencies, dial 911. The discharge information has been reviewed with the {PATIENT PARENT GUARDIAN:93570}. The {PATIENT PARENT GUARDIAN:03700} verbalized understanding. Discharge medications reviewed with the {Dishcarge meds reviewed UJZV:45154} and appropriate educational materials and side effects teaching were provided.   ___________________________________________________________________________________________________________________________________

## 2022-05-12 NOTE — PROCEDURES
[address] [place] [address]   EEG    Name:  Reanna Ricks  MR#:   [id number]  :  1970  ACCOUNT #:  [de-identified]  DATE OF SERVICE:  2022    This is a portable EEG. HISTORY/REASON FOR REQUEST:  This is a 57-year-old male with history of end-stage renal disease. The patient admitted with mental status changes. EEG is requested to rule out epileptiform activity. CONDITION OF THE RECORDING:  This is a digital EEG performed using digital electrode placed according to the International System for Electrode Placement. DESCRIPTION OF THE RECORDING:  When the patient is maximally alert, the background activity consisted of about 7 Hz posterior-dominant rhythm which was reactive to eye opening and eye closure. Throughout this recording, there is mild-to-moderate slowing theta activity with small amount of delta. During this recording, the patient progresses to wakefulness and drowsiness, but does not attain stage II sleep. No clinical or electrographic seizures are noted during this recording. IMPRESSION:  This is an abnormal EEG due to the presence of mild diffuse slowing suggesting mild-to-moderate diffuse cortical neuronal dysfunction, could be seen secondary to toxic/hypoxic/ischemic, toxic/metabolic or postictal etiology, or medication effect. Further clinical correlation is suggested. There is also artifact mixed seen during this recording throughout this recording. Throughout this recording, there is diffuse delta and theta mixed activity seen.         Corinn Kocher, MD      [id number]  D:  2022 8:07  T:  2022 11:15  JOB #:  [id number]

## 2022-05-12 NOTE — PROGRESS NOTES
Discharge teaching completed at bedside with patient. Opportunity for clarifying questions. All answered to patient satisfaction  EKG leads removed IV removed. ID removed and shredded. Patient discharged via wheelchair.

## 2023-03-30 ENCOUNTER — OFFICE VISIT (OUTPATIENT)
Age: 53
End: 2023-03-30

## 2023-03-30 VITALS — TEMPERATURE: 97.8 F | HEIGHT: 71 IN | BODY MASS INDEX: 32.34 KG/M2 | WEIGHT: 231 LBS

## 2023-03-30 DIAGNOSIS — M70.21 OLECRANON BURSITIS OF RIGHT ELBOW: Primary | ICD-10-CM

## 2023-03-30 DIAGNOSIS — M25.521 RIGHT ELBOW PAIN: ICD-10-CM

## 2023-03-30 NOTE — PROGRESS NOTES
ORTHOPEDIC SURGERY      jaw surgery    ORTHOPEDIC SURGERY      left foot    VASCULAR SURGERY  06/2020    Left Arm AV Fistula    WISDOM TOOTH EXTRACTION        History reviewed. No pertinent family history.   Current Outpatient Medications   Medication Sig    bumetanide (BUMEX) 2 MG tablet ceived the following from [facility]: Outside name: bumetanide (BUMEX) 2 mg tablet    carvedilol (COREG) 6.25 MG tablet ceived the following from [facility] - [facility]: Outside name: carvediloL (COREG) 6.25 mg tablet    dicyclomine (BENTYL) 20 MG tablet TAKE 1 TABLET BY MOUTH THREE TIMES DAILY AS NEEDED FOR CONSTIPATION OR CRAMPING    docusate (COLACE, DULCOLAX) 100 MG CAPS ceived the following from [facility]: Outside name: docusate sodium (COLACE) 100 mg capsule    ezetimibe (ZETIA) 10 MG tablet ceived the following from [facility]: Outside name: ezetimibe (ZETIA) 10 mg tablet    hydrALAZINE (APRESOLINE) 25 MG tablet Take 25 mg by mouth 3 times daily    imipramine (TOFRANIL) 25 MG tablet Take 25 mg by mouth    insulin glargine (LANTUS) 100 UNIT/ML injection vial Inject 80 Units into the skin    isosorbide mononitrate (IMDUR) 30 MG extended release tablet ceived the following from [facility]: Outside name: isosorbide mononitrate ER (IMDUR) 30 mg tablet    lisinopril (PRINIVIL;ZESTRIL) 20 MG tablet Take 20 mg by mouth daily    NIFEdipine (ADALAT CC) 30 MG extended release tablet ceived the following from [facility]: Outside name: NIFEdipine ER (ADALAT CC) 30 mg ER tablet    omeprazole (PRILOSEC) 40 MG delayed release capsule ceived the following from [facility]: Outside name: omeprazole (PRILOSEC) 40 mg capsule    ondansetron (ZOFRAN-ODT) 4 MG disintegrating tablet ceived the following from [facility]: Outside name: ondansetron (ZOFRAN ODT) 4 mg disintegrating tablet    lipase-protease-amylase (CREON)

## 2023-07-18 RX ORDER — SUCROFERRIC OXYHYDROXIDE 500 MG/1
500 TABLET, CHEWABLE ORAL 3 TIMES DAILY
COMMUNITY
Start: 2022-10-14

## 2023-07-18 RX ORDER — LORAZEPAM 0.5 MG/1
TABLET ORAL
COMMUNITY
Start: 2021-08-05

## 2023-07-18 RX ORDER — TRAZODONE HYDROCHLORIDE 50 MG/1
50 TABLET ORAL NIGHTLY
COMMUNITY

## 2023-07-18 RX ORDER — METOCLOPRAMIDE 5 MG/1
5 TABLET ORAL 3 TIMES DAILY
COMMUNITY
Start: 2021-09-26

## 2023-07-18 RX ORDER — LACTULOSE 10 G/15ML
20 SOLUTION ORAL 3 TIMES DAILY
COMMUNITY
Start: 2022-05-30

## 2023-07-18 NOTE — PROGRESS NOTES
Instructions for your surgery at [address] [place] [address] Date:  7/18/2023      Patient's Name:  Martínez Murguia           Surgery Date:  08/01/2023              Please enter the main entrance of the hospital and check-in at the  located in the lobby. Once registered, you will take the elevators to the second floor, check in at the desk or call ext [phone] and proceed to the surgical waiting room. Someone will come escort you to the pre-op area. Do NOT eat or drink anything, including candy, gum, or ice chips after midnight prior to your surgery, unless you have specific instructions from your surgeon or anesthesia provider to do so. Brush your teeth before coming to the hospital. You may swish with water, but do not swallow. No smoking/Vaping/E-Cigarettes 24 hours prior to the day of surgery. No alcohol 24 hours prior to the day of surgery. No recreational drugs for one week prior to the day of surgery. Bring Photo ID, Insurance information, and Co-pay if required on day of surgery. Bring in pertinent legal documents, such as, Medical Power of WALDO HYUN, DNR, Advance Directive, etc.  Leave all valuables, including money/purse, at home. Remove all jewelry, including ALL body piercings, nail polish, acrylic nails, and makeup (including mascara); no lotions, powders, deodorant, or perfume/cologne/after shave on the skin. Follow instruction for Hibiclens washes and CHG wipes from surgeon's office. Glasses and dentures may be worn to the hospital. They must be removed prior to surgery. Please bring case/container for glasses or dentures. Contact lenses should not be worn on day of surgery. Call your doctor's office if symptoms of a cold or illness develop within 24-48 hours prior to your surgery. 14. AN ADULT (relative or friend 25 years or older) [address].   15. Please make arrangements for a responsible adult (18 years or older) to be with

## 2023-07-31 ENCOUNTER — ANESTHESIA EVENT (OUTPATIENT)
Facility: HOSPITAL | Age: 53
End: 2023-07-31
Payer: MEDICARE

## 2023-08-01 ENCOUNTER — ANESTHESIA (OUTPATIENT)
Facility: HOSPITAL | Age: 53
End: 2023-08-01
Payer: MEDICARE

## 2023-08-01 ENCOUNTER — HOSPITAL ENCOUNTER (OUTPATIENT)
Facility: HOSPITAL | Age: 53
Setting detail: OUTPATIENT SURGERY
Discharge: HOME | End: 2023-08-01
Attending: INTERNAL MEDICINE | Admitting: INTERNAL MEDICINE
Payer: MEDICARE

## 2023-08-01 VITALS
OXYGEN SATURATION: 93 % | SYSTOLIC BLOOD PRESSURE: 107 MMHG | BODY MASS INDEX: 34.13 KG/M2 | HEIGHT: 71 IN | RESPIRATION RATE: 18 BRPM | TEMPERATURE: 97.7 F | WEIGHT: 243.8 LBS | HEART RATE: 61 BPM | DIASTOLIC BLOOD PRESSURE: 50 MMHG

## 2023-08-01 LAB
ANION GAP SERPL CALC-SCNC: 6 MMOL/L (ref 3–18)
BUN SERPL-MCNC: 54 MG/DL (ref 7–18)
BUN/CREAT SERPL: 8 (ref 12–20)
CALCIUM SERPL-MCNC: 8.5 MG/DL (ref 8.5–10.1)
CHLORIDE SERPL-SCNC: 98 MMOL/L (ref 100–111)
CO2 SERPL-SCNC: 28 MMOL/L (ref 21–32)
CREAT SERPL-MCNC: 6.84 MG/DL (ref 0.6–1.3)
GLUCOSE BLD STRIP.AUTO-MCNC: 132 MG/DL (ref 70–110)
GLUCOSE BLD STRIP.AUTO-MCNC: 160 MG/DL (ref 70–110)
GLUCOSE SERPL-MCNC: 152 MG/DL (ref 74–99)
POTASSIUM SERPL-SCNC: 4 MMOL/L (ref 3.5–5.5)
SODIUM SERPL-SCNC: 132 MMOL/L (ref 136–145)

## 2023-08-01 PROCEDURE — 2500000003 HC RX 250 WO HCPCS: Performed by: NURSE ANESTHETIST, CERTIFIED REGISTERED

## 2023-08-01 PROCEDURE — 6360000002 HC RX W HCPCS: Performed by: NURSE ANESTHETIST, CERTIFIED REGISTERED

## 2023-08-01 PROCEDURE — 3700000000 HC ANESTHESIA ATTENDED CARE: Performed by: INTERNAL MEDICINE

## 2023-08-01 PROCEDURE — 3600007502: Performed by: INTERNAL MEDICINE

## 2023-08-01 PROCEDURE — 7100000010 HC PHASE II RECOVERY - FIRST 15 MIN: Performed by: INTERNAL MEDICINE

## 2023-08-01 PROCEDURE — 7100000000 HC PACU RECOVERY - FIRST 15 MIN: Performed by: INTERNAL MEDICINE

## 2023-08-01 PROCEDURE — 2709999900 HC NON-CHARGEABLE SUPPLY: Performed by: INTERNAL MEDICINE

## 2023-08-01 PROCEDURE — 3600007512: Performed by: INTERNAL MEDICINE

## 2023-08-01 PROCEDURE — 2580000003 HC RX 258: Performed by: NURSE ANESTHETIST, CERTIFIED REGISTERED

## 2023-08-01 PROCEDURE — 3700000001 HC ADD 15 MINUTES (ANESTHESIA): Performed by: INTERNAL MEDICINE

## 2023-08-01 PROCEDURE — 82962 GLUCOSE BLOOD TEST: CPT

## 2023-08-01 PROCEDURE — 80048 BASIC METABOLIC PNL TOTAL CA: CPT

## 2023-08-01 RX ORDER — LIDOCAINE HYDROCHLORIDE 20 MG/ML
INJECTION, SOLUTION EPIDURAL; INFILTRATION; INTRACAUDAL; PERINEURAL PRN
Status: DISCONTINUED | OUTPATIENT
Start: 2023-08-01 | End: 2023-08-01 | Stop reason: SDUPTHER

## 2023-08-01 RX ORDER — INSULIN LISPRO 100 [IU]/ML
0-12 INJECTION, SOLUTION INTRAVENOUS; SUBCUTANEOUS ONCE
Status: DISCONTINUED | OUTPATIENT
Start: 2023-08-01 | End: 2023-08-01 | Stop reason: HOSPADM

## 2023-08-01 RX ORDER — PROPOFOL 10 MG/ML
INJECTION, EMULSION INTRAVENOUS PRN
Status: DISCONTINUED | OUTPATIENT
Start: 2023-08-01 | End: 2023-08-01 | Stop reason: SDUPTHER

## 2023-08-01 RX ORDER — SODIUM CHLORIDE 9 MG/ML
INJECTION, SOLUTION INTRAVENOUS CONTINUOUS
Status: DISCONTINUED | OUTPATIENT
Start: 2023-08-01 | End: 2023-08-01

## 2023-08-01 RX ORDER — DEXTROSE MONOHYDRATE 100 MG/ML
INJECTION, SOLUTION INTRAVENOUS CONTINUOUS PRN
Status: DISCONTINUED | OUTPATIENT
Start: 2023-08-01 | End: 2023-08-01 | Stop reason: HOSPADM

## 2023-08-01 RX ORDER — ONDANSETRON 2 MG/ML
INJECTION INTRAMUSCULAR; INTRAVENOUS PRN
Status: DISCONTINUED | OUTPATIENT
Start: 2023-08-01 | End: 2023-08-01 | Stop reason: SDUPTHER

## 2023-08-01 RX ORDER — LIDOCAINE HYDROCHLORIDE 10 MG/ML
1 INJECTION, SOLUTION EPIDURAL; INFILTRATION; INTRACAUDAL; PERINEURAL
Status: DISCONTINUED | OUTPATIENT
Start: 2023-08-01 | End: 2023-08-01 | Stop reason: HOSPADM

## 2023-08-01 RX ADMIN — LIDOCAINE HYDROCHLORIDE 40 MG: 20 INJECTION, SOLUTION EPIDURAL; INFILTRATION; INTRACAUDAL; PERINEURAL at 10:56

## 2023-08-01 RX ADMIN — PROPOFOL 20 MG: 10 INJECTION, EMULSION INTRAVENOUS at 11:02

## 2023-08-01 RX ADMIN — PROPOFOL 100 MG: 10 INJECTION, EMULSION INTRAVENOUS at 10:56

## 2023-08-01 RX ADMIN — ONDANSETRON 4 MG: 2 INJECTION INTRAMUSCULAR; INTRAVENOUS at 10:54

## 2023-08-01 RX ADMIN — SODIUM CHLORIDE: 9 INJECTION, SOLUTION INTRAVENOUS at 09:34

## 2023-08-01 RX ADMIN — PROPOFOL 20 MG: 10 INJECTION, EMULSION INTRAVENOUS at 10:58

## 2023-08-01 ASSESSMENT — PAIN SCALES - GENERAL: PAINLEVEL_OUTOF10: 0

## 2023-08-01 ASSESSMENT — PAIN - FUNCTIONAL ASSESSMENT: PAIN_FUNCTIONAL_ASSESSMENT: 0-10

## 2023-08-01 NOTE — BRIEF OP NOTE
[address] [place] [address]      Brief Procedure Note    Mary Alice Garza  1970  [id number]    Date of Procedure: 8/1/2023     Preoperative diagnosis: Dysphagia, unspecified type [R13.10]  Gastroesophageal reflux disease, unspecified whether esophagitis present [K21.9]  Epigastric pain [R10.13]    Postoperative diagnosis: Dysphagia, unspecified type [R13.10]  Gastroesophageal reflux disease, unspecified whether esophagitis present [K21.9]  Epigastric pain [R10.13]    Type of Anesthesia: MAC (Monitored anesthesia care)    Description of findings: same as post op dx    Procedure: Procedure(s):  EGD ESOPHAGOGASTRODUODENOSCOPY    :  Dr. Michael Chou MD    Assistant(s): Circulator: Adam Dillon RN; Zelalem Lacey RN    Devices/implants/grafts/tissues/prosthesis: None    EBL:None    Specimens: * No specimens in log *    Findings: See printed and scanned procedure note    Complications: None    Dr. Michael Chou MD  8/1/2023  11:08 AM

## 2023-08-01 NOTE — ANESTHESIA PRE PROCEDURE
02:50 AM    GFRAA 16 05/12/2022 02:50 AM    AGRATIO 0.6 05/12/2022 02:50 AM    GLUCOSE 95 05/12/2022 02:50 AM    PROT 6.7 05/12/2022 02:50 AM    CALCIUM 8.9 05/12/2022 02:50 AM    BILITOT 1.7 05/12/2022 02:50 AM    ALKPHOS 156 05/12/2022 02:50 AM    AST 26 05/12/2022 02:50 AM    ALT 22 05/12/2022 02:50 AM       POC Tests: No results for input(s): POCGLU, POCNA, POCK, POCCL, POCBUN, POCHEMO, POCHCT in the last 72 hours. Coags:   Lab Results   Component Value Date/Time    PROTIME 13.4 07/07/2021 07:56 AM    INR 1.0 07/07/2021 07:56 AM       HCG (If Applicable): No results found for: PREGTESTUR, PREGSERUM, HCG, HCGQUANT     ABGs: No results found for: PHART, PO2ART, FAR9MPT, MEW0WPX, BEART, L3YKTSWY     Type & Screen (If Applicable):  No results found for: LABABO, LABRH    Drug/Infectious Status (If Applicable):  No results found for: HIV, HEPCAB    COVID-19 Screening (If Applicable):   Lab Results   Component Value Date/Time    COVID19 Not Detected 12/24/2020 08:57 AM           Anesthesia Evaluation     history of anesthetic complications: PONV. Airway: Mallampati: II  TM distance: >3 FB   Neck ROM: full  Mouth opening: > = 3 FB   Dental: normal exam         Pulmonary:Negative Pulmonary ROS and normal exam                               Cardiovascular:    (+) hypertension:, hyperlipidemia      ECG reviewed               Beta Blocker:  Dose within 24 Hrs         Neuro/Psych:   Negative Neuro/Psych ROS              GI/Hepatic/Renal:   (+) renal disease: dialysis and ESRD,           Endo/Other:    (+) DiabetesType II DM, , electrolyte abnormalities, . Abdominal: normal exam            Vascular: negative vascular ROS. Other Findings:           Anesthesia Plan      MAC     ASA 3             Anesthetic plan and risks discussed with patient and spouse. Plan discussed with CRNA.     Attending anesthesiologist reviewed and agrees with Lidya Saul MD

## 2023-08-01 NOTE — H&P
input(s): ALB, TP, AML in the last 72 hours. Invalid input(s): DBILI, TBILI, GPT, SGOT, AP, LPSE     Coags   No results for input(s): INR, APTT in the last 72 hours. Invalid input(s): BRET Menendez, [address] [place] [address]  [facility], [address]  [email or web address]. [email or web address]

## 2023-08-01 NOTE — ANESTHESIA POSTPROCEDURE EVALUATION
Department of Anesthesiology  Postprocedure Note    Patient: Ramona Weeks  MRN: [id number]  YOB: 1970  Date of evaluation: 8/1/2023      Procedure Summary     Date: 08/01/23 Room / Location: [facility] ENDO 03 / [facility] ENDOSCOPY    Anesthesia Start: 0686 Anesthesia Stop: 2529    Procedure: EGD ESOPHAGOGASTRODUODENOSCOPY (Upper GI Region) Diagnosis:       Dysphagia, unspecified type      Gastroesophageal reflux disease, unspecified whether esophagitis present      Epigastric pain      (Dysphagia, unspecified type [R13.10])      (Gastroesophageal reflux disease, unspecified whether esophagitis present [K21.9])      (Epigastric pain [R10.13])    Surgeons: Kylie Morgan MD Responsible Provider: Jersey Andrade MD    Anesthesia Type: MAC ASA Status: 3          Anesthesia Type: No value filed.     Mulugeta Phase I: Mulugeta Score: 9    Mulugeta Phase II: Mulugeta Score: 10      Anesthesia Post Evaluation    Patient location during evaluation: PACU  Patient participation: complete - patient participated  Level of consciousness: awake and alert  Pain score: 0  Airway patency: patent  Nausea & Vomiting: no vomiting and no nausea  Complications: no  Cardiovascular status: hemodynamically stable  Respiratory status: acceptable  Hydration status: stable  Pain management: adequate

## 2024-06-03 ENCOUNTER — HOSPITAL ENCOUNTER (OUTPATIENT)
Facility: HOSPITAL | Age: 54
Setting detail: INFUSION SERIES
End: 2024-06-03

## (undated) DEVICE — CATHETER SUCT TR FL TIP 14FR W/ O CTRL

## (undated) DEVICE — FLEX ADVANTAGE 3000CC: Brand: FLEX ADVANTAGE

## (undated) DEVICE — BITE BLOCK ENDOSCP UNIV AD 6 TO 9.4 MM

## (undated) DEVICE — STERILE POLYISOPRENE POWDER-FREE SURGICAL GLOVES: Brand: PROTEXIS

## (undated) DEVICE — CANNULA ORIG TL CLR W FOAM CUSHIONS AND 14FT SUPL TB 3 CHN

## (undated) DEVICE — FORCEPS BX L240CM JAW DIA2.4MM ORNG L CAP W/ NDL DISP RAD

## (undated) DEVICE — FCPS RAD JAW 4LC 240CM W/NDL -- BX/20 RADIAL JAW 4

## (undated) DEVICE — BASIN EMESIS 500CC ROSE 250/CS 60/PLT: Brand: MEDEGEN MEDICAL PRODUCTS, LLC

## (undated) DEVICE — GAUZE,SPONGE,4"X4",16PLY,STRL,LF,10/TRAY: Brand: MEDLINE

## (undated) DEVICE — SYR 50ML SLIP TIP NSAF LF STRL --

## (undated) DEVICE — AIRLIFE™ NASAL OXYGEN CANNULA CURVED, FLARED TIP WITH 14 FOOT (4.3 M) CRUSH-RESISTANT TUBING, OVER-THE-EAR STYLE: Brand: AIRLIFE™

## (undated) DEVICE — LINER SUCT CANSTR 3000CC PLAS SFT PRE ASSEMB W/OUT TBNG W/

## (undated) DEVICE — GOWN ISOL IMPERV UNIV, DISP, OPEN BACK, BLUE --

## (undated) DEVICE — BASIN EMSIS 16OZ GRAPHITE PLAS KID SHP MOLD GRAD FOR ORAL

## (undated) DEVICE — MEDI-VAC NON-CONDUCTIVE SUCTION TUBING: Brand: CARDINAL HEALTH

## (undated) DEVICE — SYRINGE MED 50ML LUERSLIP TIP

## (undated) DEVICE — ENDOSCOPY PUMP TUBING/ CAP SET: Brand: ERBE

## (undated) DEVICE — SYRINGE MED 25GA 3ML L5/8IN SUBQ PLAS W/ DETACH NDL SFTY

## (undated) DEVICE — SNARE POLYP M W27MMXL240CM OVL STIFF DISP CAPTIVATOR

## (undated) DEVICE — CANNULA NSL AD TBNG L14FT STD PVC O2 CRV CONN NONFLARED NSL

## (undated) DEVICE — SOLUTION IRRIG 1000ML H2O STRL BLT

## (undated) DEVICE — SYR 20ML LL STRL LF --

## (undated) DEVICE — FLUFF AND POLYMER UNDERPAD,EXTRA HEAVY: Brand: WINGS

## (undated) DEVICE — YANKAUER,SMOOTH HANDLE,HIGH CAPACITY: Brand: MEDLINE INDUSTRIES, INC.

## (undated) DEVICE — FORCEPS BX L240CM JAW DIA2.8MM L CAP W/ NDL MIC MESH TOOTH

## (undated) DEVICE — MEDI-VAC SUCTION HIGH CAPACITY: Brand: CARDINAL HEALTH

## (undated) DEVICE — SYR 10ML LUER LOK 1/5ML GRAD --

## (undated) DEVICE — UNDERPAD INCONT W23XL36IN STD BLU POLYPR BK FLUF SFT

## (undated) DEVICE — AIRLIFE™ NASAL OXYGEN CANNULA CURVED, NONFLARED TIP WITH 14 FOOT (4.3 M) CRUSH-RESISTANT TUBING, OVER-THE-EAR STYLE: Brand: AIRLIFE™